# Patient Record
Sex: FEMALE | Race: WHITE | NOT HISPANIC OR LATINO | Employment: FULL TIME | URBAN - METROPOLITAN AREA
[De-identification: names, ages, dates, MRNs, and addresses within clinical notes are randomized per-mention and may not be internally consistent; named-entity substitution may affect disease eponyms.]

---

## 2018-02-02 ENCOUNTER — TRANSFERRED RECORDS (OUTPATIENT)
Dept: HEALTH INFORMATION MANAGEMENT | Facility: CLINIC | Age: 63
End: 2018-02-02

## 2018-02-14 ENCOUNTER — TRANSFERRED RECORDS (OUTPATIENT)
Dept: HEALTH INFORMATION MANAGEMENT | Facility: CLINIC | Age: 63
End: 2018-02-14

## 2018-02-21 ENCOUNTER — TRANSFERRED RECORDS (OUTPATIENT)
Dept: HEALTH INFORMATION MANAGEMENT | Facility: CLINIC | Age: 63
End: 2018-02-21

## 2018-03-02 ENCOUNTER — TRANSFERRED RECORDS (OUTPATIENT)
Dept: HEALTH INFORMATION MANAGEMENT | Facility: CLINIC | Age: 63
End: 2018-03-02

## 2018-03-19 ENCOUNTER — TRANSFERRED RECORDS (OUTPATIENT)
Dept: HEALTH INFORMATION MANAGEMENT | Facility: CLINIC | Age: 63
End: 2018-03-19

## 2018-03-19 ENCOUNTER — MEDICAL CORRESPONDENCE (OUTPATIENT)
Dept: HEALTH INFORMATION MANAGEMENT | Facility: CLINIC | Age: 63
End: 2018-03-19

## 2018-04-16 ENCOUNTER — HEALTH MAINTENANCE LETTER (OUTPATIENT)
Age: 63
End: 2018-04-16

## 2018-04-17 ENCOUNTER — OFFICE VISIT (OUTPATIENT)
Dept: GASTROENTEROLOGY | Facility: CLINIC | Age: 63
End: 2018-04-17
Attending: INTERNAL MEDICINE
Payer: COMMERCIAL

## 2018-04-17 VITALS
RESPIRATION RATE: 16 BRPM | OXYGEN SATURATION: 100 % | BODY MASS INDEX: 32.78 KG/M2 | SYSTOLIC BLOOD PRESSURE: 125 MMHG | TEMPERATURE: 98.4 F | DIASTOLIC BLOOD PRESSURE: 76 MMHG | WEIGHT: 192 LBS | HEIGHT: 64 IN | HEART RATE: 68 BPM

## 2018-04-17 DIAGNOSIS — R94.5 ABNORMAL RESULTS OF LIVER FUNCTION STUDIES: Primary | ICD-10-CM

## 2018-04-17 DIAGNOSIS — R94.5 ABNORMAL RESULTS OF LIVER FUNCTION STUDIES: ICD-10-CM

## 2018-04-17 LAB
ALBUMIN SERPL-MCNC: 3.4 G/DL (ref 3.4–5)
ALP SERPL-CCNC: 115 U/L (ref 40–150)
ALT SERPL W P-5'-P-CCNC: 45 U/L (ref 0–50)
ANION GAP SERPL CALCULATED.3IONS-SCNC: 6 MMOL/L (ref 3–14)
AST SERPL W P-5'-P-CCNC: 44 U/L (ref 0–45)
BILIRUB DIRECT SERPL-MCNC: 0.1 MG/DL (ref 0–0.2)
BILIRUB SERPL-MCNC: 0.4 MG/DL (ref 0.2–1.3)
BUN SERPL-MCNC: 11 MG/DL (ref 7–30)
CALCIUM SERPL-MCNC: 7.9 MG/DL (ref 8.5–10.1)
CHLORIDE SERPL-SCNC: 109 MMOL/L (ref 94–109)
CO2 SERPL-SCNC: 25 MMOL/L (ref 20–32)
CREAT SERPL-MCNC: 0.58 MG/DL (ref 0.52–1.04)
ERYTHROCYTE [DISTWIDTH] IN BLOOD BY AUTOMATED COUNT: 16.8 % (ref 10–15)
FERRITIN SERPL-MCNC: 41 NG/ML (ref 8–252)
GFR SERPL CREATININE-BSD FRML MDRD: >90 ML/MIN/1.7M2
GLUCOSE SERPL-MCNC: 98 MG/DL (ref 70–99)
HAV IGG SER QL IA: REACTIVE
HCT VFR BLD AUTO: 40.5 % (ref 35–47)
HCV AB SERPL QL IA: NONREACTIVE
HGB BLD-MCNC: 13.1 G/DL (ref 11.7–15.7)
IGG SERPL-MCNC: 1360 MG/DL (ref 695–1620)
IGM SERPL-MCNC: 68 MG/DL (ref 60–265)
INR PPP: 1.1 (ref 0.86–1.14)
IRON SATN MFR SERPL: 8 % (ref 15–46)
IRON SERPL-MCNC: 31 UG/DL (ref 35–180)
MCH RBC QN AUTO: 27.3 PG (ref 26.5–33)
MCHC RBC AUTO-ENTMCNC: 32.3 G/DL (ref 31.5–36.5)
MCV RBC AUTO: 84 FL (ref 78–100)
PLATELET # BLD AUTO: 153 10E9/L (ref 150–450)
POTASSIUM SERPL-SCNC: 3.4 MMOL/L (ref 3.4–5.3)
PROT SERPL-MCNC: 7.5 G/DL (ref 6.8–8.8)
RBC # BLD AUTO: 4.8 10E12/L (ref 3.8–5.2)
SODIUM SERPL-SCNC: 141 MMOL/L (ref 133–144)
TIBC SERPL-MCNC: 387 UG/DL (ref 240–430)
WBC # BLD AUTO: 5.4 10E9/L (ref 4–11)

## 2018-04-17 PROCEDURE — 83540 ASSAY OF IRON: CPT | Performed by: INTERNAL MEDICINE

## 2018-04-17 PROCEDURE — 36415 COLL VENOUS BLD VENIPUNCTURE: CPT | Performed by: INTERNAL MEDICINE

## 2018-04-17 PROCEDURE — G0463 HOSPITAL OUTPT CLINIC VISIT: HCPCS | Mod: ZF

## 2018-04-17 PROCEDURE — 86256 FLUORESCENT ANTIBODY TITER: CPT | Performed by: INTERNAL MEDICINE

## 2018-04-17 PROCEDURE — 85610 PROTHROMBIN TIME: CPT | Performed by: INTERNAL MEDICINE

## 2018-04-17 PROCEDURE — 80048 BASIC METABOLIC PNL TOTAL CA: CPT | Performed by: INTERNAL MEDICINE

## 2018-04-17 PROCEDURE — 86038 ANTINUCLEAR ANTIBODIES: CPT | Performed by: INTERNAL MEDICINE

## 2018-04-17 PROCEDURE — 86708 HEPATITIS A ANTIBODY: CPT | Performed by: INTERNAL MEDICINE

## 2018-04-17 PROCEDURE — 86039 ANTINUCLEAR ANTIBODIES (ANA): CPT | Performed by: INTERNAL MEDICINE

## 2018-04-17 PROCEDURE — 83516 IMMUNOASSAY NONANTIBODY: CPT | Mod: 91 | Performed by: INTERNAL MEDICINE

## 2018-04-17 PROCEDURE — 86803 HEPATITIS C AB TEST: CPT | Performed by: INTERNAL MEDICINE

## 2018-04-17 PROCEDURE — 82784 ASSAY IGA/IGD/IGG/IGM EACH: CPT | Performed by: INTERNAL MEDICINE

## 2018-04-17 PROCEDURE — 80076 HEPATIC FUNCTION PANEL: CPT | Performed by: INTERNAL MEDICINE

## 2018-04-17 PROCEDURE — 82728 ASSAY OF FERRITIN: CPT | Performed by: INTERNAL MEDICINE

## 2018-04-17 PROCEDURE — 83516 IMMUNOASSAY NONANTIBODY: CPT | Performed by: INTERNAL MEDICINE

## 2018-04-17 PROCEDURE — 83550 IRON BINDING TEST: CPT | Performed by: INTERNAL MEDICINE

## 2018-04-17 PROCEDURE — 85027 COMPLETE CBC AUTOMATED: CPT | Performed by: INTERNAL MEDICINE

## 2018-04-17 RX ORDER — LABETALOL 200 MG/1
200 TABLET, FILM COATED ORAL 2 TIMES DAILY
COMMUNITY
Start: 2017-07-03 | End: 2021-06-16

## 2018-04-17 RX ORDER — CALCITRIOL 0.25 UG/1
0.5 CAPSULE, LIQUID FILLED ORAL 2 TIMES DAILY
COMMUNITY
Start: 2016-08-15

## 2018-04-17 RX ORDER — PNV NO.95/FERROUS FUM/FOLIC AC 28MG-0.8MG
1 TABLET ORAL DAILY
COMMUNITY
Start: 2018-02-10

## 2018-04-17 RX ORDER — LISINOPRIL 20 MG/1
20 TABLET ORAL DAILY
COMMUNITY
Start: 2001-05-01 | End: 2024-09-11

## 2018-04-17 RX ORDER — ALBUTEROL SULFATE 90 UG/1
2 AEROSOL, METERED RESPIRATORY (INHALATION) EVERY 4 HOURS PRN
COMMUNITY

## 2018-04-17 RX ORDER — MONTELUKAST SODIUM 10 MG/1
10 TABLET ORAL DAILY
COMMUNITY
Start: 1995-05-01

## 2018-04-17 RX ORDER — ESCITALOPRAM OXALATE 20 MG/1
20 TABLET ORAL DAILY
COMMUNITY
Start: 2018-03-02 | End: 2024-03-13 | Stop reason: ALTCHOICE

## 2018-04-17 RX ORDER — SIMVASTATIN 20 MG
20 TABLET ORAL DAILY
COMMUNITY
End: 2020-10-07 | Stop reason: ALTCHOICE

## 2018-04-17 RX ORDER — LEVOTHYROXINE SODIUM 200 UG/1
250 TABLET ORAL DAILY
COMMUNITY
Start: 1996-01-13

## 2018-04-17 ASSESSMENT — PAIN SCALES - GENERAL: PAINLEVEL: NO PAIN (0)

## 2018-04-17 NOTE — PROGRESS NOTES
[facility]    Hepatology New Patient Visit    Referring provider:  Jonathon Barrios      62 year old female    Chief complaint:  Abnormal liver function tests    HPI:  The patient comes to clinic this morning with her  for further evaluation and management of abnormal liver function tests.  This was first identified in 01/2018 after blood work was obtained to evaluate fatigue.  Of note, hemoglobin was low with mild elevations in transaminase and an elevated alkaline phosphatase also noted.  The patient had recently been sick from influenza in 12/2017.  The patient has had an extensive evaluation including additional blood work, MRI liver/MRCP and liver biopsy.  Liver biopsy showed neutrophil-predominant inflammation and evidence of possible biliary obstruction in a patchy distribution.  There was some evidence of fibrosis (stage 2).   The patient also underwent upper endoscopy which was essentially normal.     The patient is feeling well today.  Her fatigue has improved with starting iron supplements.  She notes some mild itching over the last 2 months.  This is worse at night-time and occurs predominantly on her trunk.  She denies any exacerbating or relieving factors.      The patient reports some mild joint aches bilaterally in both hands.  This has been going on for the past couple of months.  She denies any joint erythema or swelling.      The patient denies rash, jaundice, abdominal distention, lower extremity edema, or confusion.      The patient denies any history of weight loss, dark urine or pale stools.      No history of melena, hematemesis or hematochezia.      The patient reports some mild sweats at night.  She denies any fevers or chills.      Weight has been stable.      The patient reports Augmentin use following her URI/influenza in 12/2017.  She denies any new regular prescription medications since that time.  She also denies any herbal medication use or supplement  use.      The patient drinks alcohol once or twice per month.  On these occasions, she will have 1-2 margaritas.  She denies any history of alcohol abuse.  She denies any health or with work related issues from alcohol use.  No history of DUIs.      The patient has never smoked.  She denies any recreational drug use including marijuana, IM or IV drugs.       Medical hx Surgical hx   Past Medical History:   Diagnosis Date     Asthma      Depression with anxiety      GERD (gastroesophageal reflux disease)      Hyperlipidemia      Hypertension      Obesity      Thyroid cancer (H) 1996      Past Surgical History:   Procedure Laterality Date     C TOTAL ABDOM HYSTERECTOMY       CHOLECYSTECTOMY       SALPINGO-OOPHORECTOMY BILATERAL       SHOULDER SURGERY       THYROIDECTOMY  1996          Medications  Prior to Admission medications    Medication Sig Start Date End Date Taking? Authorizing Provider   calcitRIOL (ROCALTROL) 0.25 MCG capsule Take 0.25 mcg by mouth daily 8/15/16  Yes Reported, Patient   calcium carbonate-vitamin D (CALCIUM 600/VITAMIN D) 600-400 MG-UNIT CHEW Take 1 chew tab by mouth 4 times daily 1/13/1996  Yes Reported, Patient   escitalopram (LEXAPRO) 20 MG tablet Take 20 mg by mouth daily 3/2/18  Yes Reported, Patient   Ferrous Sulfate (IRON) 325 (65 Fe) MG tablet Take 1 tablet by mouth daily 2/10/18  Yes Reported, Patient   labetalol (NORMODYNE) 200 MG tablet Take 200 mg by mouth daily 7/3/17  Yes Reported, Patient   levothyroxine (SYNTHROID/LEVOTHROID) 200 MCG tablet Take 200 mcg by mouth daily 1/13/1996  Yes Reported, Patient   lisinopril (PRINIVIL/ZESTRIL) 20 MG tablet Take 20 mg by mouth daily 5/1/01  Yes Reported, Patient   montelukast (SINGULAIR) 10 MG tablet Take 10 mg by mouth daily 5/1/1995  Yes Reported, Patient   simvastatin (ZOCOR) 20 MG tablet Take 20 mg by mouth daily   Yes Reported, Patient   albuterol (PROAIR HFA/PROVENTIL HFA/VENTOLIN HFA) 108 (90 Base) MCG/ACT Inhaler Inhale 2 puffs into  "the lungs every 6 hours as needed for shortness of breath / dyspnea or wheezing   Yes Reported, Patient       Allergies  No Known Allergies    Family hx Social hx   Family History   Problem Relation Age of Onset     Breast Cancer Mother      Coronary Artery Disease Father      Colon Cancer Brother 50     Liver Disease No family hx of      Rheumatoid Arthritis No family hx of       Social History   Substance Use Topics     Smoking status: Never Smoker     Smokeless tobacco: Never Used     Alcohol use Yes      Comment: occasionally     Lives in [place], [place] with  on farm.  Works as  for [employer].  2 healthy adult children.     Review of systems  A 10-point review of systems was negative.    Examination  /76 (BP Location: Right arm, Patient Position: Sitting, Cuff Size: Adult Large)  Pulse 68  Temp 98.4  F (36.9  C) (Oral)  Resp 16  Ht 1.626 m (5' 4\")  Wt 87.1 kg (192 lb)  SpO2 100%  BMI 32.96 kg/m2  Body mass index is 32.96 kg/(m^2).    Gen- well, NAD, A+Ox3, normal color  Eye- EOMI  ENT- MMM, normal oropharynx, thyroidectomy scar  Lym- no palpable lymphadenopathy  CVS- S1, S2 normal, no added sounds, RRR  RS- CTA  Abd- obese, striae, scars consistent with prior surgeries, soft, non-tender, no   Extr- pulses good, no ALEX  MS- hands normal- no clubbing, palmar erythema or dupuytren's contracture, hand joints normal- no erythema, swelling or deformities  Neuro- A+Ox3, no asterixis  Skin- no rash or jaundice  Psych- normal mood    Laboratory  2/19/18  TB 0.7, ALT 80, AST 65, AlkPh 296    HBV SAg, HBV SAb, HBV CAb all neg    REECE 1.9  AMA<1.2  TTG Ab neg  Ferritin 26    2/9/18  Na 142, K 4.0, Cl 105, HCO3 26, BUN 13, Cr 0.65    Liver bx March 2018 - patchy portal inflammatory infiltrate predominantly neutrophils, ductular proliferation, minimal macrovesicular fatty change, enlarged portal tracts, portal fibrosis    Radiology  MRI liver 3/19/18- no ductal dilation, no mass, simple hepatic " cyst    Assessment  62-year-old female who presents for further evaluation and management of abnormal liver function tests and nonspecific abnormalities on liver biopsy.  History of itch and elevated serum alkaline phosphatase raised the suspicion for primary biliary cholangitis.  Drug-induced liver-injury is also possible with history of Augmentin use in December.  No evidence of cirrhosis on liver biopsy.  Will repeat serologies and liver function tests today to evaluate for PBC as this would require long-term management.  Will monitor for now with further interventions/management to be decided pending development of new symptoms/worsening abnormal liver function tests.     Plan  1.  Check CMP, INR, CBC  2.  Check REECE, AMA, IgG, IgM, iron studies  3.  Follow-up in 3 months    Tang Maddox MD  Hepatology  [facility]

## 2018-04-17 NOTE — LETTER
4/17/2018      RE: Geri Zamorano  [address]  [facility] [address]       [facility]    Hepatology New Patient Visit    Referring provider:  Jonathon Barrios      62 year old female    Chief complaint:  Abnormal liver function tests    HPI:  The patient comes to clinic this morning with her  for further evaluation and management of abnormal liver function tests.  This was first identified in 01/2018 after blood work was obtained to evaluate fatigue.  Of note, hemoglobin was low with mild elevations in transaminase and an elevated alkaline phosphatase also noted.  The patient had recently been sick from influenza in 12/2017.  The patient has had an extensive evaluation including additional blood work, MRI liver/MRCP and liver biopsy.  Liver biopsy showed neutrophil-predominant inflammation and evidence of possible biliary obstruction in a patchy distribution.  There was some evidence of fibrosis (stage 2).   The patient also underwent upper endoscopy which was essentially normal.     The patient is feeling well today.  Her fatigue has improved with starting iron supplements.  She notes some mild itching over the last 2 months.  This is worse at night-time and occurs predominantly on her trunk.  She denies any exacerbating or relieving factors.      The patient reports some mild joint aches bilaterally in both hands.  This has been going on for the past couple of months.  She denies any joint erythema or swelling.      The patient denies rash, jaundice, abdominal distention, lower extremity edema, or confusion.      The patient denies any history of weight loss, dark urine or pale stools.      No history of melena, hematemesis or hematochezia.      The patient reports some mild sweats at night.  She denies any fevers or chills.      Weight has been stable.      The patient reports Augmentin use following her URI/influenza in 12/2017.  She denies any new regular prescription  medications since that time.  She also denies any herbal medication use or supplement use.      The patient drinks alcohol once or twice per month.  On these occasions, she will have 1-2 margaritas.  She denies any history of alcohol abuse.  She denies any health or with work related issues from alcohol use.  No history of DUIs.      The patient has never smoked.  She denies any recreational drug use including marijuana, IM or IV drugs.       Medical hx Surgical hx   Past Medical History:   Diagnosis Date     Asthma      Depression with anxiety      GERD (gastroesophageal reflux disease)      Hyperlipidemia      Hypertension      Obesity      Thyroid cancer (H) 1996      Past Surgical History:   Procedure Laterality Date     C TOTAL ABDOM HYSTERECTOMY       CHOLECYSTECTOMY       SALPINGO-OOPHORECTOMY BILATERAL       SHOULDER SURGERY       THYROIDECTOMY  1996          Medications  Prior to Admission medications    Medication Sig Start Date End Date Taking? Authorizing Provider   calcitRIOL (ROCALTROL) 0.25 MCG capsule Take 0.25 mcg by mouth daily 8/15/16  Yes Reported, Patient   calcium carbonate-vitamin D (CALCIUM 600/VITAMIN D) 600-400 MG-UNIT CHEW Take 1 chew tab by mouth 4 times daily 1/13/1996  Yes Reported, Patient   escitalopram (LEXAPRO) 20 MG tablet Take 20 mg by mouth daily 3/2/18  Yes Reported, Patient   Ferrous Sulfate (IRON) 325 (65 Fe) MG tablet Take 1 tablet by mouth daily 2/10/18  Yes Reported, Patient   labetalol (NORMODYNE) 200 MG tablet Take 200 mg by mouth daily 7/3/17  Yes Reported, Patient   levothyroxine (SYNTHROID/LEVOTHROID) 200 MCG tablet Take 200 mcg by mouth daily 1/13/1996  Yes Reported, Patient   lisinopril (PRINIVIL/ZESTRIL) 20 MG tablet Take 20 mg by mouth daily 5/1/01  Yes Reported, Patient   montelukast (SINGULAIR) 10 MG tablet Take 10 mg by mouth daily 5/1/1995  Yes Reported, Patient   simvastatin (ZOCOR) 20 MG tablet Take 20 mg by mouth daily   Yes Reported, Patient   albuterol  "(PROAIR HFA/PROVENTIL HFA/VENTOLIN HFA) 108 (90 Base) MCG/ACT Inhaler Inhale 2 puffs into the lungs every 6 hours as needed for shortness of breath / dyspnea or wheezing   Yes Reported, Patient       Allergies  No Known Allergies    Family hx Social hx   Family History   Problem Relation Age of Onset     Breast Cancer Mother      Coronary Artery Disease Father      Colon Cancer Brother 50     Liver Disease No family hx of      Rheumatoid Arthritis No family hx of       Social History   Substance Use Topics     Smoking status: Never Smoker     Smokeless tobacco: Never Used     Alcohol use Yes      Comment: occasionally     Lives in [place], [place] with  on farm.  Works as  for [employer].  2 healthy adult children.     Review of systems  A 10-point review of systems was negative.    Examination  /76 (BP Location: Right arm, Patient Position: Sitting, Cuff Size: Adult Large)  Pulse 68  Temp 98.4  F (36.9  C) (Oral)  Resp 16  Ht 1.626 m (5' 4\")  Wt 87.1 kg (192 lb)  SpO2 100%  BMI 32.96 kg/m2  Body mass index is 32.96 kg/(m^2).    Gen- well, NAD, A+Ox3, normal color  Eye- EOMI  ENT- MMM, normal oropharynx, thyroidectomy scar  Lym- no palpable lymphadenopathy  CVS- S1, S2 normal, no added sounds, RRR  RS- CTA  Abd- obese, striae, scars consistent with prior surgeries, soft, non-tender, no   Extr- pulses good, no ALEX  MS- hands normal- no clubbing, palmar erythema or dupuytren's contracture, hand joints normal- no erythema, swelling or deformities  Neuro- A+Ox3, no asterixis  Skin- no rash or jaundice  Psych- normal mood    Laboratory  2/19/18  TB 0.7, ALT 80, AST 65, AlkPh 296    HBV SAg, HBV SAb, HBV CAb all neg    REECE 1.9  AMA<1.2  TTG Ab neg  Ferritin 26    2/9/18  Na 142, K 4.0, Cl 105, HCO3 26, BUN 13, Cr 0.65    Liver bx March 2018 - patchy portal inflammatory infiltrate predominantly neutrophils, ductular proliferation, minimal macrovesicular fatty change, enlarged portal tracts, portal " fibrosis    Radiology  MRI liver 3/19/18- no ductal dilation, no mass, simple hepatic cyst    Assessment  62-year-old female who presents for further evaluation and management of abnormal liver function tests and nonspecific abnormalities on liver biopsy.  History of itch and elevated serum alkaline phosphatase raised the suspicion for primary biliary cholangitis.  Drug-induced liver-injury is also possible with history of Augmentin use in December.  No evidence of cirrhosis on liver biopsy.  Will repeat serologies and liver function tests today to evaluate for PBC as this would require long-term management.  Will monitor for now with further interventions/management to be decided pending development of new symptoms/worsening abnormal liver function tests.     Plan  1.  Check CMP, INR, CBC  2.  Check REECE, AMA, IgG, IgM, iron studies  3.  Follow-up in 3 months    Tang Maddox MD  Hepatology  [facility]

## 2018-04-17 NOTE — MR AVS SNAPSHOT
After Visit Summary   4/17/2018    Geri Zamorano    MRN: [id number]           Patient Information     Date Of Birth          1955        Visit Information        Provider Department      4/17/2018 8:00 AM Tang Holloway MD [facility] Hepatology        Today's Diagnoses     Abnormal results of liver function studies    -  1       Follow-ups after your visit        Follow-up notes from your care team     Return in about 3 months (around 7/17/2018).      Your next 10 appointments already scheduled     Apr 17, 2018  9:30 AM CDT   Lab with  LAB   [facility] ([facility])    [address]  [address] [facility] [address]   [phone]            Jul 31, 2018  8:00 AM CDT   Lab with  LAB   [facility] ([facility])    [address] [place] [address]   [phone]            Jul 31, 2018  9:00 AM CDT   (Arrive by 8:45 AM)   Return General Liver with Tang Peterson MD   [facility] Hepatology ([facility])    [address] [place] [address]  [address]  [facility] [address]   [phone]              Future tests that were ordered for you today     Open Future Orders        Priority Expected Expires Ordered    INR Routine  5/17/2018 4/17/2018    IgM Routine  5/17/2018 4/17/2018    IgG Routine  5/17/2018 4/17/2018    Mitochondrial M2 Antibody IgG Routine  5/17/2018 4/17/2018    IRON Routine  5/17/2018 4/17/2018    IRON AND IRON BINDING CAPACITY Routine  5/17/2018 4/17/2018    FERRITIN Routine  5/17/2018 4/17/2018    CBC with platelets Routine  5/17/2018 4/17/2018    Basic metabolic panel Routine  5/17/2018 4/17/2018    Hepatic panel Routine  5/17/2018 4/17/2018    Hepatitis C antibody Routine  5/17/2018 4/17/2018    Hepatitis Antibody A IgG Routine  5/17/2018 4/17/2018            Who to contact     If you have questions or need follow up information about today's clinic visit or  "your schedule please contact [facility] HEPATOLOG[facility] directly at [phone].  Normal or non-critical lab and imaging results will be communicated to you by MyChart, letter or phone within 4 business days after the clinic has received the results. If you do not hear from us within 7 days, please contact the clinic through [employer] or phone. If you have a critical or abnormal lab result, we will notify you by phone as soon as possible.  Submit refill requests through [employer] or call your pharmacy and they will forward the refill request to us. Please allow 3 business days for your refill to be completed.          Additional Information About Your Visit        [employer] Information     [employer] gives you secure access to your electronic health record. If you see a primary care provider, you can also send messages to your care team and make appointments. If you have questions, please call your primary care clinic.  If you do not have a primary care provider, please call [phone] and they will assist you.        Care EveryWhere ID     This is your Care EveryWhere ID. This could be used by other organizations to access your [place] medical records  [id number]        Your Vitals Were     Pulse Temperature Respirations Height Pulse Oximetry BMI (Body Mass Index)    68 98.4  F (36.9  C) (Oral) 16 1.626 m (5' 4\") 100% 32.96 kg/m2       Blood Pressure from Last 3 Encounters:   04/17/18 125/76    Weight from Last 3 Encounters:   04/17/18 87.1 kg (192 lb)              We Performed the Following     Anti Nuclear Natasha IgG by IFA with Reflex     F Actin EIA with reflex        Primary Care Provider Office Phone # Fax #    Maira MURPHY Leon [phone]       [address]  [facility] [address]        Equal Access to Services     VIRI MITCHELL : Hadii ronnell Felipe, waaxda luqadaha, qaybta kaalmada melissa, sean hill. So [facility] [phone].    ATENCIÓN: Si " humble russell, tiene a huang disposición servicios gratuitos de asistencia lingüística. Jesusita garcia [phone].    We comply with applicable federal civil rights laws and [place] laws. We do not discriminate on the basis of race, color, national origin, age, disability, sex, sexual orientation, or gender identity.            Thank you!     Thank you for choosing [facility] HEPATOLOG[facility]  for your care. Our goal is always to provide you with excellent care. Hearing back from our patients is one way we can continue to improve our services. Please take a few minutes to complete the written survey that you may receive in the mail after your visit with us. Thank you!             Your Updated Medication List - Protect others around you: Learn how to safely use, store and throw away your medicines at www.disposemymeds.org.          This list is accurate as of 4/17/18  8:57 AM.  Always use your most recent med list.                   Brand Name Dispense Instructions for use Diagnosis    albuterol 108 (90 Base) MCG/ACT Inhaler    PROAIR HFA/PROVENTIL HFA/VENTOLIN HFA     Inhale 2 puffs into the lungs every 6 hours as needed for shortness of breath / dyspnea or wheezing    Abnormal results of liver function studies       calcitRIOL 0.25 MCG capsule    ROCALTROL     Take 0.25 mcg by mouth daily    Abnormal results of liver function studies       CALCIUM 600/VITAMIN D 600-400 MG-UNIT Chew   Generic drug:  calcium carbonate-vitamin D      Take 1 chew tab by mouth 4 times daily    Abnormal results of liver function studies       escitalopram 20 MG tablet    LEXAPRO     Take 20 mg by mouth daily    Abnormal results of liver function studies       iron 325 (65 Fe) MG tablet      Take 1 tablet by mouth daily    Abnormal results of liver function studies       labetalol 200 MG tablet    NORMODYNE     Take 200 mg by mouth daily    Abnormal results of liver function studies       levothyroxine 200 MCG tablet    SYNTHROID/LEVOTHROID     Take  200 mcg by mouth daily    Abnormal results of liver function studies       lisinopril 20 MG tablet    PRINIVIL/ZESTRIL     Take 20 mg by mouth daily    Abnormal results of liver function studies       montelukast 10 MG tablet    SINGULAIR     Take 10 mg by mouth daily    Abnormal results of liver function studies       simvastatin 20 MG tablet    ZOCOR     Take 20 mg by mouth daily    Abnormal results of liver function studies

## 2018-04-17 NOTE — NURSING NOTE
"Chief Complaint   Patient presents with     Consult     Elevated LFT's       Initial /76 (BP Location: Right arm, Patient Position: Sitting, Cuff Size: Adult Large)  Pulse 68  Temp 98.4  F (36.9  C) (Oral)  Resp 16  Ht 1.626 m (5' 4\")  Wt 87.1 kg (192 lb)  SpO2 100%  BMI 32.96 kg/m2 Estimated body mass index is 32.96 kg/(m^2) as calculated from the following:    Height as of this encounter: 1.626 m (5' 4\").    Weight as of this encounter: 87.1 kg (192 lb).  Medication Reconciliation: complete     Fadumo Lechuga [facility]  4/17/2018 7:54 AM        "

## 2018-04-19 LAB
ANA PAT SER IF-IMP: ABNORMAL
ANA PAT SER IF-IMP: ABNORMAL
ANA SER QL IF: POSITIVE
ANA TITR SER IF: ABNORMAL {TITER}
ANA TITR SER IF: ABNORMAL {TITER}
MITOCHONDRIA M2 IGG SER-ACNC: 1 U/ML
SMA IGG SER-ACNC: 42 UNITS (ref 0–19)
SMOOTH MUSCLE ABY IGG TITER: NORMAL

## 2018-06-26 DIAGNOSIS — R94.5 ABNORMAL RESULTS OF LIVER FUNCTION STUDIES: Primary | ICD-10-CM

## 2018-07-31 ENCOUNTER — OFFICE VISIT (OUTPATIENT)
Dept: GASTROENTEROLOGY | Facility: CLINIC | Age: 63
End: 2018-07-31
Attending: INTERNAL MEDICINE
Payer: COMMERCIAL

## 2018-07-31 VITALS
HEART RATE: 67 BPM | OXYGEN SATURATION: 98 % | DIASTOLIC BLOOD PRESSURE: 78 MMHG | WEIGHT: 200.3 LBS | BODY MASS INDEX: 34.38 KG/M2 | TEMPERATURE: 98.6 F | SYSTOLIC BLOOD PRESSURE: 153 MMHG

## 2018-07-31 DIAGNOSIS — E61.1 IRON DEFICIENCY: ICD-10-CM

## 2018-07-31 DIAGNOSIS — R94.5 ABNORMAL RESULTS OF LIVER FUNCTION STUDIES: Primary | ICD-10-CM

## 2018-07-31 DIAGNOSIS — R94.5 ABNORMAL RESULTS OF LIVER FUNCTION STUDIES: ICD-10-CM

## 2018-07-31 LAB
ALBUMIN SERPL-MCNC: 3.2 G/DL (ref 3.4–5)
ALP SERPL-CCNC: 86 U/L (ref 40–150)
ALT SERPL W P-5'-P-CCNC: 36 U/L (ref 0–50)
ANION GAP SERPL CALCULATED.3IONS-SCNC: 9 MMOL/L (ref 3–14)
AST SERPL W P-5'-P-CCNC: 39 U/L (ref 0–45)
BILIRUB DIRECT SERPL-MCNC: 0.2 MG/DL (ref 0–0.2)
BILIRUB SERPL-MCNC: 0.6 MG/DL (ref 0.2–1.3)
BUN SERPL-MCNC: 12 MG/DL (ref 7–30)
CALCIUM SERPL-MCNC: 7.9 MG/DL (ref 8.5–10.1)
CHLORIDE SERPL-SCNC: 110 MMOL/L (ref 94–109)
CO2 SERPL-SCNC: 21 MMOL/L (ref 20–32)
CREAT SERPL-MCNC: 0.64 MG/DL (ref 0.52–1.04)
ERYTHROCYTE [DISTWIDTH] IN BLOOD BY AUTOMATED COUNT: 14.1 % (ref 10–15)
GFR SERPL CREATININE-BSD FRML MDRD: >90 ML/MIN/1.7M2
GLUCOSE SERPL-MCNC: 153 MG/DL (ref 70–99)
HCT VFR BLD AUTO: 42.2 % (ref 35–47)
HGB BLD-MCNC: 13.6 G/DL (ref 11.7–15.7)
INR PPP: 1.16 (ref 0.86–1.14)
MCH RBC QN AUTO: 28.6 PG (ref 26.5–33)
MCHC RBC AUTO-ENTMCNC: 32.2 G/DL (ref 31.5–36.5)
MCV RBC AUTO: 89 FL (ref 78–100)
PLATELET # BLD AUTO: 128 10E9/L (ref 150–450)
POTASSIUM SERPL-SCNC: 3.6 MMOL/L (ref 3.4–5.3)
PROT SERPL-MCNC: 7.3 G/DL (ref 6.8–8.8)
RBC # BLD AUTO: 4.76 10E12/L (ref 3.8–5.2)
SODIUM SERPL-SCNC: 141 MMOL/L (ref 133–144)
WBC # BLD AUTO: 4.6 10E9/L (ref 4–11)

## 2018-07-31 PROCEDURE — 80076 HEPATIC FUNCTION PANEL: CPT | Performed by: INTERNAL MEDICINE

## 2018-07-31 PROCEDURE — 36415 COLL VENOUS BLD VENIPUNCTURE: CPT | Performed by: INTERNAL MEDICINE

## 2018-07-31 PROCEDURE — 85610 PROTHROMBIN TIME: CPT | Performed by: INTERNAL MEDICINE

## 2018-07-31 PROCEDURE — G0463 HOSPITAL OUTPT CLINIC VISIT: HCPCS | Mod: ZF

## 2018-07-31 PROCEDURE — 80048 BASIC METABOLIC PNL TOTAL CA: CPT | Performed by: INTERNAL MEDICINE

## 2018-07-31 PROCEDURE — 85027 COMPLETE CBC AUTOMATED: CPT | Performed by: INTERNAL MEDICINE

## 2018-07-31 RX ORDER — TOPIRAMATE 25 MG/1
2 TABLET, FILM COATED ORAL AT BEDTIME
Refills: 0 | COMMUNITY
Start: 2018-06-12 | End: 2021-06-16

## 2018-07-31 ASSESSMENT — PAIN SCALES - GENERAL: PAINLEVEL: NO PAIN (0)

## 2018-07-31 NOTE — MR AVS SNAPSHOT
After Visit Summary   7/31/2018    Geri Zamorano    MRN: [id number]           Patient Information     Date Of Birth          1955        Visit Information        Provider Department      7/31/2018 9:00 AM Tang Holloway MD [facility] Hepatology        Today's Diagnoses     Abnormal results of liver function studies    -  1    Iron deficiency          Care Instructions    Plan  1.  Liver ultrasound- can be done at [facility]  2.  Repeat blood tests in 3 months- can be done at [facility]  3.  Increase iron tablets to three times per day for the next 3 months  4.  Follow-up in the office in 6 months    Tang Maddox MD  Hepatology  [facility]          Follow-ups after your visit        Follow-up notes from your care team     Return in about 6 months (around 1/31/2019).      Your next 10 appointments already scheduled     Oct 23, 2018  7:40 AM CDT   US ABDOMEN COMPLETE with SHUS5   [facility] Ultrasound ([facility])    [address] [place] [address] [phone]           Please bring a list of your medicines (including vitamins, minerals and over-the-counter drugs). Also, tell your doctor about any allergies you may have. Wear comfortable clothes and leave your valuables at home.  Adults: No eating or drinking for 8 hours before the exam. You may take medicine with a small sip of water.  Children: - Infants, breast-fed: may have breast milk up to 2 hours before exam. - Infants, formula: may have bottle until 4 hours before exam. - Children 1-5 years: No food or drink for 4 hours before exam. - Children 6 -12 years: No food or drink for 6 hours before exam. - Children over 12 years: No food or drink for 8 hours before exam. - J Tube Fed: No need to stop feedings.  Please call the Imaging Department at your exam site with any questions.            Oct 23, 2018  8:30 AM CDT   LAB with  LAB ONLY   [facility] ([facility]  [facility])    [address] [place] [address] [phone]           Please do not eat 10-12 hours before your appointment if you are coming in fasting for labs on lipids, cholesterol, or glucose (sugar). This does not apply to pregnant women. Water, hot tea and black coffee (with nothing added) are okay. Do not drink other fluids, diet soda or chew gum.            Jan 29, 2019  9:00 AM CST   Lab with  LAB   [facility] ([facility])    [address]  1st [address]  [facility] [address] [phone]            Jan 29, 2019 10:00 AM CST   (Arrive by 9:45 AM)   Return General Liver with Tang Peterson MD   [facility] Hepatology ([facility])    [address]  [address]  [facility] [address] [phone]              Future tests that were ordered for you today     Open Future Orders        Priority Expected Expires Ordered    IRON Routine 10/31/2018 11/30/2018 7/31/2018    IRON AND IRON BINDING CAPACITY Routine 10/31/2018 11/30/2018 7/31/2018    FERRITIN Routine 10/31/2018 11/30/2018 7/31/2018    CBC with platelets Routine 10/31/2018 11/30/2018 7/31/2018    Basic metabolic panel Routine 10/31/2018 11/30/2018 7/31/2018    Hepatic panel Routine 10/31/2018 11/30/2018 7/31/2018    Anti Nuclear Natasha IgG by IFA with Reflex Routine 10/31/2018 11/30/2018 7/31/2018    INR Routine 10/31/2018 11/30/2018 7/31/2018    Mitochondrial M2 Antibody IgG Routine 10/31/2018 11/30/2018 7/31/2018    F Actin EIA with reflex Routine 10/31/2018 11/30/2018 7/31/2018    US Abdomen Complete Routine  7/31/2019 7/31/2018            Who to contact     If you have questions or need follow up information about today's clinic visit or your schedule please contact [facility] HEPATOLOGY directly at [phone].  Normal or non-critical lab and imaging results will be communicated to you by MyChart, letter or phone within 4 business days after the clinic has received the  results. If you do not hear from us within 7 days, please contact the clinic through [employer] or phone. If you have a critical or abnormal lab result, we will notify you by phone as soon as possible.  Submit refill requests through [employer] or call your pharmacy and they will forward the refill request to us. Please allow 3 business days for your refill to be completed.          Additional Information About Your Visit        [employer] Information     [employer] gives you secure access to your electronic health record. If you see a primary care provider, you can also send messages to your care team and make appointments. If you have questions, please call your primary care clinic.  If you do not have a primary care provider, please call [phone] and they will assist you.        Care EveryWhere ID     This is your Care EveryWhere ID. This could be used by other organizations to access your [place] medical records  [id number]        Your Vitals Were     Pulse Temperature Pulse Oximetry BMI (Body Mass Index)          67 98.6  F (37  C) (Oral) 98% 34.38 kg/m2         Blood Pressure from Last 3 Encounters:   07/31/18 153/78   04/17/18 125/76    Weight from Last 3 Encounters:   07/31/18 90.9 kg (200 lb 4.8 oz)   04/17/18 87.1 kg (192 lb)               Primary Care Provider Office Phone # Fax #    Maira Leon [phone]       [address]  [facility] [address]        Equal Access to Services     RUBY MITCHELL AH: Hadii ronnell ku hadasho Soomaali, waaxda luqadaha, qaybta kaalmada adeegyada, sean henry adeemil hill. So [facility] [phone].    ATENCIÓN: Si habla español, tiene a huang disposición servicios gratuitos de asistencia lingüística. Llame al [phone].    We comply with applicable federal civil rights laws and [place] laws. We do not discriminate on the basis of race, color, national origin, age, disability, sex, sexual orientation, or gender identity.             Thank you!     Thank you for choosing [facility] HEPATOLOGY  for your care. Our goal is always to provide you with excellent care. Hearing back from our patients is one way we can continue to improve our services. Please take a few minutes to complete the written survey that you may receive in the mail after your visit with us. Thank you!             Your Updated Medication List - Protect others around you: Learn how to safely use, store and throw away your medicines at www.disposemymeds.org.          This list is accurate as of 7/31/18  9:27 AM.  Always use your most recent med list.                   Brand Name Dispense Instructions for use Diagnosis    albuterol 108 (90 Base) MCG/ACT Inhaler    PROAIR HFA/PROVENTIL HFA/VENTOLIN HFA     Inhale 2 puffs into the lungs every 6 hours as needed for shortness of breath / dyspnea or wheezing    Abnormal results of liver function studies       calcitRIOL 0.25 MCG capsule    ROCALTROL     Take 0.25 mcg by mouth daily    Abnormal results of liver function studies       CALCIUM 600/VITAMIN D 600-400 MG-UNIT Chew   Generic drug:  calcium carbonate-vitamin D      Take 1 chew tab by mouth 4 times daily    Abnormal results of liver function studies       escitalopram 20 MG tablet    LEXAPRO     Take 20 mg by mouth daily    Abnormal results of liver function studies       iron 325 (65 Fe) MG tablet      Take 1 tablet by mouth daily    Abnormal results of liver function studies       labetalol 200 MG tablet    NORMODYNE     Take 200 mg by mouth daily    Abnormal results of liver function studies       levothyroxine 200 MCG tablet    SYNTHROID/LEVOTHROID     Take 200 mcg by mouth daily    Abnormal results of liver function studies       lisinopril 20 MG tablet    PRINIVIL/ZESTRIL     Take 20 mg by mouth daily    Abnormal results of liver function studies       montelukast 10 MG tablet    SINGULAIR     Take 10 mg by mouth daily    Abnormal results of liver function studies        simvastatin 20 MG tablet    ZOCOR     Take 20 mg by mouth daily    Abnormal results of liver function studies       topiramate 25 MG tablet    TOPAMAX     Take 2 tablets by mouth At Bedtime

## 2018-07-31 NOTE — NURSING NOTE
Chief Complaint   Patient presents with     RECHECK     Follow up and abnormal LFT's     /78 (BP Location: Right arm)  Pulse 67  Temp 98.6  F (37  C) (Oral)  Wt 90.9 kg (200 lb 4.8 oz)  SpO2 98%  BMI 34.38 kg/m2   Catia Mansfield

## 2018-07-31 NOTE — PATIENT INSTRUCTIONS
Plan  1.  Liver ultrasound- can be done at [facility]  2.  Repeat blood tests in 3 months- can be done at [facility]  3.  Increase iron tablets to three times per day for the next 3 months  4.  Follow-up in the office in 6 months    Tang Maddox MD  Hepatology  [facility]

## 2018-07-31 NOTE — PROGRESS NOTES
"[facility]    Hepatology follow-up    Follow-up visit for abnormal liver function tests    Subjective:  63 year old female    Patient comes to clinic this morning for follow-up of abnormal liver function tests.  Last clinic visit April 2018.  Repeat blood work at the time showed normal LFTs, negative AMA, positive REECE, positive F-actin Ab and iron deficiency.  Patient denies new medications, ER visits or hospital admissions since last clinic visit.    Patient is doing OK today.  Her main complaint is intermittent, upper abdomen/RUQ pain.  This is occasionally related to eating but not any foods in particular.  She also reports intermittent \"swollen glands\" which are normal today.  Patient denies any symptoms specific to liver disease.    Patient reports some abdominal bloating/distension.  She has gained 8lbs weight since her last clinic visit.    She also reports occasional itching similar to last visit- worse at night, affecting stomach, back and face.    Patient denies rash, jaundice, lower extremity edema, lethargy or confusion.    Patient denies melena, hematemesis or hematochezia.    Patient denies fevers, sweats or chills.    Patient rarely drinks alcohol.      Medical hx Surgical hx   Past Medical History:   Diagnosis Date     Asthma      Depression with anxiety      GERD (gastroesophageal reflux disease)      Hyperlipidemia      Hypertension      Obesity      Thyroid cancer (H) 1996      Past Surgical History:   Procedure Laterality Date     C TOTAL ABDOM HYSTERECTOMY       CHOLECYSTECTOMY       SALPINGO-OOPHORECTOMY BILATERAL       SHOULDER SURGERY       THYROIDECTOMY  1996          Medications  Prior to Admission medications    Medication Sig Start Date End Date Taking? Authorizing Provider   albuterol (PROAIR HFA/PROVENTIL HFA/VENTOLIN HFA) 108 (90 Base) MCG/ACT Inhaler Inhale 2 puffs into the lungs every 6 hours as needed for shortness of breath / dyspnea or wheezing   Yes " Reported, Patient   calcitRIOL (ROCALTROL) 0.25 MCG capsule Take 0.25 mcg by mouth daily 8/15/16  Yes Reported, Patient   calcium carbonate-vitamin D (CALCIUM 600/VITAMIN D) 600-400 MG-UNIT CHEW Take 1 chew tab by mouth 4 times daily 1/13/1996  Yes Reported, Patient   escitalopram (LEXAPRO) 20 MG tablet Take 20 mg by mouth daily 3/2/18  Yes Reported, Patient   Ferrous Sulfate (IRON) 325 (65 Fe) MG tablet Take 1 tablet by mouth daily 2/10/18  Yes Reported, Patient   labetalol (NORMODYNE) 200 MG tablet Take 200 mg by mouth daily 7/3/17  Yes Reported, Patient   levothyroxine (SYNTHROID/LEVOTHROID) 200 MCG tablet Take 200 mcg by mouth daily 1/13/1996  Yes Reported, Patient   lisinopril (PRINIVIL/ZESTRIL) 20 MG tablet Take 20 mg by mouth daily 5/1/01  Yes Reported, Patient   montelukast (SINGULAIR) 10 MG tablet Take 10 mg by mouth daily 5/1/1995  Yes Reported, Patient   simvastatin (ZOCOR) 20 MG tablet Take 20 mg by mouth daily   Yes Reported, Patient       Allergies  No Known Allergies    Review of systems  A 10-point review of systems was negative    Examination  Wt 90.9 kg (200 lb 4.8 oz)  BMI 34.38 kg/m2  Body mass index is 34.38 kg/(m^2).    Gen- well, NAD, A+Ox3, normal color  Lym- no palpable LAD (cervical, occipital, submandibular, submental, pre-/post-auricular, parotid)  CVS- RRR  RS- CTA  Abd- obese, striae, soft, non-tender, Carnet's sign positive, no obvious ascites or organomegaly on palpation or percussion, BS+  Extr- hands normal, no ALEX  Skin- no rash or jaundice  Neuro- no asterixis  Psych- normal mood    Laboratory  Lab Results   Component Value Date     07/31/2018    POTASSIUM 3.6 07/31/2018    CHLORIDE 110 07/31/2018    CO2 21 07/31/2018    BUN 12 07/31/2018    CR 0.64 07/31/2018       Lab Results   Component Value Date    BILITOTAL 0.6 07/31/2018    ALT 36 07/31/2018    AST 39 07/31/2018    ALKPHOS 86 07/31/2018       Lab Results   Component Value Date    ALBUMIN 3.2 07/31/2018    PROTTOTAL  7.3 07/31/2018        Lab Results   Component Value Date    WBC 4.6 07/31/2018    HGB 13.6 07/31/2018    MCV 89 07/31/2018     07/31/2018       Lab Results   Component Value Date    INR 1.16 07/31/2018       Radiology  Nil recent    Assessment  63 year old female who presents for follow-up of abnormal liver function tests.  Liver function tests remain normal since last clinic visit.  Will continue to monitor patient due to positive autoimmune markers, new onset borderline thrombocytopenia and borderline hypoalbuminemia.  Will obtain abdominal ultrasound to evaluate for organomegaly and ascites given the patient's body habitus.    Plan  1.  Abd U/S  2.  Recheck CMP, INR, CBC, F-actin Ab, REECE, AMA in 3 months  3.  Follow-up in 6 months    Tang Maddox MD  Hepatology  [facility]

## 2018-07-31 NOTE — LETTER
"7/31/2018      RE: Geri Zamorano  [address] [facility] [address]       [facility]    Hepatology follow-up    Follow-up visit for abnormal liver function tests    Subjective:  63 year old female    Patient comes to clinic this morning for follow-up of abnormal liver function tests.  Last clinic visit April 2018.  Repeat blood work at the time showed normal LFTs, negative AMA, positive REECE, positive F-actin Ab and iron deficiency.  Patient denies new medications, ER visits or hospital admissions since last clinic visit.    Patient is doing OK today.  Her main complaint is intermittent, upper abdomen/RUQ pain.  This is occasionally related to eating but not any foods in particular.  She also reports intermittent \"swollen glands\" which are normal today.  Patient denies any symptoms specific to liver disease.    Patient reports some abdominal bloating/distension.  She has gained 8lbs weight since her last clinic visit.    She also reports occasional itching similar to last visit- worse at night, affecting stomach, back and face.    Patient denies rash, jaundice, lower extremity edema, lethargy or confusion.    Patient denies melena, hematemesis or hematochezia.    Patient denies fevers, sweats or chills.    Patient rarely drinks alcohol.      Medical hx Surgical hx   Past Medical History:   Diagnosis Date     Asthma      Depression with anxiety      GERD (gastroesophageal reflux disease)      Hyperlipidemia      Hypertension      Obesity      Thyroid cancer (H) 1996      Past Surgical History:   Procedure Laterality Date     C TOTAL ABDOM HYSTERECTOMY       CHOLECYSTECTOMY       SALPINGO-OOPHORECTOMY BILATERAL       SHOULDER SURGERY       THYROIDECTOMY  1996          Medications  Prior to Admission medications    Medication Sig Start Date End Date Taking? Authorizing Provider   albuterol (PROAIR HFA/PROVENTIL HFA/VENTOLIN HFA) 108 (90 Base) MCG/ACT Inhaler Inhale 2 puffs into the lungs " every 6 hours as needed for shortness of breath / dyspnea or wheezing   Yes Reported, Patient   calcitRIOL (ROCALTROL) 0.25 MCG capsule Take 0.25 mcg by mouth daily 8/15/16  Yes Reported, Patient   calcium carbonate-vitamin D (CALCIUM 600/VITAMIN D) 600-400 MG-UNIT CHEW Take 1 chew tab by mouth 4 times daily 1/13/1996  Yes Reported, Patient   escitalopram (LEXAPRO) 20 MG tablet Take 20 mg by mouth daily 3/2/18  Yes Reported, Patient   Ferrous Sulfate (IRON) 325 (65 Fe) MG tablet Take 1 tablet by mouth daily 2/10/18  Yes Reported, Patient   labetalol (NORMODYNE) 200 MG tablet Take 200 mg by mouth daily 7/3/17  Yes Reported, Patient   levothyroxine (SYNTHROID/LEVOTHROID) 200 MCG tablet Take 200 mcg by mouth daily 1/13/1996  Yes Reported, Patient   lisinopril (PRINIVIL/ZESTRIL) 20 MG tablet Take 20 mg by mouth daily 5/1/01  Yes Reported, Patient   montelukast (SINGULAIR) 10 MG tablet Take 10 mg by mouth daily 5/1/1995  Yes Reported, Patient   simvastatin (ZOCOR) 20 MG tablet Take 20 mg by mouth daily   Yes Reported, Patient       Allergies  No Known Allergies    Review of systems  A 10-point review of systems was negative    Examination  Wt 90.9 kg (200 lb 4.8 oz)  BMI 34.38 kg/m2  Body mass index is 34.38 kg/(m^2).    Gen- well, NAD, A+Ox3, normal color  Lym- no palpable LAD (cervical, occipital, submandibular, submental, pre-/post-auricular, parotid)  CVS- RRR  RS- CTA  Abd- obese, striae, soft, non-tender, Carnet's sign positive, no obvious ascites or organomegaly on palpation or percussion, BS+  Extr- hands normal, no ALEX  Skin- no rash or jaundice  Neuro- no asterixis  Psych- normal mood    Laboratory  Lab Results   Component Value Date     07/31/2018    POTASSIUM 3.6 07/31/2018    CHLORIDE 110 07/31/2018    CO2 21 07/31/2018    BUN 12 07/31/2018    CR 0.64 07/31/2018       Lab Results   Component Value Date    BILITOTAL 0.6 07/31/2018    ALT 36 07/31/2018    AST 39 07/31/2018    ALKPHOS 86 07/31/2018        Lab Results   Component Value Date    ALBUMIN 3.2 07/31/2018    PROTTOTAL 7.3 07/31/2018        Lab Results   Component Value Date    WBC 4.6 07/31/2018    HGB 13.6 07/31/2018    MCV 89 07/31/2018     07/31/2018       Lab Results   Component Value Date    INR 1.16 07/31/2018       Radiology  Nil recent    Assessment  63 year old female who presents for follow-up of abnormal liver function tests.  Liver function tests remain normal since last clinic visit.  Will continue to monitor patient due to positive autoimmune markers, new onset borderline thrombocytopenia and borderline hypoalbuminemia.  Will obtain abdominal ultrasound to evaluate for organomegaly and ascites given the patient's body habitus.    Plan  1.  Abd U/S  2.  Recheck CMP, INR, CBC, F-actin Ab, REECE, AMA in 3 months  3.  Follow-up in 6 months    Tang Maddox MD  Hepatology  [facility]

## 2018-10-23 ENCOUNTER — HOSPITAL ENCOUNTER (OUTPATIENT)
Dept: LAB | Facility: CLINIC | Age: 63
End: 2018-10-23
Attending: INTERNAL MEDICINE
Payer: COMMERCIAL

## 2018-10-23 ENCOUNTER — HOSPITAL ENCOUNTER (OUTPATIENT)
Dept: ULTRASOUND IMAGING | Facility: CLINIC | Age: 63
Discharge: HOME | End: 2018-10-23
Attending: INTERNAL MEDICINE | Admitting: INTERNAL MEDICINE
Payer: COMMERCIAL

## 2018-10-23 DIAGNOSIS — E61.1 IRON DEFICIENCY: ICD-10-CM

## 2018-10-23 DIAGNOSIS — R94.5 ABNORMAL RESULTS OF LIVER FUNCTION STUDIES: ICD-10-CM

## 2018-10-23 LAB
ALBUMIN SERPL-MCNC: 3.4 G/DL (ref 3.4–5)
ALP SERPL-CCNC: 79 U/L (ref 40–150)
ALT SERPL W P-5'-P-CCNC: 40 U/L (ref 0–50)
ANION GAP SERPL CALCULATED.3IONS-SCNC: 5 MMOL/L (ref 3–14)
AST SERPL W P-5'-P-CCNC: 48 U/L (ref 0–45)
BILIRUB DIRECT SERPL-MCNC: 0.2 MG/DL (ref 0–0.2)
BILIRUB SERPL-MCNC: 0.5 MG/DL (ref 0.2–1.3)
BUN SERPL-MCNC: 11 MG/DL (ref 7–30)
CALCIUM SERPL-MCNC: 7.6 MG/DL (ref 8.5–10.1)
CHLORIDE SERPL-SCNC: 104 MMOL/L (ref 94–109)
CO2 SERPL-SCNC: 32 MMOL/L (ref 20–32)
CREAT SERPL-MCNC: 0.7 MG/DL (ref 0.52–1.04)
ERYTHROCYTE [DISTWIDTH] IN BLOOD BY AUTOMATED COUNT: 14.5 % (ref 10–15)
FERRITIN SERPL-MCNC: 71 NG/ML (ref 8–252)
GFR SERPL CREATININE-BSD FRML MDRD: 85 ML/MIN/1.7M2
GLUCOSE SERPL-MCNC: 102 MG/DL (ref 70–99)
HCT VFR BLD AUTO: 39.4 % (ref 35–47)
HGB BLD-MCNC: 13.3 G/DL (ref 11.7–15.7)
INR PPP: 1.11 (ref 0.86–1.14)
IRON SATN MFR SERPL: 17 % (ref 15–46)
IRON SERPL-MCNC: 57 UG/DL (ref 35–180)
MCH RBC QN AUTO: 29.8 PG (ref 26.5–33)
MCHC RBC AUTO-ENTMCNC: 33.8 G/DL (ref 31.5–36.5)
MCV RBC AUTO: 88 FL (ref 78–100)
PLATELET # BLD AUTO: 117 10E9/L (ref 150–450)
POTASSIUM SERPL-SCNC: 3.8 MMOL/L (ref 3.4–5.3)
PROT SERPL-MCNC: 7.4 G/DL (ref 6.8–8.8)
RBC # BLD AUTO: 4.46 10E12/L (ref 3.8–5.2)
SODIUM SERPL-SCNC: 141 MMOL/L (ref 133–144)
TIBC SERPL-MCNC: 343 UG/DL (ref 240–430)
WBC # BLD AUTO: 3.9 10E9/L (ref 4–11)

## 2018-10-23 PROCEDURE — 76700 US EXAM ABDOM COMPLETE: CPT

## 2018-10-23 PROCEDURE — 80076 HEPATIC FUNCTION PANEL: CPT | Performed by: INTERNAL MEDICINE

## 2018-10-23 PROCEDURE — 83550 IRON BINDING TEST: CPT | Performed by: INTERNAL MEDICINE

## 2018-10-23 PROCEDURE — 85027 COMPLETE CBC AUTOMATED: CPT | Performed by: INTERNAL MEDICINE

## 2018-10-23 PROCEDURE — 86039 ANTINUCLEAR ANTIBODIES (ANA): CPT | Performed by: INTERNAL MEDICINE

## 2018-10-23 PROCEDURE — 36415 COLL VENOUS BLD VENIPUNCTURE: CPT | Performed by: INTERNAL MEDICINE

## 2018-10-23 PROCEDURE — 83516 IMMUNOASSAY NONANTIBODY: CPT | Performed by: INTERNAL MEDICINE

## 2018-10-23 PROCEDURE — 86256 FLUORESCENT ANTIBODY TITER: CPT | Performed by: INTERNAL MEDICINE

## 2018-10-23 PROCEDURE — 82728 ASSAY OF FERRITIN: CPT | Performed by: INTERNAL MEDICINE

## 2018-10-23 PROCEDURE — 85610 PROTHROMBIN TIME: CPT | Performed by: INTERNAL MEDICINE

## 2018-10-23 PROCEDURE — 83540 ASSAY OF IRON: CPT | Performed by: INTERNAL MEDICINE

## 2018-10-23 PROCEDURE — 80048 BASIC METABOLIC PNL TOTAL CA: CPT | Performed by: INTERNAL MEDICINE

## 2018-10-23 PROCEDURE — 86038 ANTINUCLEAR ANTIBODIES: CPT | Performed by: INTERNAL MEDICINE

## 2018-10-24 LAB
MITOCHONDRIA M2 IGG SER-ACNC: 1 U/ML
SMA IGG SER-ACNC: 40 UNITS (ref 0–19)
SMOOTH MUSCLE ABY IGG TITER: ABNORMAL

## 2018-10-25 LAB
ANA PAT SER IF-IMP: ABNORMAL
ANA PAT SER IF-IMP: ABNORMAL
ANA SER QL IF: POSITIVE
ANA TITR SER IF: ABNORMAL {TITER}
ANA TITR SER IF: ABNORMAL {TITER}

## 2019-01-31 DIAGNOSIS — R94.5 ABNORMAL RESULTS OF LIVER FUNCTION STUDIES: Primary | ICD-10-CM

## 2019-02-04 ENCOUNTER — OFFICE VISIT (OUTPATIENT)
Dept: GASTROENTEROLOGY | Facility: CLINIC | Age: 64
End: 2019-02-04
Attending: INTERNAL MEDICINE
Payer: COMMERCIAL

## 2019-02-04 VITALS
SYSTOLIC BLOOD PRESSURE: 120 MMHG | HEART RATE: 71 BPM | DIASTOLIC BLOOD PRESSURE: 72 MMHG | HEIGHT: 64 IN | TEMPERATURE: 97.9 F | OXYGEN SATURATION: 100 % | WEIGHT: 207 LBS | BODY MASS INDEX: 35.34 KG/M2

## 2019-02-04 DIAGNOSIS — R94.5 ABNORMAL RESULTS OF LIVER FUNCTION STUDIES: Primary | ICD-10-CM

## 2019-02-04 DIAGNOSIS — R94.5 ABNORMAL RESULTS OF LIVER FUNCTION STUDIES: ICD-10-CM

## 2019-02-04 DIAGNOSIS — R79.89 ABNORMAL LIVER FUNCTION TESTS: ICD-10-CM

## 2019-02-04 LAB
ALBUMIN SERPL-MCNC: 3.2 G/DL (ref 3.4–5)
ALP SERPL-CCNC: 80 U/L (ref 40–150)
ALT SERPL W P-5'-P-CCNC: 47 U/L (ref 0–50)
ANION GAP SERPL CALCULATED.3IONS-SCNC: 6 MMOL/L (ref 3–14)
AST SERPL W P-5'-P-CCNC: 57 U/L (ref 0–45)
BILIRUB DIRECT SERPL-MCNC: 0.3 MG/DL (ref 0–0.2)
BILIRUB SERPL-MCNC: 0.7 MG/DL (ref 0.2–1.3)
BUN SERPL-MCNC: 8 MG/DL (ref 7–30)
CALCIUM SERPL-MCNC: 7 MG/DL (ref 8.5–10.1)
CHLORIDE SERPL-SCNC: 106 MMOL/L (ref 94–109)
CO2 SERPL-SCNC: 28 MMOL/L (ref 20–32)
CREAT SERPL-MCNC: 0.67 MG/DL (ref 0.52–1.04)
ERYTHROCYTE [DISTWIDTH] IN BLOOD BY AUTOMATED COUNT: 13.3 % (ref 10–15)
GFR SERPL CREATININE-BSD FRML MDRD: >90 ML/MIN/{1.73_M2}
GLUCOSE SERPL-MCNC: 143 MG/DL (ref 70–99)
HCT VFR BLD AUTO: 43.1 % (ref 35–47)
HGB BLD-MCNC: 14 G/DL (ref 11.7–15.7)
INR PPP: 1.3 (ref 0.86–1.14)
MCH RBC QN AUTO: 29 PG (ref 26.5–33)
MCHC RBC AUTO-ENTMCNC: 32.5 G/DL (ref 31.5–36.5)
MCV RBC AUTO: 89 FL (ref 78–100)
PLATELET # BLD AUTO: 141 10E9/L (ref 150–450)
POTASSIUM SERPL-SCNC: 3.6 MMOL/L (ref 3.4–5.3)
PROT SERPL-MCNC: 7.8 G/DL (ref 6.8–8.8)
RBC # BLD AUTO: 4.83 10E12/L (ref 3.8–5.2)
SODIUM SERPL-SCNC: 140 MMOL/L (ref 133–144)
WBC # BLD AUTO: 5.6 10E9/L (ref 4–11)

## 2019-02-04 PROCEDURE — 80048 BASIC METABOLIC PNL TOTAL CA: CPT | Performed by: INTERNAL MEDICINE

## 2019-02-04 PROCEDURE — 85610 PROTHROMBIN TIME: CPT | Performed by: INTERNAL MEDICINE

## 2019-02-04 PROCEDURE — 85027 COMPLETE CBC AUTOMATED: CPT | Performed by: INTERNAL MEDICINE

## 2019-02-04 PROCEDURE — G0463 HOSPITAL OUTPT CLINIC VISIT: HCPCS | Mod: ZF

## 2019-02-04 PROCEDURE — 36415 COLL VENOUS BLD VENIPUNCTURE: CPT | Performed by: INTERNAL MEDICINE

## 2019-02-04 PROCEDURE — 80076 HEPATIC FUNCTION PANEL: CPT | Performed by: INTERNAL MEDICINE

## 2019-02-04 RX ORDER — AMLODIPINE BESYLATE 2.5 MG/1
TABLET ORAL DAILY
Refills: 0 | COMMUNITY
Start: 2019-01-30 | End: 2021-06-16

## 2019-02-04 ASSESSMENT — PAIN SCALES - GENERAL: PAINLEVEL: NO PAIN (0)

## 2019-02-04 ASSESSMENT — MIFFLIN-ST. JEOR: SCORE: 1468.95

## 2019-02-04 NOTE — PROGRESS NOTES
[facility]    Hepatology follow-up    Follow-up visit for abnormal liver function tests    Subjective:  63 year old female    The patient comes to clinic this afternoon with her  for follow-up of abnormal liver function tests.  Last clinic visit in 07/2018.  Follow-up liver tests in October were normal although F-actin antibody remained positive.  Abdominal ultrasound was checked and was consistent with a fatty liver appearance, no ascites and ?splenomegaly.  The patient recently saw cardiology for hypertension.  Echocardiogram showed elevated right-sided pressures, asymmetric LV hypertrophy and normal LV systolic function.  CT coronary angiogram showed mild luminal irregularities and mild RV hypertrophy.  She will be seeing cardiology soon to follow-up on these findings.  The patient denies any ER presentations or hospital admissions since last clinic visit.      The patient feels well today.  She denies any chest pain, dyspnea or palpitations.  Denies any symptoms specific to liver disease.      The patient denies jaundice, lower extremity edema, abdominal distention, lethargy or confusion.      No history of melena, hematemesis or hematochezia.      The patient denies any fevers, sweats or chills.      Weight has increased 7 pounds since last clinic visit.  Appetite remains the same.      The patient drinks alcohol on special occasions.  She last drank alcohol watching the Super Bowl yesterday.       Medical hx Surgical hx   Past Medical History:   Diagnosis Date     Asthma      Depression with anxiety      GERD (gastroesophageal reflux disease)      Hyperlipidemia      Hypertension      Obesity      Thyroid cancer (H) 1996      Past Surgical History:   Procedure Laterality Date     C TOTAL ABDOM HYSTERECTOMY       CHOLECYSTECTOMY       SALPINGO-OOPHORECTOMY BILATERAL       SHOULDER SURGERY       THYROIDECTOMY  1996          Medications  Prior to Admission medications   "  Medication Sig Start Date End Date Taking? Authorizing Provider   albuterol (PROAIR HFA/PROVENTIL HFA/VENTOLIN HFA) 108 (90 Base) MCG/ACT Inhaler Inhale 2 puffs into the lungs every 6 hours as needed for shortness of breath / dyspnea or wheezing   Yes Reported, Patient   amLODIPine (NORVASC) 2.5 MG tablet  1/30/19  Yes Reported, Patient   calcitRIOL (ROCALTROL) 0.25 MCG capsule Take 0.25 mcg by mouth daily 8/15/16  Yes Reported, Patient   calcium carbonate-vitamin D (CALCIUM 600/VITAMIN D) 600-400 MG-UNIT CHEW Take 4 chew tab by mouth 4 times daily  1/13/1996  Yes Reported, Patient   escitalopram (LEXAPRO) 20 MG tablet Take 20 mg by mouth daily 3/2/18  Yes Reported, Patient   Ferrous Sulfate (IRON) 325 (65 Fe) MG tablet Take 1 tablet by mouth daily 2/10/18  Yes Reported, Patient   labetalol (NORMODYNE) 200 MG tablet Take 200 mg by mouth daily 7/3/17  Yes Reported, Patient   levothyroxine (SYNTHROID/LEVOTHROID) 200 MCG tablet Take 200 mcg by mouth daily 1/13/1996  Yes Reported, Patient   lisinopril (PRINIVIL/ZESTRIL) 20 MG tablet Take 20 mg by mouth daily 5/1/01  Yes Reported, Patient   montelukast (SINGULAIR) 10 MG tablet Take 10 mg by mouth daily 5/1/1995  Yes Reported, Patient   simvastatin (ZOCOR) 20 MG tablet Take 20 mg by mouth daily   Yes Reported, Patient   topiramate (TOPAMAX) 25 MG tablet Take 2 tablets by mouth At Bedtime 6/12/18  Yes Reported, Patient       Allergies  No Known Allergies    Review of systems  A 10-point review of systems was negative    Examination  /72   Pulse 71   Temp 97.9  F (36.6  C) (Oral)   Ht 1.626 m (5' 4\")   Wt 93.9 kg (207 lb)   BMI 35.53 kg/m    Body mass index is 35.53 kg/m .    Gen- well, NAD, A+Ox3, normal color  CVS- RRR  RS- CTA  Abd- obese, SNT, no ascites or organomegaly on palpation or percussion, BS+  Extr- hands normal, no ALEX  Skin- no rash or jaundice  Neuro- no asterixis  Psych- normal mood    Laboratory  Lab Results   Component Value Date     " 02/04/2019    POTASSIUM 3.6 02/04/2019    CHLORIDE 106 02/04/2019    CO2 28 02/04/2019    BUN 8 02/04/2019    CR 0.67 02/04/2019       Lab Results   Component Value Date    BILITOTAL 0.7 02/04/2019    ALT 47 02/04/2019    AST 57 02/04/2019    ALKPHOS 80 02/04/2019       Lab Results   Component Value Date    ALBUMIN 3.2 02/04/2019    PROTTOTAL 7.8 02/04/2019        Lab Results   Component Value Date    WBC 5.6 02/04/2019    HGB 14.0 02/04/2019    MCV 89 02/04/2019     02/04/2019       Lab Results   Component Value Date    INR 1.30 02/04/2019       Radiology  CT coronary angiogram Jan 2019 reviewed- MLI, pericardial thickening, asymmetric hypertrophy  Echocardiogram Jan 2019 reviewed- EF 70%, LV hypertrophy, increased pulmonary pressures    Assessment  63 year old female who presents for follow-up of abnormal liver function tests and borderline thrombocytopenia of unclear etiology.  No evidence of cirrhosis.  Liver function tests stable.  Weight gain and recent alcohol use are likely contributing to mildly abnormal liver function tests.  Will continue to monitor clinically for now.    Plan  1.  Weight loss with diet and exercise  2.  Check LFTs in 6 months  3.  Follow-up in 12 months    Tang Maddox MD  Hepatology  [facility]

## 2019-02-04 NOTE — LETTER
RE: Geri Zamorano  [address]   [place] [address]     Dear Colleague,    Thank you for referring your patient, Geri Zamorano, to the [facility] HEPATOLOGY at [facility]. Please see a copy of my visit note below.    [facility]    Hepatology follow-up    Follow-up visit for abnormal liver function tests    Subjective:  63 year old female    The patient comes to clinic this afternoon with her  for follow-up of abnormal liver function tests.  Last clinic visit in 07/2018.  Follow-up liver tests in October were normal although F-actin antibody remained positive.  Abdominal ultrasound was checked and was consistent with a fatty liver appearance, no ascites and ?splenomegaly.  The patient recently saw cardiology for hypertension.  Echocardiogram showed elevated right-sided pressures, asymmetric LV hypertrophy and normal LV systolic function.  CT coronary angiogram showed mild luminal irregularities and mild RV hypertrophy.  She will be seeing cardiology soon to follow-up on these findings.  The patient denies any ER presentations or hospital admissions since last clinic visit.      The patient feels well today.  She denies any chest pain, dyspnea or palpitations.  Denies any symptoms specific to liver disease.      The patient denies jaundice, lower extremity edema, abdominal distention, lethargy or confusion.      No history of melena, hematemesis or hematochezia.      The patient denies any fevers, sweats or chills.      Weight has increased 7 pounds since last clinic visit.  Appetite remains the same.      The patient drinks alcohol on special occasions.  She last drank alcohol watching the Super Bowl yesterday.       Medical hx Surgical hx   Past Medical History:   Diagnosis Date     Asthma      Depression with anxiety      GERD (gastroesophageal reflux disease)      Hyperlipidemia      Hypertension      Obesity      Thyroid cancer (H) 1996  "     Past Surgical History:   Procedure Laterality Date     C TOTAL ABDOM HYSTERECTOMY       CHOLECYSTECTOMY       SALPINGO-OOPHORECTOMY BILATERAL       SHOULDER SURGERY       THYROIDECTOMY  1996          Medications  Prior to Admission medications    Medication Sig Start Date End Date Taking? Authorizing Provider   albuterol (PROAIR HFA/PROVENTIL HFA/VENTOLIN HFA) 108 (90 Base) MCG/ACT Inhaler Inhale 2 puffs into the lungs every 6 hours as needed for shortness of breath / dyspnea or wheezing   Yes Reported, Patient   amLODIPine (NORVASC) 2.5 MG tablet  1/30/19  Yes Reported, Patient   calcitRIOL (ROCALTROL) 0.25 MCG capsule Take 0.25 mcg by mouth daily 8/15/16  Yes Reported, Patient   calcium carbonate-vitamin D (CALCIUM 600/VITAMIN D) 600-400 MG-UNIT CHEW Take 4 chew tab by mouth 4 times daily  1/13/1996  Yes Reported, Patient   escitalopram (LEXAPRO) 20 MG tablet Take 20 mg by mouth daily 3/2/18  Yes Reported, Patient   Ferrous Sulfate (IRON) 325 (65 Fe) MG tablet Take 1 tablet by mouth daily 2/10/18  Yes Reported, Patient   labetalol (NORMODYNE) 200 MG tablet Take 200 mg by mouth daily 7/3/17  Yes Reported, Patient   levothyroxine (SYNTHROID/LEVOTHROID) 200 MCG tablet Take 200 mcg by mouth daily 1/13/1996  Yes Reported, Patient   lisinopril (PRINIVIL/ZESTRIL) 20 MG tablet Take 20 mg by mouth daily 5/1/01  Yes Reported, Patient   montelukast (SINGULAIR) 10 MG tablet Take 10 mg by mouth daily 5/1/1995  Yes Reported, Patient   simvastatin (ZOCOR) 20 MG tablet Take 20 mg by mouth daily   Yes Reported, Patient   topiramate (TOPAMAX) 25 MG tablet Take 2 tablets by mouth At Bedtime 6/12/18  Yes Reported, Patient     Allergies  No Known Allergies    Examination  /72   Pulse 71   Temp 97.9  F (36.6  C) (Oral)   Ht 1.626 m (5' 4\")   Wt 93.9 kg (207 lb)   BMI 35.53 kg/m     Body mass index is 35.53 kg/m .    Gen- well, NAD, A+Ox3, normal color  CVS- RRR  RS- CTA  Abd- obese, SNT, no ascites or organomegaly on " palpation or percussion, BS+  Extr- hands normal, no ALEX  Skin- no rash or jaundice  Neuro- no asterixis  Psych- normal mood    Laboratory  Lab Results   Component Value Date     02/04/2019    POTASSIUM 3.6 02/04/2019    CHLORIDE 106 02/04/2019    CO2 28 02/04/2019    BUN 8 02/04/2019    CR 0.67 02/04/2019       Lab Results   Component Value Date    BILITOTAL 0.7 02/04/2019    ALT 47 02/04/2019    AST 57 02/04/2019    ALKPHOS 80 02/04/2019       Lab Results   Component Value Date    ALBUMIN 3.2 02/04/2019    PROTTOTAL 7.8 02/04/2019        Lab Results   Component Value Date    WBC 5.6 02/04/2019    HGB 14.0 02/04/2019    MCV 89 02/04/2019     02/04/2019       Lab Results   Component Value Date    INR 1.30 02/04/2019     Radiology  CT coronary angiogram Jan 2019 reviewed- MLI, pericardial thickening, asymmetric hypertrophy  Echocardiogram Jan 2019 reviewed- EF 70%, LV hypertrophy, increased pulmonary pressures    Assessment  63 year old female who presents for follow-up of abnormal liver function tests and borderline thrombocytopenia of unclear etiology.  No evidence of cirrhosis.  Liver function tests stable.  Weight gain and recent alcohol use are likely contributing to mildly abnormal liver function tests.  Will continue to monitor clinically for now.    Plan  1.  Weight loss with diet and exercise  2.  Check LFTs in 6 months  3.  Follow-up in 12 months    Tang Maddox MD  Hepatology  [facility]

## 2019-02-04 NOTE — PATIENT INSTRUCTIONS
Plan  1.  Weight loss with diet and exercise  2.  Recheck liver tests in 6 months  3.  Follow-up with me in the office in 12 months    Tang Maddox MD  Hepatology  [facility]

## 2019-02-04 NOTE — NURSING NOTE
"Chief Complaint   Patient presents with     RECHECK     f/u for abn LFTs/borderline thrombocytopenia     /72   Pulse 71   Temp 97.9  F (36.6  C) (Oral)   Ht 1.626 m (5' 4\")   Wt 93.9 kg (207 lb)   BMI 35.53 kg/m    Adela Frias CMA    "

## 2019-02-04 NOTE — LETTER
RE: Geri Zamorano  [address]   [place] [address]       [facility]    Hepatology follow-up    Follow-up visit for abnormal liver function tests    Subjective:  63 year old female    The patient comes to clinic this afternoon with her  for follow-up of abnormal liver function tests.  Last clinic visit in 07/2018.  Follow-up liver tests in October were normal although F-actin antibody remained positive.  Abdominal ultrasound was checked and was consistent with a fatty liver appearance, no ascites and ?splenomegaly.  The patient recently saw cardiology for hypertension.  Echocardiogram showed elevated right-sided pressures, asymmetric LV hypertrophy and normal LV systolic function.  CT coronary angiogram showed mild luminal irregularities and mild RV hypertrophy.  She will be seeing cardiology soon to follow-up on these findings.  The patient denies any ER presentations or hospital admissions since last clinic visit.      The patient feels well today.  She denies any chest pain, dyspnea or palpitations.  Denies any symptoms specific to liver disease.      The patient denies jaundice, lower extremity edema, abdominal distention, lethargy or confusion.      No history of melena, hematemesis or hematochezia.      The patient denies any fevers, sweats or chills.      Weight has increased 7 pounds since last clinic visit.  Appetite remains the same.      The patient drinks alcohol on special occasions.  She last drank alcohol watching the Super Bowl yesterday.       Medical hx Surgical hx   Past Medical History:   Diagnosis Date     Asthma      Depression with anxiety      GERD (gastroesophageal reflux disease)      Hyperlipidemia      Hypertension      Obesity      Thyroid cancer (H) 1996      Past Surgical History:   Procedure Laterality Date     C TOTAL ABDOM HYSTERECTOMY       CHOLECYSTECTOMY       SALPINGO-OOPHORECTOMY BILATERAL       SHOULDER SURGERY       THYROIDECTOMY  1996  "         Medications  Prior to Admission medications    Medication Sig Start Date End Date Taking? Authorizing Provider   albuterol (PROAIR HFA/PROVENTIL HFA/VENTOLIN HFA) 108 (90 Base) MCG/ACT Inhaler Inhale 2 puffs into the lungs every 6 hours as needed for shortness of breath / dyspnea or wheezing   Yes Reported, Patient   amLODIPine (NORVASC) 2.5 MG tablet  1/30/19  Yes Reported, Patient   calcitRIOL (ROCALTROL) 0.25 MCG capsule Take 0.25 mcg by mouth daily 8/15/16  Yes Reported, Patient   calcium carbonate-vitamin D (CALCIUM 600/VITAMIN D) 600-400 MG-UNIT CHEW Take 4 chew tab by mouth 4 times daily  1/13/1996  Yes Reported, Patient   escitalopram (LEXAPRO) 20 MG tablet Take 20 mg by mouth daily 3/2/18  Yes Reported, Patient   Ferrous Sulfate (IRON) 325 (65 Fe) MG tablet Take 1 tablet by mouth daily 2/10/18  Yes Reported, Patient   labetalol (NORMODYNE) 200 MG tablet Take 200 mg by mouth daily 7/3/17  Yes Reported, Patient   levothyroxine (SYNTHROID/LEVOTHROID) 200 MCG tablet Take 200 mcg by mouth daily 1/13/1996  Yes Reported, Patient   lisinopril (PRINIVIL/ZESTRIL) 20 MG tablet Take 20 mg by mouth daily 5/1/01  Yes Reported, Patient   montelukast (SINGULAIR) 10 MG tablet Take 10 mg by mouth daily 5/1/1995  Yes Reported, Patient   simvastatin (ZOCOR) 20 MG tablet Take 20 mg by mouth daily   Yes Reported, Patient   topiramate (TOPAMAX) 25 MG tablet Take 2 tablets by mouth At Bedtime 6/12/18  Yes Reported, Patient     Allergies  No Known Allergies    Examination  /72   Pulse 71   Temp 97.9  F (36.6  C) (Oral)   Ht 1.626 m (5' 4\")   Wt 93.9 kg (207 lb)   BMI 35.53 kg/m     Body mass index is 35.53 kg/m .    Gen- well, NAD, A+Ox3, normal color  CVS- RRR  RS- CTA  Abd- obese, SNT, no ascites or organomegaly on palpation or percussion, BS+  Extr- hands normal, no ALEX  Skin- no rash or jaundice  Neuro- no asterixis  Psych- normal mood    Laboratory  Lab Results   Component Value Date     02/04/2019    " POTASSIUM 3.6 02/04/2019    CHLORIDE 106 02/04/2019    CO2 28 02/04/2019    BUN 8 02/04/2019    CR 0.67 02/04/2019       Lab Results   Component Value Date    BILITOTAL 0.7 02/04/2019    ALT 47 02/04/2019    AST 57 02/04/2019    ALKPHOS 80 02/04/2019       Lab Results   Component Value Date    ALBUMIN 3.2 02/04/2019    PROTTOTAL 7.8 02/04/2019        Lab Results   Component Value Date    WBC 5.6 02/04/2019    HGB 14.0 02/04/2019    MCV 89 02/04/2019     02/04/2019       Lab Results   Component Value Date    INR 1.30 02/04/2019       Radiology  CT coronary angiogram Jan 2019 reviewed- MLI, pericardial thickening, asymmetric hypertrophy  Echocardiogram Jan 2019 reviewed- EF 70%, LV hypertrophy, increased pulmonary pressures    Assessment  63 year old female who presents for follow-up of abnormal liver function tests and borderline thrombocytopenia of unclear etiology.  No evidence of cirrhosis.  Liver function tests stable.  Weight gain and recent alcohol use are likely contributing to mildly abnormal liver function tests.  Will continue to monitor clinically for now.    Plan  1.  Weight loss with diet and exercise  2.  Check LFTs in 6 months  3.  Follow-up in 12 months    Tang Maddox MD  Hepatology  [facility]

## 2019-11-18 ENCOUNTER — TRANSFERRED RECORDS (OUTPATIENT)
Dept: HEALTH INFORMATION MANAGEMENT | Facility: CLINIC | Age: 64
End: 2019-11-18

## 2019-11-25 ENCOUNTER — TRANSFERRED RECORDS (OUTPATIENT)
Dept: HEALTH INFORMATION MANAGEMENT | Facility: CLINIC | Age: 64
End: 2019-11-25

## 2019-12-02 ENCOUNTER — TRANSFERRED RECORDS (OUTPATIENT)
Dept: HEALTH INFORMATION MANAGEMENT | Facility: CLINIC | Age: 64
End: 2019-12-02

## 2019-12-06 ENCOUNTER — MYC MEDICAL ADVICE (OUTPATIENT)
Dept: GASTROENTEROLOGY | Facility: CLINIC | Age: 64
End: 2019-12-06

## 2019-12-06 NOTE — TELEPHONE ENCOUNTER
Called patient and let her know Dr. Mojica had already discussed with Dr. Maddox about patient's recent blood tests and patient's appointment with Dr. Maddox is rescheduled from February to January 6, 2020.       Mila PATEL LPN

## 2019-12-12 ENCOUNTER — TRANSFERRED RECORDS (OUTPATIENT)
Dept: HEALTH INFORMATION MANAGEMENT | Facility: CLINIC | Age: 64
End: 2019-12-12

## 2020-01-02 ENCOUNTER — TELEPHONE (OUTPATIENT)
Dept: GASTROENTEROLOGY | Facility: CLINIC | Age: 65
End: 2020-01-02

## 2020-01-06 ENCOUNTER — OFFICE VISIT (OUTPATIENT)
Dept: GASTROENTEROLOGY | Facility: CLINIC | Age: 65
End: 2020-01-06
Attending: INTERNAL MEDICINE
Payer: COMMERCIAL

## 2020-01-06 VITALS
OXYGEN SATURATION: 97 % | BODY MASS INDEX: 36.15 KG/M2 | SYSTOLIC BLOOD PRESSURE: 129 MMHG | DIASTOLIC BLOOD PRESSURE: 60 MMHG | WEIGHT: 210.6 LBS | TEMPERATURE: 98.6 F | HEART RATE: 65 BPM

## 2020-01-06 DIAGNOSIS — R79.89 ABNORMAL LIVER FUNCTION TESTS: Primary | ICD-10-CM

## 2020-01-06 DIAGNOSIS — R94.5 ABNORMAL RESULTS OF LIVER FUNCTION STUDIES: ICD-10-CM

## 2020-01-06 LAB
ALBUMIN SERPL-MCNC: 3.1 G/DL (ref 3.4–5)
ALP SERPL-CCNC: 87 U/L (ref 40–150)
ALT SERPL W P-5'-P-CCNC: 30 U/L (ref 0–50)
ANION GAP SERPL CALCULATED.3IONS-SCNC: 5 MMOL/L (ref 3–14)
AST SERPL W P-5'-P-CCNC: 33 U/L (ref 0–45)
BILIRUB DIRECT SERPL-MCNC: 0.4 MG/DL (ref 0–0.2)
BILIRUB SERPL-MCNC: 1 MG/DL (ref 0.2–1.3)
BUN SERPL-MCNC: 10 MG/DL (ref 7–30)
CALCIUM SERPL-MCNC: 8 MG/DL (ref 8.5–10.1)
CHLORIDE SERPL-SCNC: 111 MMOL/L (ref 94–109)
CO2 SERPL-SCNC: 26 MMOL/L (ref 20–32)
CREAT SERPL-MCNC: 0.66 MG/DL (ref 0.52–1.04)
ERYTHROCYTE [DISTWIDTH] IN BLOOD BY AUTOMATED COUNT: 16.5 % (ref 10–15)
GFR SERPL CREATININE-BSD FRML MDRD: >90 ML/MIN/{1.73_M2}
GLUCOSE SERPL-MCNC: 134 MG/DL (ref 70–99)
HCT VFR BLD AUTO: 38 % (ref 35–47)
HGB BLD-MCNC: 12.1 G/DL (ref 11.7–15.7)
INR PPP: 1.35 (ref 0.86–1.14)
MCH RBC QN AUTO: 28.6 PG (ref 26.5–33)
MCHC RBC AUTO-ENTMCNC: 31.8 G/DL (ref 31.5–36.5)
MCV RBC AUTO: 90 FL (ref 78–100)
PLATELET # BLD AUTO: 102 10E9/L (ref 150–450)
POTASSIUM SERPL-SCNC: 3.4 MMOL/L (ref 3.4–5.3)
PROT SERPL-MCNC: 7.5 G/DL (ref 6.8–8.8)
RBC # BLD AUTO: 4.23 10E12/L (ref 3.8–5.2)
SODIUM SERPL-SCNC: 141 MMOL/L (ref 133–144)
WBC # BLD AUTO: 4 10E9/L (ref 4–11)

## 2020-01-06 PROCEDURE — 80076 HEPATIC FUNCTION PANEL: CPT | Performed by: INTERNAL MEDICINE

## 2020-01-06 PROCEDURE — 82784 ASSAY IGA/IGD/IGG/IGM EACH: CPT | Performed by: INTERNAL MEDICINE

## 2020-01-06 PROCEDURE — 36415 COLL VENOUS BLD VENIPUNCTURE: CPT | Performed by: INTERNAL MEDICINE

## 2020-01-06 PROCEDURE — 85027 COMPLETE CBC AUTOMATED: CPT | Performed by: INTERNAL MEDICINE

## 2020-01-06 PROCEDURE — 85610 PROTHROMBIN TIME: CPT | Performed by: INTERNAL MEDICINE

## 2020-01-06 PROCEDURE — 80048 BASIC METABOLIC PNL TOTAL CA: CPT | Performed by: INTERNAL MEDICINE

## 2020-01-06 RX ORDER — TIOTROPIUM BROMIDE INHALATION SPRAY 3.12 UG/1
SPRAY, METERED RESPIRATORY (INHALATION)
COMMUNITY
Start: 2019-12-20 | End: 2024-09-11

## 2020-01-06 ASSESSMENT — PAIN SCALES - GENERAL: PAINLEVEL: MODERATE PAIN (4)

## 2020-01-06 NOTE — NURSING NOTE
Chief Complaint   Patient presents with     RECHECK     Abnormal LFT's       /60 (BP Location: Right arm, Patient Position: Sitting, Cuff Size: Adult Large)   Pulse 65   Temp 98.6  F (37  C) (Oral)   Wt 95.5 kg (210 lb 9.6 oz)   SpO2 97%   BMI 36.15 kg/m        Sasha Kim CMA    1/6/2020 2:04 PM

## 2020-01-06 NOTE — LETTER
1/6/2020       RE: Geri Zamorano  [address]  [place] [address]     Dear Colleague,    Thank you for referring your patient, Geri Zamorano, to the [facility] HEPATOLOGY at [facility]. Please see a copy of my visit note below.    [facility]    Hepatology follow-up    Follow-up visit for abnormal liver function tests    Subjective:  64 year old female    The patient comes to clinic this afternoon for follow-up of abnormal liver function tests.  Last clinic visit 02/2019.   The patient did see cardiology in 02/2019 as planned who thought that her echocardiogram changes were related to hypertension and referred her to pulmonology for management of asthma.   The patient was admitted to [facility] in 12/2019 with chest pain and was treated for ?pericarditis.  She recently saw hematology at [facility] for thrombocytopenia.  CT chest/abdomen/pelvis reportedly showed evidence of cirrhosis, varices and splenomegaly.  The patient is currently taking ibuprofen and colchicine for ?pericarditis.  She is set to undergo a follow-up echocardiogram next week.  She has a visit scheduled to see rheumatology at [facility] for a possible autoimmune condition later this week.      The patient is well.  She denies any symptoms specific to liver disease.      In addition, she denies chest pain, dyspnea, palpitations, presyncope, orthopnea or PND.      The patient denies jaundice, lower extremity edema, abdominal distention, lethargy or confusion.      No history of melena, hematemesis or hematochezia.      The patient denies fevers, sweats or chills.  She had an influenza vaccination earlier this season.      Weight is stable compared to last visit.  Appetite is good.      The patient has not drank alcohol for 2 months.  Prior to this, she would drink 1 alcoholic drink every 2 weeks.       Medical hx Surgical hx   Past Medical History:   Diagnosis Date      Asthma      Depression with anxiety      GERD (gastroesophageal reflux disease)      Hyperlipidemia      Hypertension      Obesity      Thyroid cancer (H) 1996      Past Surgical History:   Procedure Laterality Date     C TOTAL ABDOM HYSTERECTOMY       CHOLECYSTECTOMY       SALPINGO-OOPHORECTOMY BILATERAL       SHOULDER SURGERY       THYROIDECTOMY  1996          Medications  Prior to Admission medications    Medication Sig Start Date End Date Taking? Authorizing Provider   albuterol (PROAIR HFA/PROVENTIL HFA/VENTOLIN HFA) 108 (90 Base) MCG/ACT Inhaler Inhale 2 puffs into the lungs daily    Yes Reported, Patient   amLODIPine (NORVASC) 2.5 MG tablet daily  1/30/19  Yes Reported, Patient   BREO ELLIPTA 200-25 MCG/INH Inhaler INL 1 PUFF PO DAILY. RM AFTER U 6/24/19  Yes Reported, Patient   calcitRIOL (ROCALTROL) 0.25 MCG capsule Take 0.5 mcg by mouth daily  8/15/16  Yes Reported, Patient   calcium carbonate-vitamin D (CALCIUM 600/VITAMIN D) 600-400 MG-UNIT CHEW Take 4 chew tab by mouth 3 times daily  1/13/1996  Yes Reported, Patient   escitalopram (LEXAPRO) 20 MG tablet Take 20 mg by mouth daily 3/2/18  Yes Reported, Patient   labetalol (NORMODYNE) 200 MG tablet Take 150 mg by mouth 2 times daily  7/3/17  Yes Reported, Patient   levothyroxine (SYNTHROID/LEVOTHROID) 200 MCG tablet Take 250 mcg by mouth daily  1/13/1996  Yes Reported, Patient   montelukast (SINGULAIR) 10 MG tablet Take 10 mg by mouth daily 5/1/1995  Yes Reported, Patient   SPIRIVA RESPIMAT 2.5 MCG/ACT inhaler INL 2 PFS PO QD 12/20/19  Yes Reported, Patient   topiramate (TOPAMAX) 25 MG tablet Take 2 tablets by mouth At Bedtime 6/12/18  Yes Reported, Patient   Ferrous Sulfate (IRON) 325 (65 Fe) MG tablet Take 1 tablet by mouth daily 2/10/18   Reported, Patient   lisinopril (PRINIVIL/ZESTRIL) 20 MG tablet Take 20 mg by mouth daily 5/1/01   Reported, Patient   simvastatin (ZOCOR) 20 MG tablet Take 20 mg by mouth daily    Reported, Patient        Allergies  No Known Allergies    Review of systems  A 10-point review of systems was negative    Examination  /60 (BP Location: Right arm, Patient Position: Sitting, Cuff Size: Adult Large)   Pulse 65   Temp 98.6  F (37  C) (Oral)   Wt 95.5 kg (210 lb 9.6 oz)   SpO2 97%   BMI 36.15 kg/m     Body mass index is 36.15 kg/m .    Gen- well, NAD, A+Ox3, normal color  CVS- RRR  RS- CTA  Abd- obese, soft, non-tender, ?increased liver span, no ascites or splenomegaly on palpation or percussion, BS+  Extr- hands normal, no ALEX  Skin- no rash or jaundice  Neuro- no asterixis  Psych- normal mood    Laboratory  Lab Results   Component Value Date     01/06/2020    POTASSIUM 3.4 01/06/2020    CHLORIDE 111 01/06/2020    CO2 26 01/06/2020    BUN 10 01/06/2020    CR 0.66 01/06/2020       Lab Results   Component Value Date    BILITOTAL 1.0 01/06/2020    ALT 30 01/06/2020    AST 33 01/06/2020    ALKPHOS 87 01/06/2020       Lab Results   Component Value Date    ALBUMIN 3.1 01/06/2020    PROTTOTAL 7.5 01/06/2020        Lab Results   Component Value Date    WBC 4.0 01/06/2020    HGB 12.1 01/06/2020    MCV 90 01/06/2020     01/06/2020       Lab Results   Component Value Date    INR 1.35 01/06/2020       Radiology  Not available for review    Assessment  64-year-old female who presents for routine follow-up of abnormal liver function tests of unclear etiology now with radiographic diagnosis of cirrhosis.  Liver function tests remain normal since last clinic visit.  Platelets are lower compared to last visit.  Will obtain a liver biopsy to definitively diagnose/rule out cirrhosis to help determine etiology if present.  The radiographic diagnosis of cirrhosis is puzzling due to the presence of patchy portal fibrosis on liver biopsy <2 years ago.  Congestive hepatopathy can sometimes cause rapidly progressive liver disease but would then expect patient to have more severe cardiac dysfunction in this context.  If  cirrhosis is present, the patient will require an upper endoscopy to screen for esophageal varices.     Plan  1.  Obtain OSH records (CT scan, discharge summary)  2.  Liver biopsy  3.  Follow-up in 3 months    Tang Maddox MD  Hepatology  [facility]    Again, thank you for allowing me to participate in the care of your patient.      Sincerely,    Tang Maddox MD

## 2020-01-06 NOTE — LETTER
1/6/2020      RE: Geri Zamorano  [address]  [facility] [address]       [facility]    Hepatology follow-up    Follow-up visit for abnormal liver function tests    Subjective:  64 year old female    The patient comes to clinic this afternoon for follow-up of abnormal liver function tests.  Last clinic visit 02/2019.   The patient did see cardiology in 02/2019 as planned who thought that her echocardiogram changes were related to hypertension and referred her to pulmonology for management of asthma.   The patient was admitted to [facility] in 12/2019 with chest pain and was treated for ?pericarditis.  She recently saw hematology at [facility] for thrombocytopenia.  CT chest/abdomen/pelvis reportedly showed evidence of cirrhosis, varices and splenomegaly.  The patient is currently taking ibuprofen and colchicine for ?pericarditis.  She is set to undergo a follow-up echocardiogram next week.  She has a visit scheduled to see rheumatology at [facility] for a possible autoimmune condition later this week.      The patient is well.  She denies any symptoms specific to liver disease.      In addition, she denies chest pain, dyspnea, palpitations, presyncope, orthopnea or PND.      The patient denies jaundice, lower extremity edema, abdominal distention, lethargy or confusion.      No history of melena, hematemesis or hematochezia.      The patient denies fevers, sweats or chills.  She had an influenza vaccination earlier this season.      Weight is stable compared to last visit.  Appetite is good.      The patient has not drank alcohol for 2 months.  Prior to this, she would drink 1 alcoholic drink every 2 weeks.       Medical hx Surgical hx   Past Medical History:   Diagnosis Date     Asthma      Depression with anxiety      GERD (gastroesophageal reflux disease)      Hyperlipidemia      Hypertension      Obesity      Thyroid cancer (H) 1996      Past Surgical History:    Procedure Laterality Date     C TOTAL ABDOM HYSTERECTOMY       CHOLECYSTECTOMY       SALPINGO-OOPHORECTOMY BILATERAL       SHOULDER SURGERY       THYROIDECTOMY  1996          Medications  Prior to Admission medications    Medication Sig Start Date End Date Taking? Authorizing Provider   albuterol (PROAIR HFA/PROVENTIL HFA/VENTOLIN HFA) 108 (90 Base) MCG/ACT Inhaler Inhale 2 puffs into the lungs daily    Yes Reported, Patient   amLODIPine (NORVASC) 2.5 MG tablet daily  1/30/19  Yes Reported, Patient   BREO ELLIPTA 200-25 MCG/INH Inhaler INL 1 PUFF PO DAILY. RM AFTER U 6/24/19  Yes Reported, Patient   calcitRIOL (ROCALTROL) 0.25 MCG capsule Take 0.5 mcg by mouth daily  8/15/16  Yes Reported, Patient   calcium carbonate-vitamin D (CALCIUM 600/VITAMIN D) 600-400 MG-UNIT CHEW Take 4 chew tab by mouth 3 times daily  1/13/1996  Yes Reported, Patient   escitalopram (LEXAPRO) 20 MG tablet Take 20 mg by mouth daily 3/2/18  Yes Reported, Patient   labetalol (NORMODYNE) 200 MG tablet Take 150 mg by mouth 2 times daily  7/3/17  Yes Reported, Patient   levothyroxine (SYNTHROID/LEVOTHROID) 200 MCG tablet Take 250 mcg by mouth daily  1/13/1996  Yes Reported, Patient   montelukast (SINGULAIR) 10 MG tablet Take 10 mg by mouth daily 5/1/1995  Yes Reported, Patient   SPIRIVA RESPIMAT 2.5 MCG/ACT inhaler INL 2 PFS PO QD 12/20/19  Yes Reported, Patient   topiramate (TOPAMAX) 25 MG tablet Take 2 tablets by mouth At Bedtime 6/12/18  Yes Reported, Patient   Ferrous Sulfate (IRON) 325 (65 Fe) MG tablet Take 1 tablet by mouth daily 2/10/18   Reported, Patient   lisinopril (PRINIVIL/ZESTRIL) 20 MG tablet Take 20 mg by mouth daily 5/1/01   Reported, Patient   simvastatin (ZOCOR) 20 MG tablet Take 20 mg by mouth daily    Reported, Patient       Allergies  No Known Allergies    Review of systems  A 10-point review of systems was negative    Examination  /60 (BP Location: Right arm, Patient Position: Sitting, Cuff Size: Adult Large)    Pulse 65   Temp 98.6  F (37  C) (Oral)   Wt 95.5 kg (210 lb 9.6 oz)   SpO2 97%   BMI 36.15 kg/m     Body mass index is 36.15 kg/m .    Gen- well, NAD, A+Ox3, normal color  CVS- RRR  RS- CTA  Abd- obese, soft, non-tender, ?increased liver span, no ascites or splenomegaly on palpation or percussion, BS+  Extr- hands normal, no ALEX  Skin- no rash or jaundice  Neuro- no asterixis  Psych- normal mood    Laboratory  Lab Results   Component Value Date     01/06/2020    POTASSIUM 3.4 01/06/2020    CHLORIDE 111 01/06/2020    CO2 26 01/06/2020    BUN 10 01/06/2020    CR 0.66 01/06/2020       Lab Results   Component Value Date    BILITOTAL 1.0 01/06/2020    ALT 30 01/06/2020    AST 33 01/06/2020    ALKPHOS 87 01/06/2020       Lab Results   Component Value Date    ALBUMIN 3.1 01/06/2020    PROTTOTAL 7.5 01/06/2020        Lab Results   Component Value Date    WBC 4.0 01/06/2020    HGB 12.1 01/06/2020    MCV 90 01/06/2020     01/06/2020       Lab Results   Component Value Date    INR 1.35 01/06/2020       Radiology  Not available for review    Assessment  64-year-old female who presents for routine follow-up of abnormal liver function tests of unclear etiology now with radiographic diagnosis of cirrhosis.  Liver function tests remain normal since last clinic visit.  Platelets are lower compared to last visit.  Will obtain a liver biopsy to definitively diagnose/rule out cirrhosis to help determine etiology if present.  The radiographic diagnosis of cirrhosis is puzzling due to the presence of patchy portal fibrosis on liver biopsy <2 years ago.  Congestive hepatopathy can sometimes cause rapidly progressive liver disease but would then expect patient to have more severe cardiac dysfunction in this context.  If cirrhosis is present, the patient will require an upper endoscopy to screen for esophageal varices.     Plan  1.  Obtain OSH records (CT scan, discharge summary)  2.  Liver biopsy  3.  Follow-up in 3  months    Tang Maddox MD  Hepatology  [facility]    Tang Maddox MD

## 2020-01-06 NOTE — PROGRESS NOTES
[facility]    Hepatology follow-up    Follow-up visit for abnormal liver function tests    Subjective:  64 year old female    The patient comes to clinic this afternoon for follow-up of abnormal liver function tests.  Last clinic visit 02/2019.   The patient did see cardiology in 02/2019 as planned who thought that her echocardiogram changes were related to hypertension and referred her to pulmonology for management of asthma.   The patient was admitted to [facility] in 12/2019 with chest pain and was treated for ?pericarditis.  She recently saw hematology at [facility] for thrombocytopenia.  CT chest/abdomen/pelvis reportedly showed evidence of cirrhosis, varices and splenomegaly.  The patient is currently taking ibuprofen and colchicine for ?pericarditis.  She is set to undergo a follow-up echocardiogram next week.  She has a visit scheduled to see rheumatology at [facility] for a possible autoimmune condition later this week.      The patient is well.  She denies any symptoms specific to liver disease.      In addition, she denies chest pain, dyspnea, palpitations, presyncope, orthopnea or PND.      The patient denies jaundice, lower extremity edema, abdominal distention, lethargy or confusion.      No history of melena, hematemesis or hematochezia.      The patient denies fevers, sweats or chills.  She had an influenza vaccination earlier this season.      Weight is stable compared to last visit.  Appetite is good.      The patient has not drank alcohol for 2 months.  Prior to this, she would drink 1 alcoholic drink every 2 weeks.       Medical hx Surgical hx   Past Medical History:   Diagnosis Date     Asthma      Depression with anxiety      GERD (gastroesophageal reflux disease)      Hyperlipidemia      Hypertension      Obesity      Thyroid cancer (H) 1996      Past Surgical History:   Procedure Laterality Date     C TOTAL ABDOM HYSTERECTOMY        CHOLECYSTECTOMY       SALPINGO-OOPHORECTOMY BILATERAL       SHOULDER SURGERY       THYROIDECTOMY  1996          Medications  Prior to Admission medications    Medication Sig Start Date End Date Taking? Authorizing Provider   albuterol (PROAIR HFA/PROVENTIL HFA/VENTOLIN HFA) 108 (90 Base) MCG/ACT Inhaler Inhale 2 puffs into the lungs daily    Yes Reported, Patient   amLODIPine (NORVASC) 2.5 MG tablet daily  1/30/19  Yes Reported, Patient   BREO ELLIPTA 200-25 MCG/INH Inhaler INL 1 PUFF PO DAILY. RM AFTER U 6/24/19  Yes Reported, Patient   calcitRIOL (ROCALTROL) 0.25 MCG capsule Take 0.5 mcg by mouth daily  8/15/16  Yes Reported, Patient   calcium carbonate-vitamin D (CALCIUM 600/VITAMIN D) 600-400 MG-UNIT CHEW Take 4 chew tab by mouth 3 times daily  1/13/1996  Yes Reported, Patient   escitalopram (LEXAPRO) 20 MG tablet Take 20 mg by mouth daily 3/2/18  Yes Reported, Patient   labetalol (NORMODYNE) 200 MG tablet Take 150 mg by mouth 2 times daily  7/3/17  Yes Reported, Patient   levothyroxine (SYNTHROID/LEVOTHROID) 200 MCG tablet Take 250 mcg by mouth daily  1/13/1996  Yes Reported, Patient   montelukast (SINGULAIR) 10 MG tablet Take 10 mg by mouth daily 5/1/1995  Yes Reported, Patient   SPIRIVA RESPIMAT 2.5 MCG/ACT inhaler INL 2 PFS PO QD 12/20/19  Yes Reported, Patient   topiramate (TOPAMAX) 25 MG tablet Take 2 tablets by mouth At Bedtime 6/12/18  Yes Reported, Patient   Ferrous Sulfate (IRON) 325 (65 Fe) MG tablet Take 1 tablet by mouth daily 2/10/18   Reported, Patient   lisinopril (PRINIVIL/ZESTRIL) 20 MG tablet Take 20 mg by mouth daily 5/1/01   Reported, Patient   simvastatin (ZOCOR) 20 MG tablet Take 20 mg by mouth daily    Reported, Patient       Allergies  No Known Allergies    Review of systems  A 10-point review of systems was negative    Examination  /60 (BP Location: Right arm, Patient Position: Sitting, Cuff Size: Adult Large)   Pulse 65   Temp 98.6  F (37  C) (Oral)   Wt 95.5 kg (210 lb 9.6  oz)   SpO2 97%   BMI 36.15 kg/m    Body mass index is 36.15 kg/m .    Gen- well, NAD, A+Ox3, normal color  CVS- RRR  RS- CTA  Abd- obese, soft, non-tender, ?increased liver span, no ascites or splenomegaly on palpation or percussion, BS+  Extr- hands normal, no ALEX  Skin- no rash or jaundice  Neuro- no asterixis  Psych- normal mood    Laboratory  Lab Results   Component Value Date     01/06/2020    POTASSIUM 3.4 01/06/2020    CHLORIDE 111 01/06/2020    CO2 26 01/06/2020    BUN 10 01/06/2020    CR 0.66 01/06/2020       Lab Results   Component Value Date    BILITOTAL 1.0 01/06/2020    ALT 30 01/06/2020    AST 33 01/06/2020    ALKPHOS 87 01/06/2020       Lab Results   Component Value Date    ALBUMIN 3.1 01/06/2020    PROTTOTAL 7.5 01/06/2020        Lab Results   Component Value Date    WBC 4.0 01/06/2020    HGB 12.1 01/06/2020    MCV 90 01/06/2020     01/06/2020       Lab Results   Component Value Date    INR 1.35 01/06/2020       Radiology  Not available for review    Assessment  64-year-old female who presents for routine follow-up of abnormal liver function tests of unclear etiology now with radiographic diagnosis of cirrhosis.  Liver function tests remain normal since last clinic visit.  Platelets are lower compared to last visit.  Will obtain a liver biopsy to definitively diagnose/rule out cirrhosis to help determine etiology if present.  The radiographic diagnosis of cirrhosis is puzzling due to the presence of patchy portal fibrosis on liver biopsy <2 years ago.  Congestive hepatopathy can sometimes cause rapidly progressive liver disease but would then expect patient to have more severe cardiac dysfunction in this context.  If cirrhosis is present, the patient will require an upper endoscopy to screen for esophageal varices.     Plan  1.  Obtain OSH records (CT scan, discharge summary)  2.  Liver biopsy  3.  Follow-up in 3 months    Tang Maddox MD  Hepatology  [facility]

## 2020-01-07 LAB — IGG SERPL-MCNC: 2020 MG/DL (ref 610–1616)

## 2020-01-20 ENCOUNTER — ANESTHESIA EVENT (OUTPATIENT)
Dept: SURGERY | Facility: AMBULATORY SURGERY CENTER | Age: 65
End: 2020-01-20

## 2020-01-21 ENCOUNTER — HOSPITAL ENCOUNTER (OUTPATIENT)
Facility: AMBULATORY SURGERY CENTER | Age: 65
End: 2020-01-21
Attending: PHYSICIAN ASSISTANT

## 2020-01-21 ENCOUNTER — ANESTHESIA (OUTPATIENT)
Dept: SURGERY | Facility: AMBULATORY SURGERY CENTER | Age: 65
End: 2020-01-21

## 2020-01-21 ENCOUNTER — ANCILLARY PROCEDURE (OUTPATIENT)
Dept: RADIOLOGY | Facility: AMBULATORY SURGERY CENTER | Age: 65
End: 2020-01-21
Attending: INTERNAL MEDICINE

## 2020-01-21 VITALS
DIASTOLIC BLOOD PRESSURE: 68 MMHG | TEMPERATURE: 98.2 F | OXYGEN SATURATION: 96 % | RESPIRATION RATE: 16 BRPM | WEIGHT: 206 LBS | HEIGHT: 64 IN | SYSTOLIC BLOOD PRESSURE: 122 MMHG | HEART RATE: 64 BPM | BODY MASS INDEX: 35.17 KG/M2

## 2020-01-21 DIAGNOSIS — R79.89 ABNORMAL LIVER FUNCTION TESTS: ICD-10-CM

## 2020-01-21 RX ORDER — LIDOCAINE HYDROCHLORIDE 20 MG/ML
INJECTION, SOLUTION INFILTRATION; PERINEURAL PRN
Status: DISCONTINUED | OUTPATIENT
Start: 2020-01-21 | End: 2020-01-21

## 2020-01-21 RX ORDER — OXYCODONE HYDROCHLORIDE 5 MG/1
5 TABLET ORAL EVERY 4 HOURS PRN
Status: DISCONTINUED | OUTPATIENT
Start: 2020-01-21 | End: 2020-01-22 | Stop reason: HOSPADM

## 2020-01-21 RX ORDER — ONDANSETRON 4 MG/1
4 TABLET, ORALLY DISINTEGRATING ORAL EVERY 30 MIN PRN
Status: DISCONTINUED | OUTPATIENT
Start: 2020-01-21 | End: 2020-01-22 | Stop reason: HOSPADM

## 2020-01-21 RX ORDER — MEPERIDINE HYDROCHLORIDE 25 MG/ML
12.5 INJECTION INTRAMUSCULAR; INTRAVENOUS; SUBCUTANEOUS
Status: DISCONTINUED | OUTPATIENT
Start: 2020-01-21 | End: 2020-01-22 | Stop reason: HOSPADM

## 2020-01-21 RX ORDER — DEXTROSE MONOHYDRATE 25 G/50ML
25-50 INJECTION, SOLUTION INTRAVENOUS
Status: DISCONTINUED | OUTPATIENT
Start: 2020-01-21 | End: 2020-01-22 | Stop reason: HOSPADM

## 2020-01-21 RX ORDER — SODIUM CHLORIDE, SODIUM LACTATE, POTASSIUM CHLORIDE, CALCIUM CHLORIDE 600; 310; 30; 20 MG/100ML; MG/100ML; MG/100ML; MG/100ML
INJECTION, SOLUTION INTRAVENOUS CONTINUOUS PRN
Status: DISCONTINUED | OUTPATIENT
Start: 2020-01-21 | End: 2020-01-21

## 2020-01-21 RX ORDER — PROPOFOL 10 MG/ML
INJECTION, EMULSION INTRAVENOUS CONTINUOUS PRN
Status: DISCONTINUED | OUTPATIENT
Start: 2020-01-21 | End: 2020-01-21

## 2020-01-21 RX ORDER — NICOTINE POLACRILEX 4 MG
15-30 LOZENGE BUCCAL
Status: DISCONTINUED | OUTPATIENT
Start: 2020-01-21 | End: 2020-01-22 | Stop reason: HOSPADM

## 2020-01-21 RX ORDER — ONDANSETRON 2 MG/ML
4 INJECTION INTRAMUSCULAR; INTRAVENOUS EVERY 30 MIN PRN
Status: DISCONTINUED | OUTPATIENT
Start: 2020-01-21 | End: 2020-01-22 | Stop reason: HOSPADM

## 2020-01-21 RX ORDER — FENTANYL CITRATE 50 UG/ML
25-50 INJECTION, SOLUTION INTRAMUSCULAR; INTRAVENOUS
Status: DISCONTINUED | OUTPATIENT
Start: 2020-01-21 | End: 2020-01-22 | Stop reason: HOSPADM

## 2020-01-21 RX ORDER — SODIUM CHLORIDE, SODIUM LACTATE, POTASSIUM CHLORIDE, CALCIUM CHLORIDE 600; 310; 30; 20 MG/100ML; MG/100ML; MG/100ML; MG/100ML
INJECTION, SOLUTION INTRAVENOUS CONTINUOUS
Status: DISCONTINUED | OUTPATIENT
Start: 2020-01-21 | End: 2020-01-22 | Stop reason: HOSPADM

## 2020-01-21 RX ORDER — NALOXONE HYDROCHLORIDE 0.4 MG/ML
.1-.4 INJECTION, SOLUTION INTRAMUSCULAR; INTRAVENOUS; SUBCUTANEOUS
Status: DISCONTINUED | OUTPATIENT
Start: 2020-01-21 | End: 2020-01-22 | Stop reason: HOSPADM

## 2020-01-21 RX ADMIN — PROPOFOL 150 MCG/KG/MIN: 10 INJECTION, EMULSION INTRAVENOUS at 09:02

## 2020-01-21 RX ADMIN — SODIUM CHLORIDE, SODIUM LACTATE, POTASSIUM CHLORIDE, CALCIUM CHLORIDE: 600; 310; 30; 20 INJECTION, SOLUTION INTRAVENOUS at 08:56

## 2020-01-21 RX ADMIN — PROPOFOL: 10 INJECTION, EMULSION INTRAVENOUS at 09:16

## 2020-01-21 RX ADMIN — OXYCODONE HYDROCHLORIDE 5 MG: 5 TABLET ORAL at 10:00

## 2020-01-21 RX ADMIN — LIDOCAINE HYDROCHLORIDE 80 MG: 20 INJECTION, SOLUTION INFILTRATION; PERINEURAL at 09:02

## 2020-01-21 ASSESSMENT — MIFFLIN-ST. JEOR: SCORE: 1459.41

## 2020-01-21 NOTE — DISCHARGE INSTRUCTIONS
[facility]  Home Care Following Anesthesia  For 24 hours after surgery:  1. Get plenty of rest.  A responsible adult must stay with you for at least 24 hours after you leave the surgery center.  2. Do not drive or use heavy equipment.  If you have weakness or tingling, don't drive or use heavy equipment until this feeling goes away.   3. Do not drink alcohol.   4. Avoid strenuous or risky activities.  Ask for help when climbing stairs.  5. You may feel lightheaded.  IF so, sit for a few minutes before standing.  Have someone help you get up.   6. If you have nausea (feel sick to your stomach): Drink only clear liquids such as apple juice, ginger ale, broth or 7-Up.  Rest may also help.  Be sure to drink enough fluids.  Move to a regular diet as you feel able.   7. You may have a slight fever.  Call the doctor if your fever is over 100 F (37.7 C) (taken under the tongue) or lasts longer than 24 hours.  8. You may have a dry mouth, a sore throat, muscle aches or trouble sleeping. These should go away after 24 hours.  9. Do not make important or legal decisions.               Tips for taking pain medications  To get the best pain relief possible, remember these points:    Take pain medications as directed, before pain becomes severe.    Pain medication can upset your stomach: taking it with food may help.    Constipation is a common side effect of pain medication. Drink plenty of  fluids.    Eat foods high in fiber. Take a stool softener if recommended by your doctor or pharmacist.    Do not drink alcohol, drive or operate machinery while taking pain medications.    Ask about other ways to control pain, such as with heat, ice or relaxation.    Tylenol/Acetaminophen Consumption  To help encourage the safe use of acetaminophen, the makers of TYLENOL  have lowered the maximum daily dose for single-ingredient Extra Strength TYLENOL  (acetaminophen) products sold in the [place] from 8  pills per day (4,000 mg) to 6 pills per day (3,000 mg). The dosing interval has also changed from 2 pills every 4-6 hours to 2 pills every 6 hours.    If you feel your pain relief is insufficient, you may take Tylenol/Acetaminophen in addition to your narcotic pain medication.     Be careful not to exceed 3,000 mg of Tylenol/Acetaminophen in a 24 hour period from all sources.    If you are taking extra strength Tylenol/acetaminophen (500 mg), the maximum dose is 6 tablets in 24 hours.    If you are taking regular strength acetaminophen (325 mg), the maximum dose is 9 tablets in 24 hours.    Call a doctor for any of the followin. Signs of infection (fever, growing tenderness at the surgery site, a large amount of drainage or bleeding, severe pain, foul-smelling drainage, redness, swelling).  2. It has been over 8 to 10 hours since surgery and you are still not able to urinate (pass water).  3. Headache for over 24 hours.                                  Discharge Instructions for Liver Biopsy  You had a procedure called liver biopsy. A healthcare provider used a special needle to remove a small piece of tissue from your liver.  A liver biopsy is ordered after other tests have shown that your liver is not working properly. You may also have a liver biopsy when liver disease is suspected.  Home care  Recommendations include the following:     If you had anesthesia, you should not drive until the day after your biopsy.     Remove the bandage covering the biopsy site 48 hours after the procedure.    Bedrest for 4 hours immediately after the procedure.    Don t shower for 48 hours after the biopsy. If you wish, you may wash yourself with a sponge or washcloth. When you are able to shower, don t scrub the site. Gently wash the area and pat it dry.    Don t lift anything heavier than 10 pounds for 3 days after the procedure, or as advised by your healthcare provider.    Don't do strenuous activities or exercises after  "the procedure.    Ask your healthcare provider when you can return to work.    Do not start taking blood thinners without clear instructions from your healthcare provider.  Follow-up care  Make a follow-up appointment as directed by our staff.     When to call your healthcare provider  Call your healthcare provider immediately if you have any of the following:    Bleeding from the biopsy site    Dizziness or lightheadedness    Sudden or increased shortness of breath    Sudden chest pain    Fever of 100.4 F (38.0 C) or higher, or as directed by your healthcare provider    Shaking chills    Increasing redness, tenderness, or swelling at the biopsy site    Drainage from the biopsy site    Opening of the biopsy site    Increasing pain, with or without activity, in the liver or belly area, or pain shooting to the right shoulder     Additional Instructions:    Please call our IR service for the following problems:       - If the skin around the biopsy sight is swollen, reddened, painful, or has any discharge.  - If you have persistent pain in biopsy sight.  - If you have a fever of greater than 100.5  F and chills.  - If you feel nauseated and \"just not right.\"      [facility]  Interventional Radiology (IR)  [address]  [address] [facility] [place] [address]    Contact Number:  [phone]  (IR control desk)  - Monday - Friday 8:00 am - 4:30 pm    After hours for urgent concerns:  [phone]  - After 4:30 pm Monday - Friday, Weekends and Holidays.   - Ask for Interventional Radiology on-call.  Someone is available 24 hours a day.  - [facility] toll free number:  [phone]               "

## 2020-01-21 NOTE — ANESTHESIA POSTPROCEDURE EVALUATION
Anesthesia POST Procedure Evaluation    Patient: Geri Zamorano   MRN:     [id number] Gender:   female   Age:    64 year old :      1955        Preoperative Diagnosis: Abnormal liver function test [R94.5]   Procedure(s):  Liver Biopsy @9:00   Postop Comments: No value filed.       Anesthesia Type:  Not documented  No value filed.    Reportable Event: NO     PAIN: Uncomplicated   Sign Out status: Comfortable, Well controlled pain     PONV: No PONV   Sign Out status:  No Nausea or Vomiting     Neuro/Psych: Uneventful perioperative course   Sign Out Status: Preoperative baseline; Age appropriate mentation     Airway/Resp.: Uneventful perioperative course   Sign Out Status: Non labored breathing, age appropriate RR; Resp. Status within EXPECTED Parameters     CV: Uneventful perioperative course   Sign Out status: Appropriate BP and perfusion indices; Appropriate HR/Rhythm     Disposition:   Sign Out in:  PACU  Disposition:  Phase II; Home  Recovery Course: Uneventful  Follow-Up: Not required           Last Anesthesia Record Vitals:  CRNA VITALS  2020 0906 - 2020 1006      2020             NIBP:  125/62    Pulse:  68    NIBP Mean:  87    SpO2:  94 %    Resp Rate (set):  10          Last PACU Vitals:  Vitals Value Taken Time   /61 2020  9:39 AM   Temp 36.3  C (97.4  F) 2020  9:39 AM   Pulse     Resp 16 2020  9:39 AM   SpO2 94 % 2020  9:39 AM   Temp src Available 2020  9:30 AM   NIBP 125/62 2020  9:36 AM   Pulse 68 2020  9:36 AM   SpO2 94 % 2020  9:36 AM   Resp     Temp     Ht Rate 138 2020  9:34 AM   Temp 2           Electronically Signed By: Brown Echeverria MD, MD, 2020, 12:52 PM

## 2020-01-21 NOTE — ANESTHESIA CARE TRANSFER NOTE
Patient: Geri Zamorano    Procedure(s):  Liver Biopsy @9:00    Diagnosis: Abnormal liver function test [R94.5]  Diagnosis Additional Information: No value filed.    Anesthesia Type:   No value filed.     Note:  Airway :Room Air  Patient transferred to:Phase II  Handoff Report: Identifed the Patient, Identified the Reponsible Provider, Reviewed the pertinent medical history, Discussed the surgical course, Reviewed Intra-OP anesthesia mangement and issues during anesthesia, Set expectations for post-procedure period and Allowed opportunity for questions and acknowledgement of understanding      Vitals: (Last set prior to Anesthesia Care Transfer)    CRNA VITALS  1/21/2020 0906 - 1/21/2020 0941      1/21/2020             NIBP:  125/62    Pulse:  68    NIBP Mean:  87    SpO2:  94 %    Resp Rate (set):  10                Electronically Signed By: CINDY Post CRNA  January 21, 2020  9:41 AM

## 2020-01-21 NOTE — BRIEF OP NOTE
Interventional Radiology Brief Post Procedure Note    Procedure:  Liver biopsy.    Proced[facility]    Assistant: MANJULA Moon    Time Out: Prior to the start of the procedure and with procedural staff participation, I verbally confirmed the patient s identity using two indicators, relevant allergies, that the procedure was appropriate and matched the consent or emergent situation, and that the correct equipment/implants were available. Immediately prior to starting the procedure I conducted the Time Out with the procedural staff and re-confirmed the patient s name, procedure, and site/side. (The Joint Commission universal protocol was followed.)  Yes        Sedation: Monitored Anesthesia Care (MAC) administered and documented by Anesthesia Care Provider    Findings: U/S guided random liver biopsy performed using an intercostal approach. Two 18 gauge cores taken.    Estimated Blood Loss: Minimal    Fluoroscopy Time:  None.    SPECIMENS: Core needle biopsy specimens sent for pathological analysis    Complications: 1. None     Condition: Stable    Plan: Follow up per primary team. Bedrest for 4 hours, no strenuous activity for 3 days.    Comments: See dictated procedure note for full details.    Emanuel Archer PA-C

## 2020-01-21 NOTE — ANESTHESIA PREPROCEDURE EVALUATION
Anesthesia Pre-Procedure Evaluation    Patient: Geri Zamorano   MRN:     [id number] Gender:   female   Age:    64 year old :      1955        Preoperative Diagnosis: Abnormal liver function test [R94.5]   Procedure(s):  Liver Biopsy @9:00     Past Medical History:   Diagnosis Date     Asthma      Depression with anxiety      GERD (gastroesophageal reflux disease)      Hyperlipidemia      Hypertension      Obesity      Thyroid cancer (H)       Past Surgical History:   Procedure Laterality Date     C TOTAL ABDOM HYSTERECTOMY       CHOLECYSTECTOMY       SALPINGO-OOPHORECTOMY BILATERAL       SHOULDER SURGERY       THYROIDECTOMY            Anesthesia Evaluation     . Pt has had prior anesthetic. Type: General    No history of anesthetic complications          ROS/MED HX    ENT/Pulmonary:     (+)Intermittent asthma , . .    Neurologic:  - neg neurologic ROS     Cardiovascular:     (+) hypertension----. : . . . :. .       METS/Exercise Tolerance:  4 - Raking leaves, gardening   Hematologic:         Musculoskeletal:  - neg musculoskeletal ROS       GI/Hepatic:     (+) GERD       Renal/Genitourinary:  - ROS Renal section negative       Endo:     (+) thyroid problem hypothyroidism, Obesity, .      Psychiatric:  - neg psychiatric ROS       Infectious Disease:  - neg infectious disease ROS       Malignancy:      - no malignancy   Other:                         PHYSICAL EXAM:   Mental Status/Neuro: A/A/O   Airway: Facies: Feasible  Mallampati: I  Mouth/Opening: Full  TM distance: > 6 cm  Neck ROM: Full   Respiratory: Auscultation: CTAB     Resp. Rate: Normal     Resp. Effort: Normal      CV: Rhythm: Regular  Rate: Age appropriate  Heart: Normal Sounds  Edema: None   Comments:      Dental: Normal Dentition                LABS:  CBC:   Lab Results   Component Value Date    WBC 4.0 2020    WBC 5.6 2019    HGB 12.1 2020    HGB 14.0 2019    HCT 38.0 2020    HCT 43.1 2019    PLT  "102 (L) 01/06/2020     (L) 02/04/2019     BMP:   Lab Results   Component Value Date     01/06/2020     02/04/2019    POTASSIUM 3.4 01/06/2020    POTASSIUM 3.6 02/04/2019    CHLORIDE 111 (H) 01/06/2020    CHLORIDE 106 02/04/2019    CO2 26 01/06/2020    CO2 28 02/04/2019    BUN 10 01/06/2020    BUN 8 02/04/2019    CR 0.66 01/06/2020    CR 0.67 02/04/2019     (H) 01/06/2020     (H) 02/04/2019     COAGS:   Lab Results   Component Value Date    INR 1.35 (H) 01/06/2020     POC: No results found for: BGM, HCG, HCGS  OTHER:   Lab Results   Component Value Date    SHAYAN 8.0 (L) 01/06/2020    ALBUMIN 3.1 (L) 01/06/2020    PROTTOTAL 7.5 01/06/2020    ALT 30 01/06/2020    AST 33 01/06/2020    ALKPHOS 87 01/06/2020    BILITOTAL 1.0 01/06/2020        Preop Vitals    BP Readings from Last 3 Encounters:   01/21/20 (!) 152/80   01/06/20 129/60   02/04/19 120/72    Pulse Readings from Last 3 Encounters:   01/21/20 62   01/06/20 65   02/04/19 71      Resp Readings from Last 3 Encounters:   01/21/20 16   04/17/18 16    SpO2 Readings from Last 3 Encounters:   01/21/20 98%   01/06/20 97%   02/04/19 100%      Temp Readings from Last 1 Encounters:   01/21/20 37.2  C (98.9  F) (Tympanic)    Ht Readings from Last 1 Encounters:   01/21/20 1.626 m (5' 4\")      Wt Readings from Last 1 Encounters:   01/21/20 93.4 kg (206 lb)    Estimated body mass index is 35.36 kg/m  as calculated from the following:    Height as of this encounter: 1.626 m (5' 4\").    Weight as of this encounter: 93.4 kg (206 lb).     LDA:  Peripheral IV 01/21/20 Left Hand (Active)   Site Assessment WDL 1/21/2020  8:27 AM   Line Status Infusing 1/21/2020  8:27 AM   Phlebitis Scale 0-->no symptoms 1/21/2020  8:27 AM   Infiltration Scale 0 1/21/2020  8:27 AM   Extravasation? No 1/21/2020  8:27 AM   Dressing Intervention New dressing  1/21/2020  8:27 AM   Reason Not Rotated Anticipated discharge 1/21/2020  8:27 AM   Number of days: 0    "     Assessment:   ASA SCORE: 2    H&P: History and physical reviewed and following examination; no interval change.   Smoking Status:  Non-Smoker/Unknown   NPO Status: NPO Appropriate     Plan:   Anes. Type:  MAC   Pre-Medication: None   Induction:  N/a   Airway: Native Airway   Access/Monitoring: PIV   Maintenance: N/a     Postop Plan:   Postop Pain: None  Postop Sedation/Airway: Not planned  Disposition: Outpatient     PONV Management:   Adult Risk Factors: Female, Non-Smoker   Prevention: Ondansetron, Dexamethasone     CONSENT: Direct conversation   Plan and risks discussed with: Patient                      Brown Echeverria MD, MD

## 2020-01-22 LAB — COPATH REPORT: NORMAL

## 2020-01-27 ENCOUNTER — TELEPHONE (OUTPATIENT)
Dept: GASTROENTEROLOGY | Facility: CLINIC | Age: 65
End: 2020-01-27

## 2020-01-27 DIAGNOSIS — K74.60 CIRRHOSIS OF LIVER WITHOUT ASCITES, UNSPECIFIED HEPATIC CIRRHOSIS TYPE (H): Primary | ICD-10-CM

## 2020-01-27 NOTE — TELEPHONE ENCOUNTER
Called patient to review liver biopsy results.    Liver biopsy 1/21/2020 consistent with cirrhosis most likely related to NAFLD.    Will review at liver pathology conference for clarification of etiology in context of prior liver biopsy.    Will need upper endoscopy to screen for esophageal varices.    Tang Maddox MD  Hepatology  [facility]

## 2020-02-19 ENCOUNTER — TELEPHONE (OUTPATIENT)
Dept: GASTROENTEROLOGY | Facility: CLINIC | Age: 65
End: 2020-02-19

## 2020-02-19 NOTE — TELEPHONE ENCOUNTER
Patient scheduled for EGD    Indication for procedure. Cirrhosis of liver without ascites, unspecified hepatic cirrhosis type (H)    Referring Provider. Tang Holloway MD    ? No     Arrival time verified? 12:15 PM    Facility location verified? [address] [place] [address]    Instructions given regarding prep and procedure Instructions reviewed    Prep Type NPO    Are you taking any anticoagulants or blood thinners? No     Instructions given? N/a     Electronic implanted devices? Denies     Pre procedure teaching completed? Yes    Transportation from procedure?  policy reviewed. Instructed patient to have someone stay with her for 6 hours pot exam    H&P / Pre op physical completed? N/a

## 2020-02-25 DIAGNOSIS — K74.60 CIRRHOSIS OF LIVER WITHOUT ASCITES, UNSPECIFIED HEPATIC CIRRHOSIS TYPE (H): Primary | ICD-10-CM

## 2020-02-26 ENCOUNTER — HOSPITAL ENCOUNTER (OUTPATIENT)
Facility: AMBULATORY SURGERY CENTER | Age: 65
End: 2020-02-26
Attending: INTERNAL MEDICINE

## 2020-02-26 VITALS
TEMPERATURE: 97.8 F | HEART RATE: 54 BPM | OXYGEN SATURATION: 95 % | RESPIRATION RATE: 12 BRPM | SYSTOLIC BLOOD PRESSURE: 112 MMHG | DIASTOLIC BLOOD PRESSURE: 55 MMHG

## 2020-02-26 DIAGNOSIS — K25.9 GASTRIC ULCER WITHOUT HEMORRHAGE OR PERFORATION, UNSPECIFIED CHRONICITY: Primary | ICD-10-CM

## 2020-02-26 DIAGNOSIS — K74.60 HEPATIC CIRRHOSIS, UNSPECIFIED HEPATIC CIRRHOSIS TYPE, UNSPECIFIED WHETHER ASCITES PRESENT (H): Primary | ICD-10-CM

## 2020-02-26 LAB — UPPER GI ENDOSCOPY: NORMAL

## 2020-02-26 RX ORDER — LIDOCAINE 40 MG/G
CREAM TOPICAL
Status: DISCONTINUED | OUTPATIENT
Start: 2020-02-26 | End: 2020-02-27 | Stop reason: HOSPADM

## 2020-02-26 RX ORDER — FENTANYL CITRATE 50 UG/ML
INJECTION, SOLUTION INTRAMUSCULAR; INTRAVENOUS PRN
Status: DISCONTINUED | OUTPATIENT
Start: 2020-02-26 | End: 2020-02-26 | Stop reason: HOSPADM

## 2020-02-26 RX ORDER — PANTOPRAZOLE SODIUM 20 MG/1
20 TABLET, DELAYED RELEASE ORAL DAILY
Qty: 30 TABLET | Refills: 2 | Status: SHIPPED | OUTPATIENT
Start: 2020-02-26 | End: 2021-06-16 | Stop reason: ALTCHOICE

## 2020-02-26 RX ORDER — PREDNISONE 5 MG/1
20 TABLET ORAL DAILY
COMMUNITY
End: 2020-10-07 | Stop reason: ALTCHOICE

## 2020-02-26 RX ORDER — ONDANSETRON 2 MG/ML
4 INJECTION INTRAMUSCULAR; INTRAVENOUS
Status: DISCONTINUED | OUTPATIENT
Start: 2020-02-26 | End: 2020-02-27 | Stop reason: HOSPADM

## 2020-02-26 NOTE — DISCHARGE INSTRUCTIONS
Discharge Instructions after  Upper Endoscopy (EGD)    Activity and Diet  You were given medicine for pain. You may be dizzy or sleepy.  For 24 hours:    Do not drive or use heavy equipment.    Do not make important decisions.    Do not drink any alcohol.  ___ You may return to your regular diet.    Discomfort  You may have a sore throat for 2 to 3 days. It may help to:    Avoid hot liquids for 24 hours.    Use sore throat lozenges.    Gargle as needed with salt water up to 4 times a day. Mix 1 cup of warm water  with 1 teaspoon of salt. Do not swallow.  ___ Your esophagus was dilated (opened) or banded during the exam:    Drink only cool liquids for the rest of the day. Eat a soft diet for the next few days.    You may have a sore chest for 2 to 3 days.    You may take Tylenol (acetaminophen) for pain unless your doctor has told you not to.    Do not take aspirin or ibuprofen (Advil, Motrin) or other NSAIDS  (anti-inflammatory drugs) for ___ days.    Follow-up  ___ We took small tissue samples for study. If you do not have a follow-up visit scheduled,  call your provider s office in 2 weeks for the results.    Other instructions________________________________________________________    When to call us:  Problems are rare. Call right away if you have:    Unusual throat pain or trouble swallowing    Unusual pain in belly or chest that is not relieved by belching or passing air    Black stools (tar-like looking bowel movement)    Temperature above 100.6  F. (37.5  C).    If you vomit blood or have severe pain, go to an emergency room.    If you have questions, call:  Monday to Friday, 7 a.m. to 4:30 p.m.: Endoscopy: [phone] (We may have to call you back)    After hours: Hospital: [phone] (Ask for the GI fellow on call)

## 2020-02-27 LAB — COPATH REPORT: NORMAL

## 2020-03-11 ENCOUNTER — HEALTH MAINTENANCE LETTER (OUTPATIENT)
Age: 65
End: 2020-03-11

## 2020-03-25 ENCOUNTER — DOCUMENTATION ONLY (OUTPATIENT)
Dept: CARE COORDINATION | Facility: CLINIC | Age: 65
End: 2020-03-25

## 2020-04-06 ENCOUNTER — VIRTUAL VISIT (OUTPATIENT)
Dept: GASTROENTEROLOGY | Facility: CLINIC | Age: 65
End: 2020-04-06
Attending: INTERNAL MEDICINE
Payer: COMMERCIAL

## 2020-04-06 DIAGNOSIS — K74.60 LIVER CIRRHOSIS SECONDARY TO NASH (H): Primary | ICD-10-CM

## 2020-04-06 DIAGNOSIS — K75.81 LIVER CIRRHOSIS SECONDARY TO NASH (H): Primary | ICD-10-CM

## 2020-04-06 DIAGNOSIS — M32.12 OTHER SYSTEMIC LUPUS ERYTHEMATOSUS WITH PERICARDITIS (H): ICD-10-CM

## 2020-04-06 RX ORDER — SULFAMETHOXAZOLE/TRIMETHOPRIM 800-160 MG
1 TABLET ORAL
COMMUNITY
Start: 2020-02-05 | End: 2021-06-16

## 2020-04-06 RX ORDER — OMEPRAZOLE 40 MG/1
40 CAPSULE, DELAYED RELEASE ORAL DAILY
COMMUNITY
Start: 2020-01-10 | End: 2020-10-07 | Stop reason: ALTCHOICE

## 2020-04-06 RX ORDER — COLCHICINE 0.6 MG/1
0.6 CAPSULE ORAL 2 TIMES DAILY
COMMUNITY
Start: 2020-01-17

## 2020-04-06 RX ORDER — HYDROXYCHLOROQUINE SULFATE 200 MG/1
200 TABLET, FILM COATED ORAL 2 TIMES DAILY
COMMUNITY
Start: 2020-01-31

## 2020-04-06 RX ORDER — AZATHIOPRINE 50 MG/1
1 TABLET ORAL DAILY
COMMUNITY
Start: 2020-02-10

## 2020-04-06 RX ORDER — PREDNISONE 20 MG/1
30 TABLET ORAL DAILY
COMMUNITY
Start: 2020-01-31 | End: 2021-06-16

## 2020-04-06 ASSESSMENT — PAIN SCALES - GENERAL: PAINLEVEL: MODERATE PAIN (5)

## 2020-04-06 NOTE — PROGRESS NOTES
"Geri Zamorano is a 64 year old female who is being evaluated via a billable telephone visit.      The patient has been notified of following:     \"This telephone visit will be conducted via a call between you and your physician/provider. We have found that certain health care needs can be provided without the need for a physical exam.  This service lets us provide the care you need with a short phone conversation.  If a prescription is necessary we can send it directly to your pharmacy.  If lab work is needed we can place an order for that and you can then stop by our lab to have the test done at a later time.    If during the course of the call the physician/provider feels a telephone visit is not appropriate, you will not be charged for this service.\"     Patient has given verbal consent for Telephone visit?  Yes      Geri Zamorano complains of cirrhosis.    I have reviewed and updated the patient's Past Medical History, Social History, Family History and Medication List.    ALLERGIES  Dairy digestive    Additional provider notes:   Last clinic visit Jan 2020.  Liver bx Jan 2020 showed cirrhosis with NAFLD.  EGD Feb 2020 showed small esophageal varices and ulcer.  Seen by local rheumatologist, diagnosed with SLE and started on azathioprine and hydroxychloroquine.    Patient is well today.  No new symptoms.    Patient denies jaundice, ALEX, abdominal distension, confusion, melena, hematemesis or hematochezia.    No fevers, sweats or chills.  No cough or dyspnea.    No recent ETOH.  Stopped work 2 weeks ago due to new immunosuppression.    Labs 2/28/20 reviewed.    Assessment  64 year old female with recently diagnosed compensated BRITO cirrhosis and recently diagnosed SLE.  No new symptoms.  Up to date with HCC screening.  Up to date with surveillance of esophageal varices.    Plan  1.  U/S July 2020  2.  Low Na diet  3.  Follow-up in 6 months    Phone call duration: 30 minutes    Tang Maddox, " MD  Hepatology  [facility]

## 2020-09-14 ENCOUNTER — TELEPHONE (OUTPATIENT)
Dept: GASTROENTEROLOGY | Facility: CLINIC | Age: 65
End: 2020-09-14

## 2020-09-14 NOTE — TELEPHONE ENCOUNTER
[facility] Call Center    Phone Message    May a detailed message be left on voicemail: yes     Reason for Call: Order(s): Other:   Reason for requested: Pt would like her lab orders to be sent to the [facility] in [place].  Date needed: asap  Provider name: Dr. Maddox      Action Taken: Message routed to:  [facility] ([facility]): hep    Travel Screening: Not Applicable

## 2020-09-14 NOTE — TELEPHONE ENCOUNTER
Lab orders faxed to [facility] per pt's request (fax: [phone]). Pt is overdue for HCC screening and provided pt with the scheduling number for [facility] imaging per pt request. Pt plans to get labs and ultrasound completed prior to appointment with Dr. Maddox on 9/29.

## 2020-09-16 ENCOUNTER — EXTERNAL ORDER RESULTS (OUTPATIENT)
Dept: GASTROENTEROLOGY | Facility: CLINIC | Age: 65
End: 2020-09-16

## 2020-09-16 LAB
ALBUMIN SERPL-MCNC: 3.8 G/DL (ref 3.5–5)
ALP SERPL-CCNC: 70 U/L (ref 38–126)
ALT SERPL-CCNC: 24 U/L (ref 9–52)
AST SERPL-CCNC: 33 U/L (ref 17–45)
BILIRUB SERPL-MCNC: 0.8 MG/DL (ref 0.2–1.3)
BUN SERPL-MCNC: 11 MG/DL (ref 6–21)
CALCIUM SERPL-MCNC: 8.6 MG/DL (ref 8.3–10.9)
CHLORIDE SERPLBLD-SCNC: 102 MMOL/L (ref 98–107)
CO2 SERPL-SCNC: 33 MMOL/L (ref 22–32)
CREAT SERPL-MCNC: 0.7 MG/DL (ref 0.7–1.5)
ERYTHROCYTE [DISTWIDTH] IN BLOOD BY AUTOMATED COUNT: 15 % (ref 11.5–14.5)
GFR SERPL CREATININE-BSD FRML MDRD: 90 ML/MIN/1.73M2 (ref 60–128)
GLUCOSE SERPL-MCNC: 207 MG/DL (ref 70–99)
HCT VFR BLD AUTO: 39 % (ref 37–47)
HEMOGLOBIN: 13.2 G/DL (ref 11.7–15.7)
MCH RBC QN AUTO: 29.5 PG (ref 27–31)
MCHC RBC AUTO-ENTMCNC: 33.8 G/DL (ref 33–37)
MCV RBC AUTO: 87 FL (ref 81–99)
PLATELET # BLD AUTO: 114 10^9/L (ref 130–400)
POTASSIUM SERPL-SCNC: 4.1 MMOL/L (ref 3.5–5.5)
PROT SERPL-MCNC: 6.1 G/DL (ref 6.4–8.3)
RBC # BLD AUTO: 4.47 10^12/L (ref 4.2–6.1)
SODIUM SERPL-SCNC: 143 MMOL/L (ref 136–148)
WBC # BLD AUTO: 5.3 10^9/L (ref 4.8–10.8)

## 2020-09-21 ENCOUNTER — HOSPITAL ENCOUNTER (OUTPATIENT)
Dept: ULTRASOUND IMAGING | Facility: CLINIC | Age: 65
Discharge: HOME | End: 2020-09-21
Attending: INTERNAL MEDICINE | Admitting: INTERNAL MEDICINE
Payer: COMMERCIAL

## 2020-09-21 DIAGNOSIS — K74.60 LIVER CIRRHOSIS SECONDARY TO NASH (H): ICD-10-CM

## 2020-09-21 DIAGNOSIS — K75.81 LIVER CIRRHOSIS SECONDARY TO NASH (H): ICD-10-CM

## 2020-09-21 PROCEDURE — 76700 US EXAM ABDOM COMPLETE: CPT

## 2020-09-27 ENCOUNTER — TRANSFERRED RECORDS (OUTPATIENT)
Dept: HEALTH INFORMATION MANAGEMENT | Facility: CLINIC | Age: 65
End: 2020-09-27

## 2020-09-30 ENCOUNTER — TRANSFERRED RECORDS (OUTPATIENT)
Dept: HEALTH INFORMATION MANAGEMENT | Facility: CLINIC | Age: 65
End: 2020-09-30

## 2020-10-03 ENCOUNTER — TRANSFERRED RECORDS (OUTPATIENT)
Dept: HEALTH INFORMATION MANAGEMENT | Facility: CLINIC | Age: 65
End: 2020-10-03

## 2020-10-04 ENCOUNTER — TRANSFERRED RECORDS (OUTPATIENT)
Dept: HEALTH INFORMATION MANAGEMENT | Facility: CLINIC | Age: 65
End: 2020-10-04

## 2020-10-07 ENCOUNTER — VIRTUAL VISIT (OUTPATIENT)
Dept: GASTROENTEROLOGY | Facility: CLINIC | Age: 65
End: 2020-10-07
Attending: INTERNAL MEDICINE
Payer: COMMERCIAL

## 2020-10-07 DIAGNOSIS — K74.60 LIVER CIRRHOSIS SECONDARY TO NASH (H): Primary | ICD-10-CM

## 2020-10-07 DIAGNOSIS — K75.81 LIVER CIRRHOSIS SECONDARY TO NASH (H): Primary | ICD-10-CM

## 2020-10-07 PROCEDURE — 99214 OFFICE O/P EST MOD 30 MIN: CPT | Mod: 25 | Performed by: INTERNAL MEDICINE

## 2020-10-07 PROCEDURE — 99358 PROLONG SERVICE W/O CONTACT: CPT | Mod: 25 | Performed by: INTERNAL MEDICINE

## 2020-10-07 RX ORDER — ATORVASTATIN CALCIUM 20 MG/1
20 TABLET, FILM COATED ORAL DAILY
Status: ON HOLD | COMMUNITY
End: 2022-07-14

## 2020-10-07 RX ORDER — TORSEMIDE 20 MG/1
20 TABLET ORAL DAILY PRN
COMMUNITY
Start: 2020-10-04 | End: 2024-09-11

## 2020-10-07 NOTE — LETTER
"    10/7/2020         RE: Geri Zamorano  [address]  [place] [address]        Dear Colleague,    Thank you for referring your patient, Geri Zamorano, to the [facility] [place]. Please see a copy of my visit note below.    Geri Zamorano is a 65 year old female who is being evaluated via a billable telephone visit.      The patient has been notified of following:     \"This telephone visit will be conducted via a call between you and your physician/provider. We have found that certain health care needs can be provided without the need for a physical exam.  This service lets us provide the care you need with a short phone conversation.  If a prescription is necessary we can send it directly to your pharmacy.  If lab work is needed we can place an order for that and you can then stop by our lab to have the test done at a later time.    Telephone visits are billed at different rates depending on your insurance coverage. During this emergency period, for some insurers they may be billed the same as an in-person visit.  Please reach out to your insurance provider with any questions.    If during the course of the call the physician/provider feels a telephone visit is not appropriate, you will not be charged for this service.\"    Patient has given verbal consent for Telephone visit?  Yes    What phone number would you like to be contacted at? [phone]    How would you like to obtain your AVS? Veronique    Phone call duration: 23 minutes  _________________________________________________________________________    [facility]    Hepatology follow-up    Follow-up visit for cirrhosis    Subjective:  65 year old female    Cirrhosis  - dx Jan 2020  - cryptogenic/BRITO  - liver bx 1/21/2020  - no hx ascites, HE or variceal bleed  - last EGD Feb 2020- diminutive EV  - HCC screening- abd U/S Sep 2020    Last visit 04/2020.  The patient was recently admitted to " [facility] for acute on chronic diastolic heart failure after initially presenting with dyspnea.  She responded well to IV diuresis.  Patient was noted to have recurrent pericardial effusion but no tamponade on echocardiogram and ECG.  Left heart cath showed severe aortic stenosis (GIOVANNA 0.77) and right heart cath was consistent with pulmonary hypertension (mean PA pressure ~37mmHg).  The patient was also noted to have pulmonary fibrosis on CT and idiopathic pulmonary fibrosis/interstitial lung disease is now being considered.      The patient has been switched from simvastatin to atorvastatin.  Her labetalol dose has also been increased and she has started torsemide.  She has a visit with the cardiology valve clinic scheduled at the end of month to discuss possible interventional approaches.      The patient is feeling a little run down but better compared to when she was admitted to the hospital.  Her dyspnea has resolved.  She denies any chest pain or palpitations.  She does report some mild lightheadedness which may be related to diuretics.        The patient denies any jaundice, itch, lower extremity edema (this was not an issue prior to admission), lethargy or confusion.      The patient denies melena, hematemesis or hematochezia.      The patient denies fevers, sweats or chills.  No cough.      Weight has been stable since discharge from hospital.  If anything, patient's weight is down a couple pounds since Sunday.  Appetite is good.      The patient does not drink alcohol.  She continues to practice social distancing and wears a mask whenever she is out in public.       Medical hx Surgical hx   Past Medical History:   Diagnosis Date     Aortic stenosis      Asthma      Depression with anxiety      GERD (gastroesophageal reflux disease)      Hyperlipidemia      Hypertension      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) 01/2020     Obesity      SLE (systemic lupus erythematosus) (H) 02/2020      Thyroid cancer (H) 1996      Past Surgical History:   Procedure Laterality Date     C TOTAL ABDOM HYSTERECTOMY       CHOLECYSTECTOMY       ESOPHAGOSCOPY, GASTROSCOPY, DUODENOSCOPY (EGD), COMBINED N/A 2/26/2020    Procedure: ESOPHAGOGASTRODUODENOSCOPY, WITH BIOPSY;  Surgeon: Leventhal, Thomas Michael, MD;  [facility]: [facility]     IR LIVER BIOPSY PERCUTANEOUS  1/21/2020     PERCUTANEOUS BIOPSY LIVER Right 1/21/2020    Procedure: Liver Biopsy @9:00;  Surgeon: Emanuel Archer PA-C;  Location: [facility] OR     SALPINGO-OOPHORECTOMY BILATERAL       SHOULDER SURGERY       THYROIDECTOMY  1996          Medications  Prior to Admission medications    Medication Sig Start Date End Date Taking? Authorizing Provider   albuterol (PROAIR HFA/PROVENTIL HFA/VENTOLIN HFA) 108 (90 Base) MCG/ACT Inhaler Inhale 2 puffs into the lungs daily    Yes Reported, Patient   amLODIPine (NORVASC) 2.5 MG tablet daily  1/30/19  Yes Reported, Patient   atorvastatin (LIPITOR) 20 MG tablet Take 20 mg by mouth daily   Yes Reported, Patient   azaTHIOprine (IMURAN) 50 MG tablet Take 1 tablet by mouth daily 2/10/20  Yes Reported, Patient   BREO ELLIPTA 200-25 MCG/INH Inhaler INL 1 PUFF PO DAILY. RM AFTER U 6/24/19  Yes Reported, Patient   calcitRIOL (ROCALTROL) 0.25 MCG capsule Take 0.5 mcg by mouth daily  8/15/16  Yes Reported, Patient   calcium carbonate-vitamin D (CALCIUM 600/VITAMIN D) 600-400 MG-UNIT CHEW Take 4 chew tab by mouth 3 times daily  1/13/1996  Yes Reported, Patient   colchicine (MITIGARE) 0.6 MG capsule Take 0.6 mg by mouth 2 times daily 1/17/20  Yes Reported, Patient   escitalopram (LEXAPRO) 20 MG tablet Take 20 mg by mouth daily 3/2/18  Yes Reported, Patient   Ferrous Sulfate (IRON) 325 (65 Fe) MG tablet Take 1 tablet by mouth daily  2/10/18  Yes Reported, Patient   hydroxychloroquine (PLAQUENIL) 200 MG tablet Take 200 mg by mouth 2 times daily 1/31/20  Yes Reported, Patient   labetalol (NORMODYNE) 200 MG tablet Take 200 mg by mouth 2 times  daily  7/3/17  Yes Reported, Patient   levothyroxine (SYNTHROID/LEVOTHROID) 200 MCG tablet Take 250 mcg by mouth daily  1/13/1996  Yes Reported, Patient   lisinopril (PRINIVIL/ZESTRIL) 20 MG tablet Take 20 mg by mouth daily 5/1/01  Yes Reported, Patient   montelukast (SINGULAIR) 10 MG tablet Take 10 mg by mouth daily 5/1/1995  Yes Reported, Patient   pantoprazole (PROTONIX) 20 MG EC tablet Take 1 tablet (20 mg) by mouth daily 2/26/20  Yes Leventhal, Thomas Michael, MD   predniSONE (DELTASONE) 20 MG tablet Take 30 mg by mouth daily 1/31/20  Yes Reported, Patient   SPIRIVA RESPIMAT 2.5 MCG/ACT inhaler INL 2 PFS PO QD 12/20/19  Yes Reported, Patient   sulfamethoxazole-trimethoprim (BACTRIM DS) 800-160 MG tablet Take 1 tablet by mouth every 48 hours 2/5/20  Yes Reported, Patient   topiramate (TOPAMAX) 25 MG tablet Take 2 tablets by mouth At Bedtime 6/12/18  Yes Reported, Patient   torsemide (DEMADEX) 20 MG tablet Take 20 mg by mouth daily 10/4/20  Yes Reported, Patient       Allergies  Allergies   Allergen Reactions     Dairy Digestive Diarrhea       Review of systems  A 10-point review of systems was negative    Examination  Nil    Laboratory  Lab Results   Component Value Date     09/14/2020    POTASSIUM 4.1 09/14/2020    CHLORIDE 102 09/14/2020    CO2 33 09/14/2020    BUN 11 09/14/2020    CR 0.7 09/14/2020       Lab Results   Component Value Date    BILITOTAL 0.8 09/14/2020    ALT 24 09/14/2020    AST 33 09/14/2020    ALKPHOS 70 09/14/2020       Lab Results   Component Value Date    ALBUMIN 3.8 09/14/2020    PROTTOTAL 6.1 09/14/2020        Lab Results   Component Value Date    WBC 5.3 09/14/2020    HGB 13.2 09/14/2020    MCV 87 09/14/2020     09/14/2020       Lab Results   Component Value Date    INR 1.35 01/06/2020       Radiology  Abd U/S Sep 2020 reviewed- no mass or ascites    Assessment  65-year-old female who presents for routine follow-up of compensated cryptogenic/BRITO cirrhosis.  MELD-Na= 8  (stable).  Child-Jana-Kwan class A (score= 5).  No evidence of ascites or hepatic encephalopathy.  Holly-pulmonary hypertension should be considered but would be less likely due to improved PA pressure following diuresis and other etiologies of pulmonary hypertension (SLE, ?ILD, left heart disease/aortic stenosis).  The patient's liver function is adequate to proceed with cardiac surgery if indicated.  Up-to-date with surveillance of esophageal varices.  Up-to-date with screening for HCC.     Plan  1.  Low Na diet  2.  Diuretics per cardiology  3.  Continue atorvastatin  4.  OK to proceed with cardiac surgery if indicated  5.  Follow-up in 6 months    Tang Maddox MD  Hepatology  [facility]    Approximately 35 minutes of non face-to-face time were spent in review of the patient's medical record to date.  This included review of previous: clinic visits, hospital records, lab results, imaging studies, and procedural documentation.  The findings from this review are summarized in the above note.        Again, thank you for allowing me to participate in the care of your patient.        Sincerely,        Tang Maddox MD

## 2020-10-07 NOTE — PROGRESS NOTES
"Geri Zamorano is a 65 year old female who is being evaluated via a billable telephone visit.      The patient has been notified of following:     \"This telephone visit will be conducted via a call between you and your physician/provider. We have found that certain health care needs can be provided without the need for a physical exam.  This service lets us provide the care you need with a short phone conversation.  If a prescription is necessary we can send it directly to your pharmacy.  If lab work is needed we can place an order for that and you can then stop by our lab to have the test done at a later time.    Telephone visits are billed at different rates depending on your insurance coverage. During this emergency period, for some insurers they may be billed the same as an in-person visit.  Please reach out to your insurance provider with any questions.    If during the course of the call the physician/provider feels a telephone visit is not appropriate, you will not be charged for this service.\"    Patient has given verbal consent for Telephone visit?  Yes    What phone number would you like to be contacted at? [phone]    How would you like to obtain your AVS? Veronique    Phone call duration: 23 minutes  _________________________________________________________________________    [facility]    Hepatology follow-up    Follow-up visit for cirrhosis    Subjective:  65 year old female    Cirrhosis  - dx Jan 2020  - cryptogenic/BRITO  - liver bx 1/21/2020  - no hx ascites, HE or variceal bleed  - last EGD Feb 2020- diminutive EV  - HCC screening- abd U/S Sep 2020    Last visit 04/2020.  The patient was recently admitted to [facility] for acute on chronic diastolic heart failure after initially presenting with dyspnea.  She responded well to IV diuresis.  Patient was noted to have recurrent pericardial effusion but no tamponade on echocardiogram and ECG.  Left heart cath " showed severe aortic stenosis (GIOVANNA 0.77) and right heart cath was consistent with pulmonary hypertension (mean PA pressure ~37mmHg).  The patient was also noted to have pulmonary fibrosis on CT and idiopathic pulmonary fibrosis/interstitial lung disease is now being considered.      The patient has been switched from simvastatin to atorvastatin.  Her labetalol dose has also been increased and she has started torsemide.  She has a visit with the cardiology valve clinic scheduled at the end of month to discuss possible interventional approaches.      The patient is feeling a little run down but better compared to when she was admitted to the hospital.  Her dyspnea has resolved.  She denies any chest pain or palpitations.  She does report some mild lightheadedness which may be related to diuretics.        The patient denies any jaundice, itch, lower extremity edema (this was not an issue prior to admission), lethargy or confusion.      The patient denies melena, hematemesis or hematochezia.      The patient denies fevers, sweats or chills.  No cough.      Weight has been stable since discharge from hospital.  If anything, patient's weight is down a couple pounds since Sunday.  Appetite is good.      The patient does not drink alcohol.  She continues to practice social distancing and wears a mask whenever she is out in public.       Medical hx Surgical hx   Past Medical History:   Diagnosis Date     Aortic stenosis      Asthma      Depression with anxiety      GERD (gastroesophageal reflux disease)      Hyperlipidemia      Hypertension      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) 01/2020     Obesity      SLE (systemic lupus erythematosus) (H) 02/2020     Thyroid cancer (H) 1996      Past Surgical History:   Procedure Laterality Date     C TOTAL ABDOM HYSTERECTOMY       CHOLECYSTECTOMY       ESOPHAGOSCOPY, GASTROSCOPY, DUODENOSCOPY (EGD), COMBINED N/A 2/26/2020    Procedure: ESOPHAGOGASTRODUODENOSCOPY, WITH BIOPSY;   Surgeon: Leventhal, Thomas Michael, MD;  [facility]     IR LIVER BIOPSY PERCUTANEOUS  1/21/2020     PERCUTANEOUS BIOPSY LIVER Right 1/21/2020    Procedure: Liver Biopsy @9:00;  Surgeon: Emanuel Archer PA-C;  Location: [facility] OR     SALPINGO-OOPHORECTOMY BILATERAL       SHOULDER SURGERY       THYROIDECTOMY  1996          Medications  Prior to Admission medications    Medication Sig Start Date End Date Taking? Authorizing Provider   albuterol (PROAIR HFA/PROVENTIL HFA/VENTOLIN HFA) 108 (90 Base) MCG/ACT Inhaler Inhale 2 puffs into the lungs daily    Yes Reported, Patient   amLODIPine (NORVASC) 2.5 MG tablet daily  1/30/19  Yes Reported, Patient   atorvastatin (LIPITOR) 20 MG tablet Take 20 mg by mouth daily   Yes Reported, Patient   azaTHIOprine (IMURAN) 50 MG tablet Take 1 tablet by mouth daily 2/10/20  Yes Reported, Patient   BREO ELLIPTA 200-25 MCG/INH Inhaler INL 1 PUFF PO DAILY. RM AFTER U 6/24/19  Yes Reported, Patient   calcitRIOL (ROCALTROL) 0.25 MCG capsule Take 0.5 mcg by mouth daily  8/15/16  Yes Reported, Patient   calcium carbonate-vitamin D (CALCIUM 600/VITAMIN D) 600-400 MG-UNIT CHEW Take 4 chew tab by mouth 3 times daily  1/13/1996  Yes Reported, Patient   colchicine (MITIGARE) 0.6 MG capsule Take 0.6 mg by mouth 2 times daily 1/17/20  Yes Reported, Patient   escitalopram (LEXAPRO) 20 MG tablet Take 20 mg by mouth daily 3/2/18  Yes Reported, Patient   Ferrous Sulfate (IRON) 325 (65 Fe) MG tablet Take 1 tablet by mouth daily  2/10/18  Yes Reported, Patient   hydroxychloroquine (PLAQUENIL) 200 MG tablet Take 200 mg by mouth 2 times daily 1/31/20  Yes Reported, Patient   labetalol (NORMODYNE) 200 MG tablet Take 200 mg by mouth 2 times daily  7/3/17  Yes Reported, Patient   levothyroxine (SYNTHROID/LEVOTHROID) 200 MCG tablet Take 250 mcg by mouth daily  1/13/1996  Yes Reported, Patient   lisinopril (PRINIVIL/ZESTRIL) 20 MG tablet Take 20 mg by mouth daily 5/1/01  Yes Reported, Patient    montelukast (SINGULAIR) 10 MG tablet Take 10 mg by mouth daily 5/1/1995  Yes Reported, Patient   pantoprazole (PROTONIX) 20 MG EC tablet Take 1 tablet (20 mg) by mouth daily 2/26/20  Yes Leventhal, Thomas Michael, MD   predniSONE (DELTASONE) 20 MG tablet Take 30 mg by mouth daily 1/31/20  Yes Reported, Patient   SPIRIVA RESPIMAT 2.5 MCG/ACT inhaler INL 2 PFS PO QD 12/20/19  Yes Reported, Patient   sulfamethoxazole-trimethoprim (BACTRIM DS) 800-160 MG tablet Take 1 tablet by mouth every 48 hours 2/5/20  Yes Reported, Patient   topiramate (TOPAMAX) 25 MG tablet Take 2 tablets by mouth At Bedtime 6/12/18  Yes Reported, Patient   torsemide (DEMADEX) 20 MG tablet Take 20 mg by mouth daily 10/4/20  Yes Reported, Patient       Allergies  Allergies   Allergen Reactions     Dairy Digestive Diarrhea       Review of systems  A 10-point review of systems was negative    Examination  Nil    Laboratory  Lab Results   Component Value Date     09/14/2020    POTASSIUM 4.1 09/14/2020    CHLORIDE 102 09/14/2020    CO2 33 09/14/2020    BUN 11 09/14/2020    CR 0.7 09/14/2020       Lab Results   Component Value Date    BILITOTAL 0.8 09/14/2020    ALT 24 09/14/2020    AST 33 09/14/2020    ALKPHOS 70 09/14/2020       Lab Results   Component Value Date    ALBUMIN 3.8 09/14/2020    PROTTOTAL 6.1 09/14/2020        Lab Results   Component Value Date    WBC 5.3 09/14/2020    HGB 13.2 09/14/2020    MCV 87 09/14/2020     09/14/2020       Lab Results   Component Value Date    INR 1.35 01/06/2020       Radiology  Abd U/S Sep 2020 reviewed- no mass or ascites    Assessment  65-year-old female who presents for routine follow-up of compensated cryptogenic/BRITO cirrhosis.  MELD-Na= 8 (stable).  Child-Jana-Kwan class A (score= 5).  No evidence of ascites or hepatic encephalopathy.  Holly-pulmonary hypertension should be considered but would be less likely due to improved PA pressure following diuresis and other etiologies of pulmonary  hypertension (SLE, ?ILD, left heart disease/aortic stenosis).  The patient's liver function is adequate to proceed with cardiac surgery if indicated.  Up-to-date with surveillance of esophageal varices.  Up-to-date with screening for HCC.     Plan  1.  Low Na diet  2.  Diuretics per cardiology  3.  Continue atorvastatin  4.  OK to proceed with cardiac surgery if indicated  5.  Follow-up in 6 months    Tang Maddox MD  Hepatology  [facility]    Approximately 35 minutes of non face-to-face time were spent in review of the patient's medical record to date.  This included review of previous: clinic visits, hospital records, lab results, imaging studies, and procedural documentation.  The findings from this review are summarized in the above note.

## 2021-01-03 ENCOUNTER — HEALTH MAINTENANCE LETTER (OUTPATIENT)
Age: 66
End: 2021-01-03

## 2021-04-25 ENCOUNTER — HEALTH MAINTENANCE LETTER (OUTPATIENT)
Age: 66
End: 2021-04-25

## 2021-06-16 ENCOUNTER — OFFICE VISIT (OUTPATIENT)
Dept: GASTROENTEROLOGY | Facility: CLINIC | Age: 66
End: 2021-06-16
Attending: INTERNAL MEDICINE
Payer: COMMERCIAL

## 2021-06-16 ENCOUNTER — ANCILLARY PROCEDURE (OUTPATIENT)
Dept: ULTRASOUND IMAGING | Facility: CLINIC | Age: 66
End: 2021-06-16
Attending: INTERNAL MEDICINE
Payer: COMMERCIAL

## 2021-06-16 VITALS
WEIGHT: 181.4 LBS | TEMPERATURE: 98.3 F | HEIGHT: 64 IN | HEART RATE: 57 BPM | DIASTOLIC BLOOD PRESSURE: 76 MMHG | RESPIRATION RATE: 18 BRPM | OXYGEN SATURATION: 99 % | BODY MASS INDEX: 30.97 KG/M2 | SYSTOLIC BLOOD PRESSURE: 157 MMHG

## 2021-06-16 DIAGNOSIS — K75.81 LIVER CIRRHOSIS SECONDARY TO NASH (H): ICD-10-CM

## 2021-06-16 DIAGNOSIS — K75.81 LIVER CIRRHOSIS SECONDARY TO NASH (H): Primary | ICD-10-CM

## 2021-06-16 DIAGNOSIS — K74.60 LIVER CIRRHOSIS SECONDARY TO NASH (H): Primary | ICD-10-CM

## 2021-06-16 DIAGNOSIS — K74.60 LIVER CIRRHOSIS SECONDARY TO NASH (H): ICD-10-CM

## 2021-06-16 LAB
ALBUMIN SERPL-MCNC: 3.1 G/DL (ref 3.4–5)
ALP SERPL-CCNC: 65 U/L (ref 40–150)
ALT SERPL W P-5'-P-CCNC: 14 U/L (ref 0–50)
ANION GAP SERPL CALCULATED.3IONS-SCNC: 6 MMOL/L (ref 3–14)
AST SERPL W P-5'-P-CCNC: 21 U/L (ref 0–45)
BILIRUB DIRECT SERPL-MCNC: 0.2 MG/DL (ref 0–0.2)
BILIRUB SERPL-MCNC: 0.6 MG/DL (ref 0.2–1.3)
BUN SERPL-MCNC: 18 MG/DL (ref 7–30)
CALCIUM SERPL-MCNC: 9.3 MG/DL (ref 8.5–10.1)
CHLORIDE SERPL-SCNC: 109 MMOL/L (ref 94–109)
CO2 SERPL-SCNC: 30 MMOL/L (ref 20–32)
CREAT SERPL-MCNC: 1.1 MG/DL (ref 0.52–1.04)
ERYTHROCYTE [DISTWIDTH] IN BLOOD BY AUTOMATED COUNT: 17 % (ref 10–15)
GFR SERPL CREATININE-BSD FRML MDRD: 52 ML/MIN/{1.73_M2}
GLUCOSE SERPL-MCNC: 110 MG/DL (ref 70–99)
HCT VFR BLD AUTO: 34.6 % (ref 35–47)
HGB BLD-MCNC: 11.1 G/DL (ref 11.7–15.7)
INR PPP: 1.03 (ref 0.86–1.14)
MCH RBC QN AUTO: 28.8 PG (ref 26.5–33)
MCHC RBC AUTO-ENTMCNC: 32.1 G/DL (ref 31.5–36.5)
MCV RBC AUTO: 90 FL (ref 78–100)
PLATELET # BLD AUTO: 111 10E9/L (ref 150–450)
POTASSIUM SERPL-SCNC: 4.2 MMOL/L (ref 3.4–5.3)
PROT SERPL-MCNC: 6.5 G/DL (ref 6.8–8.8)
RBC # BLD AUTO: 3.85 10E12/L (ref 3.8–5.2)
SODIUM SERPL-SCNC: 146 MMOL/L (ref 133–144)
WBC # BLD AUTO: 3.8 10E9/L (ref 4–11)

## 2021-06-16 PROCEDURE — 85027 COMPLETE CBC AUTOMATED: CPT | Performed by: PATHOLOGY

## 2021-06-16 PROCEDURE — 80048 BASIC METABOLIC PNL TOTAL CA: CPT | Performed by: PATHOLOGY

## 2021-06-16 PROCEDURE — 36415 COLL VENOUS BLD VENIPUNCTURE: CPT | Performed by: PATHOLOGY

## 2021-06-16 PROCEDURE — G0463 HOSPITAL OUTPT CLINIC VISIT: HCPCS

## 2021-06-16 PROCEDURE — 76700 US EXAM ABDOM COMPLETE: CPT | Mod: GC | Performed by: RADIOLOGY

## 2021-06-16 PROCEDURE — 80076 HEPATIC FUNCTION PANEL: CPT | Performed by: PATHOLOGY

## 2021-06-16 PROCEDURE — 99215 OFFICE O/P EST HI 40 MIN: CPT | Performed by: INTERNAL MEDICINE

## 2021-06-16 PROCEDURE — 85610 PROTHROMBIN TIME: CPT | Performed by: PATHOLOGY

## 2021-06-16 RX ORDER — POTASSIUM CHLORIDE 1500 MG/1
2 TABLET, EXTENDED RELEASE ORAL 2 TIMES DAILY PRN
COMMUNITY
Start: 2021-04-28

## 2021-06-16 RX ORDER — OMEPRAZOLE 40 MG/1
40 CAPSULE, DELAYED RELEASE ORAL DAILY
COMMUNITY
Start: 2021-03-26

## 2021-06-16 RX ORDER — ASCORBIC ACID 500 MG
500 TABLET ORAL DAILY
COMMUNITY
Start: 2020-10-22

## 2021-06-16 RX ORDER — METOPROLOL SUCCINATE 100 MG/1
100 TABLET, EXTENDED RELEASE ORAL 2 TIMES DAILY
COMMUNITY
Start: 2021-05-10

## 2021-06-16 ASSESSMENT — MIFFLIN-ST. JEOR: SCORE: 1337.83

## 2021-06-16 ASSESSMENT — PAIN SCALES - GENERAL: PAINLEVEL: NO PAIN (0)

## 2021-06-16 NOTE — LETTER
6/16/2021         RE: Geri NOVOA Jaun  [address] [place] [address]      [facility]    Hepatology follow-up    Follow-up visit for cirrhosis    Subjective:  66 year old female    Cirrhosis  - dx Jan 2020  - cryptogenic/?AIH/BRITO  - liver bx 1/21/2020  - no hx ascites, HE or variceal bleed  - last EGD Feb 2020- diminutive EV  - HCC screening- abd U/S 6/16/2021    Last visit 10/2020.  The patient has been admitted to hospital several times with COVID-19 infection and decompensated heart failure with preserved ejection fraction since November.  Most recently, the patient was admitted to the hospital in 05/2021 with decompensated heart failure with a preserved ejection fraction.  She has since seen cardiology, who feel that she will need a mechanical heart valve in the near future given the changes seen on her echocardiogram and clinical symptoms.  Due to the patient's relatively young age, she will need a mechanical heart valve that will require therapeutic anticoagulation.    The patient is feeling okay today.  She reports dyspnea on exertion: she is able to walk approximately 1 block on the flat and is very limited walking upstairs.  The patient reports occasional chest pain, syncope and palpitations.    The patient denies any history of jaundice, abdominal distention, lower extremity edema or confusion.    She denies any history of melena, hematemesis or hematochezia.    No history of fever, sweats or chills.  The patient completed her COVID-19 vaccinations in 04/2021.      Her weight is relatively stable.  She has lost 20-30 pounds overall since her last in-person visit 18 months ago.  Appetite is okay.      The patient rarely if ever drinks alcohol.  She had a couple of sips of her 's drink when she was out for dinner last night to celebrate their 51 year wedding anniversary.        Medical hx Surgical hx   Past Medical History:   Diagnosis Date     Aortic stenosis  10/2020     Asthma      Depression with anxiety      GERD (gastroesophageal reflux disease)      Hyperlipidemia      Hypertension      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) 01/2020     Obesity      SLE (systemic lupus erythematosus) (H) 02/2020     Thyroid cancer (H) 1996      Past Surgical History:   Procedure Laterality Date     C TOTAL ABDOM HYSTERECTOMY       CHOLECYSTECTOMY       ESOPHAGOSCOPY, GASTROSCOPY, DUODENOSCOPY (EGD), COMBINED N/A 2/26/2020    Procedure: ESOPHAGOGASTRODUODENOSCOPY, WITH BIOPSY;  Surgeon: Leventhal, Thomas Michael, MD;  [facility]: [facility]     IR LIVER BIOPSY PERCUTANEOUS  1/21/2020     PERCUTANEOUS BIOPSY LIVER Right 1/21/2020    Procedure: Liver Biopsy @9:00;  Surgeon: Emanuel Archer PA-C;  Location: [facility] OR     SALPINGO-OOPHORECTOMY BILATERAL       SHOULDER SURGERY       THYROIDECTOMY  1996          Medications  Prior to Admission medications    Medication Sig Start Date End Date Taking? Authorizing Provider   albuterol (PROAIR HFA/PROVENTIL HFA/VENTOLIN HFA) 108 (90 Base) MCG/ACT Inhaler Inhale 2 puffs into the lungs daily    Yes Reported, Patient   atorvastatin (LIPITOR) 20 MG tablet Take 20 mg by mouth daily   Yes Reported, Patient   azaTHIOprine (IMURAN) 50 MG tablet Take 1 tablet by mouth daily 2/10/20  Yes Reported, Patient   BREO ELLIPTA 200-25 MCG/INH Inhaler INL 1 PUFF PO DAILY. RM AFTER U 6/24/19  Yes Reported, Patient   calcitRIOL (ROCALTROL) 0.25 MCG capsule Take 0.5 mcg by mouth 2 times daily  8/15/16  Yes Reported, Patient   calcium carbonate-vitamin D (CALCIUM 600/VITAMIN D) 600-400 MG-UNIT CHEW Take 4 chew tab by mouth 3 times daily  1/13/1996  Yes Reported, Patient   cholecalciferol (VITAMIN D3) 25 mcg (1000 units) capsule Take 1 capsule by mouth daily   Yes Reported, Patient   colchicine (MITIGARE) 0.6 MG capsule Take 0.6 mg by mouth 2 times daily 1/17/20  Yes Reported, Patient   escitalopram (LEXAPRO) 20 MG tablet Take 20 mg by mouth daily 3/2/18  Yes Reported,  Patient   Ferrous Sulfate (IRON) 325 (65 Fe) MG tablet Take 1 tablet by mouth daily  2/10/18  Yes Reported, Patient   hydroxychloroquine (PLAQUENIL) 200 MG tablet Take 200 mg by mouth 2 times daily 1/31/20  Yes Reported, Patient   levothyroxine (SYNTHROID/LEVOTHROID) 200 MCG tablet Take 250 mcg by mouth daily  1/13/1996  Yes Reported, Patient   lisinopril (PRINIVIL/ZESTRIL) 20 MG tablet Take 20 mg by mouth daily 5/1/01  Yes Reported, Patient   metoprolol succinate ER (TOPROL-XL) 100 MG 24 hr tablet Take 100 mg by mouth 2 times daily 5/10/21  Yes Reported, Patient   montelukast (SINGULAIR) 10 MG tablet Take 10 mg by mouth daily 5/1/1995  Yes Reported, Patient   omeprazole (PRILOSEC) 40 MG DR capsule Take 40 mg by mouth daily 3/26/21  Yes Reported, Patient   potassium chloride ER (KLOR-CON M) 20 MEQ CR tablet Take 2 tablets by mouth 2 times daily as needed Take only when taking Torsemide 4/28/21  Yes Reported, Patient   SPIRIVA RESPIMAT 2.5 MCG/ACT inhaler INL 2 PFS PO QD 12/20/19  Yes Reported, Patient   torsemide (DEMADEX) 20 MG tablet Take 20 mg by mouth daily as needed (edema)  10/4/20  Yes Reported, Patient   vitamin B complex with vitamin C (VITAMIN  B COMPLEX) tablet Take 1 tablet by mouth daily   Yes Reported, Patient   vitamin C (ASCORBIC ACID) 500 MG tablet Take 500 mg by mouth daily 10/22/20  Yes Reported, Patient   amLODIPine (NORVASC) 2.5 MG tablet daily  1/30/19   Reported, Patient   labetalol (NORMODYNE) 200 MG tablet Take 200 mg by mouth 2 times daily  7/3/17   Reported, Patient   pantoprazole (PROTONIX) 20 MG EC tablet Take 1 tablet (20 mg) by mouth daily  Patient not taking: Reported on 6/16/2021 2/26/20   Leventhal, Thomas Michael, MD   predniSONE (DELTASONE) 20 MG tablet Take 30 mg by mouth daily 1/31/20   Reported, Patient   sulfamethoxazole-trimethoprim (BACTRIM DS) 800-160 MG tablet Take 1 tablet by mouth every 48 hours 2/5/20   Reported, Patient   topiramate (TOPAMAX) 25 MG tablet Take 2  "tablets by mouth At Bedtime 6/12/18   Reported, Patient       Allergies  Allergies   Allergen Reactions     Dairy Digestive Diarrhea       Review of systems  A 10-point review of systems was negative    Examination  BP (!) 157/76 (BP Location: Right arm, Patient Position: Sitting, Cuff Size: Adult Large)   Pulse 57   Temp 98.3  F (36.8  C) (Oral)   Resp 18   Ht 1.626 m (5' 4\")   Wt 82.3 kg (181 lb 6.4 oz)   SpO2 99%   BMI 31.14 kg/m    Body mass index is 31.14 kg/m .    Gen- well, NAD, A+Ox3, normal color  CVS- end-systolic murmur radiating to carotids, no added sounds, RRR  RS- CTA  Abd- overweight, striae, soft, non-tender, no ascites or organomegaly on palpation or percussion, BS+  Extr- hands normal, no ALEX  Skin- no rash or jaundice  Neuro- no asterixis  Psych- normal mood    Laboratory  Lab Results   Component Value Date     06/16/2021    POTASSIUM 4.2 06/16/2021    CHLORIDE 109 06/16/2021    CO2 30 06/16/2021    BUN 18 06/16/2021    CR 1.10 06/16/2021       Lab Results   Component Value Date    BILITOTAL 0.6 06/16/2021    ALT 14 06/16/2021    AST 21 06/16/2021    ALKPHOS 65 06/16/2021       Lab Results   Component Value Date    ALBUMIN 3.1 06/16/2021    PROTTOTAL 6.5 06/16/2021        Lab Results   Component Value Date    WBC 3.8 06/16/2021    HGB 11.1 06/16/2021    MCV 90 06/16/2021     06/16/2021       Lab Results   Component Value Date    INR 1.03 06/16/2021       Radiology  Abd U/S 6/16/2021 (P)    Assessment  66-year-old female who presents for followup of compensated cryptogenic/BRITO cirrhosis who will need mechanical aortic valve replacement for severe aortic stenosis, related to SLE, complicated with heart failure with preserved ejection fraction.  MELD-Na= 7 (stable).  Child-Kwan score= 6, class A.  No evidence of ascites or hepatic encephalopathy.  No contraindications towards proceeding with therapeutic anticoagulation for mechanical heart valve:  INR is normal and liver disease " is well-compensated.  Will need upper endoscopy for surveillance of esophageal varices later this year.  Up to date with HCC screening.    Vocal-Garrison score:    30-day mortality 4.7%.  90-day mortality 9.5%.  180-day mortality 11.3%.    We discussed participation in the [facility] patient-related outcomes study.  The patient understands that the study may later link survey data with clinical data.  The patient would like to be contacted to participate in the study.    Plan  1.  OK to proceed with surgery as planned  2.  No additional investigations required at this time  3.  EGD with me later this year  4.  Follow-up in 3 months    Tang Maddox MD  Hepatology  [facility]

## 2021-06-16 NOTE — NURSING NOTE
"Chief Complaint   Patient presents with     RECHECK     Cirrhosis       Vital signs:  Temp: 98.3  F (36.8  C) Temp src: Oral BP: (!) 157/76 Pulse: 57   Resp: 18 SpO2: 99 %     Height: 162.6 cm (5' 4\") Weight: 82.3 kg (181 lb 6.4 oz)  Estimated body mass index is 31.14 kg/m  as calculated from the following:    Height as of this encounter: 1.626 m (5' 4\").    Weight as of this encounter: 82.3 kg (181 lb 6.4 oz).        Fadumo Lechuga, [facility]  6/16/2021 7:31 AM      "

## 2021-06-16 NOTE — LETTER
6/16/2021         RE: Geri Zamorano  [address]  [facility] [address]        Dear Colleague,    Thank you for referring your patient, Geri Zamorano, to the [facility] [place]. Please see a copy of my visit note below.    [facility]    Hepatology follow-up    Follow-up visit for cirrhosis    Subjective:  66 year old female    Cirrhosis  - dx Jan 2020  - cryptogenic/?AIH/BRITO  - liver bx 1/21/2020  - no hx ascites, HE or variceal bleed  - last EGD Feb 2020- diminutive EV  - HCC screening- abd U/S 6/16/2021    Last visit 10/2020.  The patient has been admitted to hospital several times with COVID-19 infection and decompensated heart failure with preserved ejection fraction since November.  Most recently, the patient was admitted to the hospital in 05/2021 with decompensated heart failure with a preserved ejection fraction.  She has since seen cardiology, who feel that she will need a mechanical heart valve in the near future given the changes seen on her echocardiogram and clinical symptoms.  Due to the patient's relatively young age, she will need a mechanical heart valve that will require therapeutic anticoagulation.    The patient is feeling okay today.  She reports dyspnea on exertion: she is able to walk approximately 1 block on the flat and is very limited walking upstairs.  The patient reports occasional chest pain, syncope and palpitations.    The patient denies any history of jaundice, abdominal distention, lower extremity edema or confusion.    She denies any history of melena, hematemesis or hematochezia.    No history of fever, sweats or chills.  The patient completed her COVID-19 vaccinations in 04/2021.      Her weight is relatively stable.  She has lost 20-30 pounds overall since her last in-person visit 18 months ago.  Appetite is okay.      The patient rarely if ever drinks alcohol.  She had a couple of sips of her 's drink  when she was out for dinner last night to celebrate their 51 year wedding anniversary.        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     Obesity      SLE (systemic lupus erythematosus) (H) 02/2020     Thyroid cancer (H) 1996      Past Surgical History:   Procedure Laterality Date     C TOTAL ABDOM HYSTERECTOMY       CHOLECYSTECTOMY       ESOPHAGOSCOPY, GASTROSCOPY, DUODENOSCOPY (EGD), COMBINED N/A 2/26/2020    Procedure: ESOPHAGOGASTRODUODENOSCOPY, WITH BIOPSY;  Surgeon: Leventhal, Thomas Michael, MD;  [facility]     IR LIVER BIOPSY PERCUTANEOUS  1/21/2020     PERCUTANEOUS BIOPSY LIVER Right 1/21/2020    Procedure: Liver Biopsy @9:00;  Surgeon: Emanuel Archer PA-C;  Location: [facility] OR     SALPINGO-OOPHORECTOMY BILATERAL       SHOULDER SURGERY       THYROIDECTOMY  1996          Medications  Prior to Admission medications    Medication Sig Start Date End Date Taking? Authorizing Provider   albuterol (PROAIR HFA/PROVENTIL HFA/VENTOLIN HFA) 108 (90 Base) MCG/ACT Inhaler Inhale 2 puffs into the lungs daily    Yes Reported, Patient   atorvastatin (LIPITOR) 20 MG tablet Take 20 mg by mouth daily   Yes Reported, Patient   azaTHIOprine (IMURAN) 50 MG tablet Take 1 tablet by mouth daily 2/10/20  Yes Reported, Patient   BREO ELLIPTA 200-25 MCG/INH Inhaler INL 1 PUFF PO DAILY. RM AFTER U 6/24/19  Yes Reported, Patient   calcitRIOL (ROCALTROL) 0.25 MCG capsule Take 0.5 mcg by mouth 2 times daily  8/15/16  Yes Reported, Patient   calcium carbonate-vitamin D (CALCIUM 600/VITAMIN D) 600-400 MG-UNIT CHEW Take 4 chew tab by mouth 3 times daily  1/13/1996  Yes Reported, Patient   cholecalciferol (VITAMIN D3) 25 mcg (1000 units) capsule Take 1 capsule by mouth daily   Yes Reported, Patient   colchicine  (MITIGARE) 0.6 MG capsule Take 0.6 mg by mouth 2 times daily 1/17/20  Yes Reported, Patient   escitalopram (LEXAPRO) 20 MG tablet Take 20 mg by mouth daily 3/2/18  Yes Reported, Patient   Ferrous Sulfate (IRON) 325 (65 Fe) MG tablet Take 1 tablet by mouth daily  2/10/18  Yes Reported, Patient   hydroxychloroquine (PLAQUENIL) 200 MG tablet Take 200 mg by mouth 2 times daily 1/31/20  Yes Reported, Patient   levothyroxine (SYNTHROID/LEVOTHROID) 200 MCG tablet Take 250 mcg by mouth daily  1/13/1996  Yes Reported, Patient   lisinopril (PRINIVIL/ZESTRIL) 20 MG tablet Take 20 mg by mouth daily 5/1/01  Yes Reported, Patient   metoprolol succinate ER (TOPROL-XL) 100 MG 24 hr tablet Take 100 mg by mouth 2 times daily 5/10/21  Yes Reported, Patient   montelukast (SINGULAIR) 10 MG tablet Take 10 mg by mouth daily 5/1/1995  Yes Reported, Patient   omeprazole (PRILOSEC) 40 MG DR capsule Take 40 mg by mouth daily 3/26/21  Yes Reported, Patient   potassium chloride ER (KLOR-CON M) 20 MEQ CR tablet Take 2 tablets by mouth 2 times daily as needed Take only when taking Torsemide 4/28/21  Yes Reported, Patient   SPIRIVA RESPIMAT 2.5 MCG/ACT inhaler INL 2 PFS PO QD 12/20/19  Yes Reported, Patient   torsemide (DEMADEX) 20 MG tablet Take 20 mg by mouth daily as needed (edema)  10/4/20  Yes Reported, Patient   vitamin B complex with vitamin C (VITAMIN  B COMPLEX) tablet Take 1 tablet by mouth daily   Yes Reported, Patient   vitamin C (ASCORBIC ACID) 500 MG tablet Take 500 mg by mouth daily 10/22/20  Yes Reported, Patient   amLODIPine (NORVASC) 2.5 MG tablet daily  1/30/19   Reported, Patient   labetalol (NORMODYNE) 200 MG tablet Take 200 mg by mouth 2 times daily  7/3/17   Reported, Patient   pantoprazole (PROTONIX) 20 MG EC tablet Take 1 tablet (20 mg) by mouth daily  Patient not taking: Reported on 6/16/2021 2/26/20   Leventhal, Thomas Michael, MD   predniSONE (DELTASONE) 20 MG tablet Take 30 mg by mouth daily 1/31/20   Reported, Patient  "  sulfamethoxazole-trimethoprim (BACTRIM DS) 800-160 MG tablet Take 1 tablet by mouth every 48 hours 2/5/20   Reported, Patient   topiramate (TOPAMAX) 25 MG tablet Take 2 tablets by mouth At Bedtime 6/12/18   Reported, Patient       Allergies  Allergies   Allergen Reactions     Dairy Digestive Diarrhea       Review of systems  A 10-point review of systems was negative    Examination  BP (!) 157/76 (BP Location: Right arm, Patient Position: Sitting, Cuff Size: Adult Large)   Pulse 57   Temp 98.3  F (36.8  C) (Oral)   Resp 18   Ht 1.626 m (5' 4\")   Wt 82.3 kg (181 lb 6.4 oz)   SpO2 99%   BMI 31.14 kg/m    Body mass index is 31.14 kg/m .    Gen- well, NAD, A+Ox3, normal color  CVS- end-systolic murmur radiating to carotids, no added sounds, RRR  RS- CTA  Abd- overweight, striae, soft, non-tender, no ascites or organomegaly on palpation or percussion, BS+  Extr- hands normal, no ALEX  Skin- no rash or jaundice  Neuro- no asterixis  Psych- normal mood    Laboratory  Lab Results   Component Value Date     06/16/2021    POTASSIUM 4.2 06/16/2021    CHLORIDE 109 06/16/2021    CO2 30 06/16/2021    BUN 18 06/16/2021    CR 1.10 06/16/2021       Lab Results   Component Value Date    BILITOTAL 0.6 06/16/2021    ALT 14 06/16/2021    AST 21 06/16/2021    ALKPHOS 65 06/16/2021       Lab Results   Component Value Date    ALBUMIN 3.1 06/16/2021    PROTTOTAL 6.5 06/16/2021        Lab Results   Component Value Date    WBC 3.8 06/16/2021    HGB 11.1 06/16/2021    MCV 90 06/16/2021     06/16/2021       Lab Results   Component Value Date    INR 1.03 06/16/2021       Radiology  Abd U/S 6/16/2021 (P)    Assessment  66-year-old female who presents for followup of compensated cryptogenic/BRITO cirrhosis who will need mechanical aortic valve replacement for severe aortic stenosis, related to SLE, complicated with heart failure with preserved ejection fraction.  MELD-Na= 7 (stable).  Child-Kwan score= 6, class A.  No evidence " of ascites or hepatic encephalopathy.  No contraindications towards proceeding with therapeutic anticoagulation for mechanical heart valve:  INR is normal and liver disease is well-compensated.  Will need upper endoscopy for surveillance of esophageal varices later this year.  Up to date with HCC screening.    Vocal-Av score:    30-day mortality 4.7%.  90-day mortality 9.5%.  180-day mortality 11.3%.    We discussed participation in the [facility] patient-related outcomes study.  The patient understands that the study may later link survey data with clinical data.  The patient would like to be contacted to participate in the study.    Plan  1.  OK to proceed with surgery as planned  2.  No additional investigations required at this time  3.  EGD with me later this year  4.  Follow-up in 3 months    Tang Maddox MD  Hepatology  [facility]

## 2021-06-16 NOTE — PROGRESS NOTES
[facility]    Hepatology follow-up    Follow-up visit for cirrhosis    Subjective:  66 year old female    Cirrhosis  - dx Jan 2020  - cryptogenic/?AIH/BRITO  - liver bx 1/21/2020  - no hx ascites, HE or variceal bleed  - last EGD Feb 2020- diminutive EV  - HCC screening- abd U/S 6/16/2021    Last visit 10/2020.  The patient has been admitted to hospital several times with COVID-19 infection and decompensated heart failure with preserved ejection fraction since November.  Most recently, the patient was admitted to the hospital in 05/2021 with decompensated heart failure with a preserved ejection fraction.  She has since seen cardiology, who feel that she will need a mechanical heart valve in the near future given the changes seen on her echocardiogram and clinical symptoms.  Due to the patient's relatively young age, she will need a mechanical heart valve that will require therapeutic anticoagulation.    The patient is feeling okay today.  She reports dyspnea on exertion: she is able to walk approximately 1 block on the flat and is very limited walking upstairs.  The patient reports occasional chest pain, syncope and palpitations.    The patient denies any history of jaundice, abdominal distention, lower extremity edema or confusion.    She denies any history of melena, hematemesis or hematochezia.    No history of fever, sweats or chills.  The patient completed her COVID-19 vaccinations in 04/2021.      Her weight is relatively stable.  She has lost 20-30 pounds overall since her last in-person visit 18 months ago.  Appetite is okay.      The patient rarely if ever drinks alcohol.  She had a couple of sips of her 's drink when she was out for dinner last night to celebrate their 51 year wedding anniversary.        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     Obesity      SLE (systemic lupus erythematosus) (H) 02/2020     Thyroid cancer (H) 1996      Past Surgical History:   Procedure Laterality Date     C TOTAL ABDOM HYSTERECTOMY       CHOLECYSTECTOMY       ESOPHAGOSCOPY, GASTROSCOPY, DUODENOSCOPY (EGD), COMBINED N/A 2/26/2020    Procedure: ESOPHAGOGASTRODUODENOSCOPY, WITH BIOPSY;  Surgeon: Leventhal, Thomas Michael, MD;  [facility]: [facility]     IR LIVER BIOPSY PERCUTANEOUS  1/21/2020     PERCUTANEOUS BIOPSY LIVER Right 1/21/2020    Procedure: Liver Biopsy @9:00;  Surgeon: Emanuel Archer PA-C;  Location: [facility] OR     SALPINGO-OOPHORECTOMY BILATERAL       SHOULDER SURGERY       THYROIDECTOMY  1996          Medications  Prior to Admission medications    Medication Sig Start Date End Date Taking? Authorizing Provider   albuterol (PROAIR HFA/PROVENTIL HFA/VENTOLIN HFA) 108 (90 Base) MCG/ACT Inhaler Inhale 2 puffs into the lungs daily    Yes Reported, Patient   atorvastatin (LIPITOR) 20 MG tablet Take 20 mg by mouth daily   Yes Reported, Patient   azaTHIOprine (IMURAN) 50 MG tablet Take 1 tablet by mouth daily 2/10/20  Yes Reported, Patient   BREO ELLIPTA 200-25 MCG/INH Inhaler INL 1 PUFF PO DAILY. RM AFTER U 6/24/19  Yes Reported, Patient   calcitRIOL (ROCALTROL) 0.25 MCG capsule Take 0.5 mcg by mouth 2 times daily  8/15/16  Yes Reported, Patient   calcium carbonate-vitamin D (CALCIUM 600/VITAMIN D) 600-400 MG-UNIT CHEW Take 4 chew tab by mouth 3 times daily  1/13/1996  Yes Reported, Patient   cholecalciferol (VITAMIN D3) 25 mcg (1000 units) capsule Take 1 capsule by mouth daily   Yes Reported, Patient   colchicine (MITIGARE) 0.6 MG capsule Take 0.6 mg by mouth 2 times daily 1/17/20  Yes Reported, Patient   escitalopram (LEXAPRO) 20 MG tablet Take 20 mg by mouth daily 3/2/18  Yes Reported, Patient   Ferrous Sulfate (IRON) 325 (65 Fe) MG tablet Take 1 tablet by mouth daily   2/10/18  Yes Reported, Patient   hydroxychloroquine (PLAQUENIL) 200 MG tablet Take 200 mg by mouth 2 times daily 1/31/20  Yes Reported, Patient   levothyroxine (SYNTHROID/LEVOTHROID) 200 MCG tablet Take 250 mcg by mouth daily  1/13/1996  Yes Reported, Patient   lisinopril (PRINIVIL/ZESTRIL) 20 MG tablet Take 20 mg by mouth daily 5/1/01  Yes Reported, Patient   metoprolol succinate ER (TOPROL-XL) 100 MG 24 hr tablet Take 100 mg by mouth 2 times daily 5/10/21  Yes Reported, Patient   montelukast (SINGULAIR) 10 MG tablet Take 10 mg by mouth daily 5/1/1995  Yes Reported, Patient   omeprazole (PRILOSEC) 40 MG DR capsule Take 40 mg by mouth daily 3/26/21  Yes Reported, Patient   potassium chloride ER (KLOR-CON M) 20 MEQ CR tablet Take 2 tablets by mouth 2 times daily as needed Take only when taking Torsemide 4/28/21  Yes Reported, Patient   SPIRIVA RESPIMAT 2.5 MCG/ACT inhaler INL 2 PFS PO QD 12/20/19  Yes Reported, Patient   torsemide (DEMADEX) 20 MG tablet Take 20 mg by mouth daily as needed (edema)  10/4/20  Yes Reported, Patient   vitamin B complex with vitamin C (VITAMIN  B COMPLEX) tablet Take 1 tablet by mouth daily   Yes Reported, Patient   vitamin C (ASCORBIC ACID) 500 MG tablet Take 500 mg by mouth daily 10/22/20  Yes Reported, Patient   amLODIPine (NORVASC) 2.5 MG tablet daily  1/30/19   Reported, Patient   labetalol (NORMODYNE) 200 MG tablet Take 200 mg by mouth 2 times daily  7/3/17   Reported, Patient   pantoprazole (PROTONIX) 20 MG EC tablet Take 1 tablet (20 mg) by mouth daily  Patient not taking: Reported on 6/16/2021 2/26/20   Leventhal, Thomas Michael, MD   predniSONE (DELTASONE) 20 MG tablet Take 30 mg by mouth daily 1/31/20   Reported, Patient   sulfamethoxazole-trimethoprim (BACTRIM DS) 800-160 MG tablet Take 1 tablet by mouth every 48 hours 2/5/20   Reported, Patient   topiramate (TOPAMAX) 25 MG tablet Take 2 tablets by mouth At Bedtime 6/12/18   Reported, Patient       Allergies  Allergies  "  Allergen Reactions     Dairy Digestive Diarrhea       Review of systems  A 10-point review of systems was negative    Examination  BP (!) 157/76 (BP Location: Right arm, Patient Position: Sitting, Cuff Size: Adult Large)   Pulse 57   Temp 98.3  F (36.8  C) (Oral)   Resp 18   Ht 1.626 m (5' 4\")   Wt 82.3 kg (181 lb 6.4 oz)   SpO2 99%   BMI 31.14 kg/m    Body mass index is 31.14 kg/m .    Gen- well, NAD, A+Ox3, normal color  CVS- end-systolic murmur radiating to carotids, no added sounds, RRR  RS- CTA  Abd- overweight, striae, soft, non-tender, no ascites or organomegaly on palpation or percussion, BS+  Extr- hands normal, no ALEX  Skin- no rash or jaundice  Neuro- no asterixis  Psych- normal mood    Laboratory  Lab Results   Component Value Date     06/16/2021    POTASSIUM 4.2 06/16/2021    CHLORIDE 109 06/16/2021    CO2 30 06/16/2021    BUN 18 06/16/2021    CR 1.10 06/16/2021       Lab Results   Component Value Date    BILITOTAL 0.6 06/16/2021    ALT 14 06/16/2021    AST 21 06/16/2021    ALKPHOS 65 06/16/2021       Lab Results   Component Value Date    ALBUMIN 3.1 06/16/2021    PROTTOTAL 6.5 06/16/2021        Lab Results   Component Value Date    WBC 3.8 06/16/2021    HGB 11.1 06/16/2021    MCV 90 06/16/2021     06/16/2021       Lab Results   Component Value Date    INR 1.03 06/16/2021       Radiology  Abd U/S 6/16/2021 (P)    Assessment  66-year-old female who presents for followup of compensated cryptogenic/BRITO cirrhosis who will need mechanical aortic valve replacement for severe aortic stenosis, related to SLE, complicated with heart failure with preserved ejection fraction.  MELD-Na= 7 (stable).  Child-Kwan score= 6, class A.  No evidence of ascites or hepatic encephalopathy.  No contraindications towards proceeding with therapeutic anticoagulation for mechanical heart valve:  INR is normal and liver disease is well-compensated.  Will need upper endoscopy for surveillance of esophageal " varices later this year.  Up to date with HCC screening.    Vocal-Av score:    30-day mortality 4.7%.  90-day mortality 9.5%.  180-day mortality 11.3%.    We discussed participation in the [facility] patient-related outcomes study.  The patient understands that the study may later link survey data with clinical data.  The patient would like to be contacted to participate in the study.    Plan  1.  OK to proceed with surgery as planned  2.  No additional investigations required at this time  3.  EGD with me later this year  4.  Follow-up in 3 months    Tang Maddox MD  Hepatology  [facility]

## 2021-09-03 DIAGNOSIS — K74.60 LIVER CIRRHOSIS SECONDARY TO NASH (H): Primary | ICD-10-CM

## 2021-09-03 DIAGNOSIS — K75.81 LIVER CIRRHOSIS SECONDARY TO NASH (H): Primary | ICD-10-CM

## 2021-09-21 ENCOUNTER — LAB (OUTPATIENT)
Dept: LAB | Facility: CLINIC | Age: 66
End: 2021-09-21
Payer: COMMERCIAL

## 2021-09-21 ENCOUNTER — OFFICE VISIT (OUTPATIENT)
Dept: GASTROENTEROLOGY | Facility: CLINIC | Age: 66
End: 2021-09-21
Attending: INTERNAL MEDICINE
Payer: COMMERCIAL

## 2021-09-21 VITALS
OXYGEN SATURATION: 98 % | WEIGHT: 170.3 LBS | DIASTOLIC BLOOD PRESSURE: 85 MMHG | HEART RATE: 75 BPM | SYSTOLIC BLOOD PRESSURE: 157 MMHG | BODY MASS INDEX: 29.23 KG/M2

## 2021-09-21 DIAGNOSIS — K75.81 LIVER CIRRHOSIS SECONDARY TO NASH (H): Primary | ICD-10-CM

## 2021-09-21 DIAGNOSIS — K74.60 LIVER CIRRHOSIS SECONDARY TO NASH (H): ICD-10-CM

## 2021-09-21 DIAGNOSIS — K74.60 LIVER CIRRHOSIS SECONDARY TO NASH (H): Primary | ICD-10-CM

## 2021-09-21 DIAGNOSIS — K75.81 LIVER CIRRHOSIS SECONDARY TO NASH (H): ICD-10-CM

## 2021-09-21 LAB
ALBUMIN SERPL-MCNC: 2.8 G/DL (ref 3.4–5)
ALP SERPL-CCNC: 87 U/L (ref 40–150)
ALT SERPL W P-5'-P-CCNC: 10 U/L (ref 0–50)
ANION GAP SERPL CALCULATED.3IONS-SCNC: 5 MMOL/L (ref 3–14)
AST SERPL W P-5'-P-CCNC: 18 U/L (ref 0–45)
BILIRUB DIRECT SERPL-MCNC: 0.1 MG/DL (ref 0–0.2)
BILIRUB SERPL-MCNC: 0.3 MG/DL (ref 0.2–1.3)
BUN SERPL-MCNC: 11 MG/DL (ref 7–30)
CALCIUM SERPL-MCNC: 7.5 MG/DL (ref 8.5–10.1)
CHLORIDE BLD-SCNC: 107 MMOL/L (ref 94–109)
CO2 SERPL-SCNC: 30 MMOL/L (ref 20–32)
CREAT SERPL-MCNC: 0.7 MG/DL (ref 0.52–1.04)
ERYTHROCYTE [DISTWIDTH] IN BLOOD BY AUTOMATED COUNT: 16 % (ref 10–15)
GFR SERPL CREATININE-BSD FRML MDRD: 89 ML/MIN/1.73M2
GLUCOSE BLD-MCNC: 113 MG/DL (ref 70–99)
HCT VFR BLD AUTO: 31.4 % (ref 35–47)
HGB BLD-MCNC: 9.6 G/DL (ref 11.7–15.7)
INR PPP: 1.26 (ref 0.85–1.15)
MCH RBC QN AUTO: 26 PG (ref 26.5–33)
MCHC RBC AUTO-ENTMCNC: 30.6 G/DL (ref 31.5–36.5)
MCV RBC AUTO: 85 FL (ref 78–100)
PLATELET # BLD AUTO: 180 10E3/UL (ref 150–450)
POTASSIUM BLD-SCNC: 3.7 MMOL/L (ref 3.4–5.3)
PROT SERPL-MCNC: 6.8 G/DL (ref 6.8–8.8)
RBC # BLD AUTO: 3.69 10E6/UL (ref 3.8–5.2)
SODIUM SERPL-SCNC: 142 MMOL/L (ref 133–144)
WBC # BLD AUTO: 3.8 10E3/UL (ref 4–11)

## 2021-09-21 PROCEDURE — 99214 OFFICE O/P EST MOD 30 MIN: CPT | Performed by: INTERNAL MEDICINE

## 2021-09-21 PROCEDURE — 85027 COMPLETE CBC AUTOMATED: CPT | Performed by: PATHOLOGY

## 2021-09-21 PROCEDURE — 36415 COLL VENOUS BLD VENIPUNCTURE: CPT | Performed by: PATHOLOGY

## 2021-09-21 PROCEDURE — 82248 BILIRUBIN DIRECT: CPT | Performed by: PATHOLOGY

## 2021-09-21 PROCEDURE — 85610 PROTHROMBIN TIME: CPT | Performed by: PATHOLOGY

## 2021-09-21 PROCEDURE — 80053 COMPREHEN METABOLIC PANEL: CPT | Performed by: PATHOLOGY

## 2021-09-21 RX ORDER — WARFARIN SODIUM 2 MG/1
2 TABLET ORAL DAILY
COMMUNITY
Start: 2021-09-08 | End: 2023-11-01

## 2021-09-21 ASSESSMENT — PAIN SCALES - GENERAL: PAINLEVEL: NO PAIN (0)

## 2021-09-21 NOTE — LETTER
9/21/2021         RE: Geri Zamorano  [address]  [facility] [address]        Dear Colleague,    Thank you for referring your patient, Geri Zamorano, to the [facility] [place]. Please see a copy of my visit note below.    [facility]    Hepatology follow-up    Follow-up visit for cirrhosis    Subjective:  66 year old female    Cirrhosis  - dx Jan 2020  - cryptogenic/?AIH/BRITO  - liver bx 1/21/2020  - no hx ascites, HE or variceal bleed  - last EGD Feb 2020- diminutive EV  - HCC screening- abd U/S 6/16/2021    Last visit Jun 2021.  Patient underwent mechanical valve replacement in July that needed to be revised to bioprosthetic valve (with root enlargement) due to abnormal leaflet mobility and restricted central fulcrum.  Patient was started on warfarin.    Patient is well today.  She has made a good functional recovery since surgery- her previous dyspnea on exertion has resolved and she continues to attend cardiac rehab.  Patient has no symptoms related to liver disease.     Patient denies jaundice, lower extremity edema, abdominal distension, lethargy or confusion.    Patient denies melena, hematemesis or hematochezia.    Patient denies fevers, sweats or chills.      Weight is gradually decreasing due to intentional weight loss.  She now weighs 170lbs.    Patient has not drank alcohol since last visit.      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     Obesity      SLE (systemic lupus erythematosus) (H) 02/2020     Thyroid cancer (H) 1996      Past Surgical History:   Procedure Laterality Date     C TOTAL ABDOM HYSTERECTOMY       CHOLECYSTECTOMY       ESOPHAGOSCOPY, GASTROSCOPY, DUODENOSCOPY (EGD), COMBINED N/A 2/26/2020     Procedure: ESOPHAGOGASTRODUODENOSCOPY, WITH BIOPSY;  Surgeon: Leventhal, Thomas Michael, MD;  [facility]     IR LIVER BIOPSY PERCUTANEOUS  1/21/2020     PERCUTANEOUS BIOPSY LIVER Right 1/21/2020    Procedure: Liver Biopsy @9:00;  Surgeon: Emanuel Archer PA-C;  [facility]: [facility]     SALPINGO-OOPHORECTOMY BILATERAL       SHOULDER SURGERY       THYROIDECTOMY  1996          Medications  Prior to Admission medications    Medication Sig Start Date End Date Taking? Authorizing Provider   albuterol (PROAIR HFA/PROVENTIL HFA/VENTOLIN HFA) 108 (90 Base) MCG/ACT Inhaler Inhale 2 puffs into the lungs daily    Yes Reported, Patient   atorvastatin (LIPITOR) 20 MG tablet Take 20 mg by mouth daily   Yes Reported, Patient   azaTHIOprine (IMURAN) 50 MG tablet Take 1 tablet by mouth daily 2/10/20  Yes Reported, Patient   BREO ELLIPTA 200-25 MCG/INH Inhaler INL 1 PUFF PO DAILY. RM AFTER U 6/24/19  Yes Reported, Patient   calcitRIOL (ROCALTROL) 0.25 MCG capsule Take 0.5 mcg by mouth 2 times daily  8/15/16  Yes Reported, Patient   calcium carbonate-vitamin D (CALCIUM 600/VITAMIN D) 600-400 MG-UNIT CHEW Take 4 chew tab by mouth 3 times daily  1/13/1996  Yes Reported, Patient   cholecalciferol (VITAMIN D3) 25 mcg (1000 units) capsule Take 1 capsule by mouth daily   Yes Reported, Patient   colchicine (MITIGARE) 0.6 MG capsule Take 0.6 mg by mouth 2 times daily 1/17/20  Yes Reported, Patient   escitalopram (LEXAPRO) 20 MG tablet Take 20 mg by mouth daily 3/2/18  Yes Reported, Patient   Ferrous Sulfate (IRON) 325 (65 Fe) MG tablet Take 1 tablet by mouth daily  2/10/18  Yes Reported, Patient   hydroxychloroquine (PLAQUENIL) 200 MG tablet Take 200 mg by mouth 2 times daily 1/31/20  Yes Reported, Patient   levothyroxine (SYNTHROID/LEVOTHROID) 200 MCG tablet Take 250 mcg by mouth daily  1/13/1996  Yes Reported, Patient   lisinopril (PRINIVIL/ZESTRIL) 20 MG tablet Take 20 mg by mouth daily 5/1/01  Yes Reported, Patient   metoprolol  succinate ER (TOPROL-XL) 100 MG 24 hr tablet Take 100 mg by mouth 2 times daily 5/10/21  Yes Reported, Patient   montelukast (SINGULAIR) 10 MG tablet Take 10 mg by mouth daily 5/1/1995  Yes Reported, Patient   omeprazole (PRILOSEC) 40 MG DR capsule Take 40 mg by mouth daily 3/26/21  Yes Reported, Patient   potassium chloride ER (KLOR-CON M) 20 MEQ CR tablet Take 2 tablets by mouth 2 times daily as needed Take only when taking Torsemide 4/28/21  Yes Reported, Patient   SPIRIVA RESPIMAT 2.5 MCG/ACT inhaler INL 2 PFS PO QD 12/20/19  Yes Reported, Patient   torsemide (DEMADEX) 20 MG tablet Take 20 mg by mouth daily as needed (edema)  10/4/20  Yes Reported, Patient   vitamin B complex with vitamin C (VITAMIN  B COMPLEX) tablet Take 1 tablet by mouth daily   Yes Reported, Patient   vitamin C (ASCORBIC ACID) 500 MG tablet Take 500 mg by mouth daily 10/22/20  Yes Reported, Patient   warfarin ANTICOAGULANT (COUMADIN) 2 MG tablet Take 2 mg by mouth daily 9/8/21  Yes Reported, Patient       Allergies  Allergies   Allergen Reactions     Dairy Digestive Diarrhea       Review of systems  A 10-point review of systems was negative    Examination  BP (!) 157/85   Pulse 75   Wt 77.2 kg (170 lb 4.8 oz)   SpO2 98%   BMI 29.23 kg/m    Body mass index is 29.23 kg/m .    Gen- well, NAD, A+Ox3, normal color, thyroidectomy scar  CVS- sternotomy, RRR, systolic click, no added sounds  RS- CTA  Abd- soft, non-tender, no ascites or organomegaly on palpation or percussion, BS+  Extr- hands normal, no ALEX  Skin- no rash or jaundice  Neuro- no asterixis  Psych- normal mood    Laboratory  Lab Results   Component Value Date     09/21/2021     06/16/2021    POTASSIUM 3.7 09/21/2021    POTASSIUM 4.2 06/16/2021    CHLORIDE 107 09/21/2021    CHLORIDE 109 06/16/2021    CO2 30 09/21/2021    CO2 30 06/16/2021    BUN 11 09/21/2021    BUN 18 06/16/2021    CR 0.70 09/21/2021    CR 1.10 06/16/2021       Lab Results   Component Value Date     BILITOTAL 0.3 09/21/2021    BILITOTAL 0.6 06/16/2021    ALT 10 09/21/2021    ALT 14 06/16/2021    AST 18 09/21/2021    AST 21 06/16/2021    ALKPHOS 87 09/21/2021    ALKPHOS 65 06/16/2021       Lab Results   Component Value Date    ALBUMIN 2.8 09/21/2021    ALBUMIN 3.1 06/16/2021    PROTTOTAL 6.8 09/21/2021    PROTTOTAL 6.5 06/16/2021        Lab Results   Component Value Date    WBC 3.8 09/21/2021    WBC 3.8 06/16/2021    HGB 9.6 09/21/2021    HGB 11.1 06/16/2021    MCV 85 09/21/2021    MCV 90 06/16/2021     09/21/2021     06/16/2021       Lab Results   Component Value Date    INR 1.26 09/21/2021    INR 1.03 06/16/2021       Radiology  Nil recent    Assessment  66 year old female who presents for routine follow-up of compensated cryptogenic cirrhosis.  MELD-Na= 9 (stable).  No evidence of ascites or hepatic encephalopathy.  Patient has made a good recovery following her recent cardiac surgery.  Due now for upper endoscopy for surveillance of esophageal varices.  Up to date with HCC screening.      Plan  1.  EGD with me  2.  Low Na diet  3.  Continue atorvastatin  4.  Follow-up in 6 months    Tang Maddox MD  Hepatology  [facility]    Hepatology follow-up    Follow-up visit for    Subjective:  66 year old female    Patient denies jaundice, lower extremity edema, abdominal distension, lethargy or confusion.    Patient denies melena, hematemesis or hematochezia.    Patient denies fevers, sweats or chills.  Weight stable.        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     Obesity      SLE (systemic lupus erythematosus) (H) 02/2020     Thyroid cancer (H) 1996      Past Surgical History:   Procedure Laterality Date      C TOTAL ABDOM HYSTERECTOMY       CHOLECYSTECTOMY       ESOPHAGOSCOPY, GASTROSCOPY, DUODENOSCOPY (EGD), COMBINED N/A 2/26/2020    Procedure: ESOPHAGOGASTRODUODENOSCOPY, WITH BIOPSY;  Surgeon: Leventhal, Thomas Michael, MD;  [facility]     IR LIVER BIOPSY PERCUTANEOUS  1/21/2020     PERCUTANEOUS BIOPSY LIVER Right 1/21/2020    Procedure: Liver Biopsy @9:00;  Surgeon: Emanuel Archer PA-C;  [facility]: [facility]     SALPINGO-OOPHORECTOMY BILATERAL       SHOULDER SURGERY       THYROIDECTOMY  1996          Medications  Prior to Admission medications    Medication Sig Start Date End Date Taking? Authorizing Provider   albuterol (PROAIR HFA/PROVENTIL HFA/VENTOLIN HFA) 108 (90 Base) MCG/ACT Inhaler Inhale 2 puffs into the lungs daily    Yes Reported, Patient   atorvastatin (LIPITOR) 20 MG tablet Take 20 mg by mouth daily   Yes Reported, Patient   azaTHIOprine (IMURAN) 50 MG tablet Take 1 tablet by mouth daily 2/10/20  Yes Reported, Patient   BREO ELLIPTA 200-25 MCG/INH Inhaler INL 1 PUFF PO DAILY. RM AFTER U 6/24/19  Yes Reported, Patient   calcitRIOL (ROCALTROL) 0.25 MCG capsule Take 0.5 mcg by mouth 2 times daily  8/15/16  Yes Reported, Patient   calcium carbonate-vitamin D (CALCIUM 600/VITAMIN D) 600-400 MG-UNIT CHEW Take 4 chew tab by mouth 3 times daily  1/13/1996  Yes Reported, Patient   cholecalciferol (VITAMIN D3) 25 mcg (1000 units) capsule Take 1 capsule by mouth daily   Yes Reported, Patient   colchicine (MITIGARE) 0.6 MG capsule Take 0.6 mg by mouth 2 times daily 1/17/20  Yes Reported, Patient   escitalopram (LEXAPRO) 20 MG tablet Take 20 mg by mouth daily 3/2/18  Yes Reported, Patient   Ferrous Sulfate (IRON) 325 (65 Fe) MG tablet Take 1 tablet by mouth daily  2/10/18  Yes Reported, Patient   hydroxychloroquine (PLAQUENIL) 200 MG tablet Take 200 mg by mouth 2 times daily 1/31/20  Yes Reported, Patient   levothyroxine (SYNTHROID/LEVOTHROID) 200 MCG tablet Take 250 mcg by mouth daily  1/13/1996  Yes  Reported, Patient   lisinopril (PRINIVIL/ZESTRIL) 20 MG tablet Take 20 mg by mouth daily 5/1/01  Yes Reported, Patient   metoprolol succinate ER (TOPROL-XL) 100 MG 24 hr tablet Take 100 mg by mouth 2 times daily 5/10/21  Yes Reported, Patient   montelukast (SINGULAIR) 10 MG tablet Take 10 mg by mouth daily 5/1/1995  Yes Reported, Patient   omeprazole (PRILOSEC) 40 MG DR capsule Take 40 mg by mouth daily 3/26/21  Yes Reported, Patient   potassium chloride ER (KLOR-CON M) 20 MEQ CR tablet Take 2 tablets by mouth 2 times daily as needed Take only when taking Torsemide 4/28/21  Yes Reported, Patient   SPIRIVA RESPIMAT 2.5 MCG/ACT inhaler INL 2 PFS PO QD 12/20/19  Yes Reported, Patient   torsemide (DEMADEX) 20 MG tablet Take 20 mg by mouth daily as needed (edema)  10/4/20  Yes Reported, Patient   vitamin B complex with vitamin C (VITAMIN  B COMPLEX) tablet Take 1 tablet by mouth daily   Yes Reported, Patient   vitamin C (ASCORBIC ACID) 500 MG tablet Take 500 mg by mouth daily 10/22/20  Yes Reported, Patient   warfarin ANTICOAGULANT (COUMADIN) 2 MG tablet Take 2 mg by mouth daily 9/8/21  Yes Reported, Patient       Allergies  Allergies   Allergen Reactions     Dairy Digestive Diarrhea       Review of systems  A 10-point review of systems was negative    Examination  BP (!) 157/85   Pulse 75   Wt 77.2 kg (170 lb 4.8 oz)   SpO2 98%   BMI 29.23 kg/m    Body mass index is 29.23 kg/m .    Gen- well, NAD, A+Ox3, normal color  Lym- no palpable LAD  CVS- sternotomy scar, RRR, systolic click, no radiation to carotids  RS- CTA  Abd-   Extr- hands normal, no ALEX  Skin- no rash or jaundice  Neuro- no asterixis  Psych- normal mood    Laboratory  Lab Results   Component Value Date     09/21/2021     06/16/2021    POTASSIUM 3.7 09/21/2021    POTASSIUM 4.2 06/16/2021    CHLORIDE 107 09/21/2021    CHLORIDE 109 06/16/2021    CO2 30 09/21/2021    CO2 30 06/16/2021    BUN 11 09/21/2021    BUN 18 06/16/2021    CR 0.70  09/21/2021    CR 1.10 06/16/2021       Lab Results   Component Value Date    BILITOTAL 0.3 09/21/2021    BILITOTAL 0.6 06/16/2021    ALT 10 09/21/2021    ALT 14 06/16/2021    AST 18 09/21/2021    AST 21 06/16/2021    ALKPHOS 87 09/21/2021    ALKPHOS 65 06/16/2021       Lab Results   Component Value Date    ALBUMIN 2.8 09/21/2021    ALBUMIN 3.1 06/16/2021    PROTTOTAL 6.8 09/21/2021    PROTTOTAL 6.5 06/16/2021        Lab Results   Component Value Date    WBC 3.8 09/21/2021    WBC 3.8 06/16/2021    HGB 9.6 09/21/2021    HGB 11.1 06/16/2021    MCV 85 09/21/2021    MCV 90 06/16/2021     09/21/2021     06/16/2021       Lab Results   Component Value Date    INR 1.26 09/21/2021    INR 1.03 06/16/2021       Radiology    Assessment  66 year old female    Plan        Tang Maddox MD  Hepatology  [facility]      Again, thank you for allowing me to participate in the care of your patient.        Sincerely,        Tang Maddox MD

## 2021-09-21 NOTE — PROGRESS NOTES
[facility]    Hepatology follow-up    Follow-up visit for cirrhosis    Subjective:  66 year old female    Cirrhosis  - dx Jan 2020  - cryptogenic/?AIH/BRITO  - liver bx 1/21/2020  - no hx ascites, HE or variceal bleed  - last EGD Feb 2020- diminutive EV  - HCC screening- abd U/S 6/16/2021    Last visit Jun 2021.  Patient underwent mechanical valve replacement in July that needed to be revised to bioprosthetic valve (with root enlargement) due to abnormal leaflet mobility and restricted central fulcrum.  Patient was started on warfarin.    Patient is well today.  She has made a good functional recovery since surgery- her previous dyspnea on exertion has resolved and she continues to attend cardiac rehab.  Patient has no symptoms related to liver disease.     Patient denies jaundice, lower extremity edema, abdominal distension, lethargy or confusion.    Patient denies melena, hematemesis or hematochezia.    Patient denies fevers, sweats or chills.      Weight is gradually decreasing due to intentional weight loss.  She now weighs 170lbs.    Patient has not drank alcohol since last visit.      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     Obesity      SLE (systemic lupus erythematosus) (H) 02/2020     Thyroid cancer (H) 1996      Past Surgical History:   Procedure Laterality Date     C TOTAL ABDOM HYSTERECTOMY       CHOLECYSTECTOMY       ESOPHAGOSCOPY, GASTROSCOPY, DUODENOSCOPY (EGD), COMBINED N/A 2/26/2020    Procedure: ESOPHAGOGASTRODUODENOSCOPY, WITH BIOPSY;  Surgeon: Leventhal, Thomas Michael, MD;  [facility]     IR LIVER BIOPSY PERCUTANEOUS  1/21/2020     PERCUTANEOUS BIOPSY LIVER Right 1/21/2020    Procedure: Liver Biopsy @9:00;  Surgeon: Emanuel Archer,  ESTER;  Location: [facility] OR     SALPINGO-OOPHORECTOMY BILATERAL       SHOULDER SURGERY       THYROIDECTOMY  1996          Medications  Prior to Admission medications    Medication Sig Start Date End Date Taking? Authorizing Provider   albuterol (PROAIR HFA/PROVENTIL HFA/VENTOLIN HFA) 108 (90 Base) MCG/ACT Inhaler Inhale 2 puffs into the lungs daily    Yes Reported, Patient   atorvastatin (LIPITOR) 20 MG tablet Take 20 mg by mouth daily   Yes Reported, Patient   azaTHIOprine (IMURAN) 50 MG tablet Take 1 tablet by mouth daily 2/10/20  Yes Reported, Patient   BREO ELLIPTA 200-25 MCG/INH Inhaler INL 1 PUFF PO DAILY. RM AFTER U 6/24/19  Yes Reported, Patient   calcitRIOL (ROCALTROL) 0.25 MCG capsule Take 0.5 mcg by mouth 2 times daily  8/15/16  Yes Reported, Patient   calcium carbonate-vitamin D (CALCIUM 600/VITAMIN D) 600-400 MG-UNIT CHEW Take 4 chew tab by mouth 3 times daily  1/13/1996  Yes Reported, Patient   cholecalciferol (VITAMIN D3) 25 mcg (1000 units) capsule Take 1 capsule by mouth daily   Yes Reported, Patient   colchicine (MITIGARE) 0.6 MG capsule Take 0.6 mg by mouth 2 times daily 1/17/20  Yes Reported, Patient   escitalopram (LEXAPRO) 20 MG tablet Take 20 mg by mouth daily 3/2/18  Yes Reported, Patient   Ferrous Sulfate (IRON) 325 (65 Fe) MG tablet Take 1 tablet by mouth daily  2/10/18  Yes Reported, Patient   hydroxychloroquine (PLAQUENIL) 200 MG tablet Take 200 mg by mouth 2 times daily 1/31/20  Yes Reported, Patient   levothyroxine (SYNTHROID/LEVOTHROID) 200 MCG tablet Take 250 mcg by mouth daily  1/13/1996  Yes Reported, Patient   lisinopril (PRINIVIL/ZESTRIL) 20 MG tablet Take 20 mg by mouth daily 5/1/01  Yes Reported, Patient   metoprolol succinate ER (TOPROL-XL) 100 MG 24 hr tablet Take 100 mg by mouth 2 times daily 5/10/21  Yes Reported, Patient   montelukast (SINGULAIR) 10 MG tablet Take 10 mg by mouth daily 5/1/1995  Yes Reported, Patient   omeprazole (PRILOSEC) 40 MG DR capsule Take 40 mg by mouth  daily 3/26/21  Yes Reported, Patient   potassium chloride ER (KLOR-CON M) 20 MEQ CR tablet Take 2 tablets by mouth 2 times daily as needed Take only when taking Torsemide 4/28/21  Yes Reported, Patient   SPIRIVA RESPIMAT 2.5 MCG/ACT inhaler INL 2 PFS PO QD 12/20/19  Yes Reported, Patient   torsemide (DEMADEX) 20 MG tablet Take 20 mg by mouth daily as needed (edema)  10/4/20  Yes Reported, Patient   vitamin B complex with vitamin C (VITAMIN  B COMPLEX) tablet Take 1 tablet by mouth daily   Yes Reported, Patient   vitamin C (ASCORBIC ACID) 500 MG tablet Take 500 mg by mouth daily 10/22/20  Yes Reported, Patient   warfarin ANTICOAGULANT (COUMADIN) 2 MG tablet Take 2 mg by mouth daily 9/8/21  Yes Reported, Patient       Allergies  Allergies   Allergen Reactions     Dairy Digestive Diarrhea       Review of systems  A 10-point review of systems was negative    Examination  BP (!) 157/85   Pulse 75   Wt 77.2 kg (170 lb 4.8 oz)   SpO2 98%   BMI 29.23 kg/m    Body mass index is 29.23 kg/m .    Gen- well, NAD, A+Ox3, normal color, thyroidectomy scar  CVS- sternotomy, RRR, systolic click, no added sounds  RS- CTA  Abd- soft, non-tender, no ascites or organomegaly on palpation or percussion, BS+  Extr- hands normal, no ALEX  Skin- no rash or jaundice  Neuro- no asterixis  Psych- normal mood    Laboratory  Lab Results   Component Value Date     09/21/2021     06/16/2021    POTASSIUM 3.7 09/21/2021    POTASSIUM 4.2 06/16/2021    CHLORIDE 107 09/21/2021    CHLORIDE 109 06/16/2021    CO2 30 09/21/2021    CO2 30 06/16/2021    BUN 11 09/21/2021    BUN 18 06/16/2021    CR 0.70 09/21/2021    CR 1.10 06/16/2021       Lab Results   Component Value Date    BILITOTAL 0.3 09/21/2021    BILITOTAL 0.6 06/16/2021    ALT 10 09/21/2021    ALT 14 06/16/2021    AST 18 09/21/2021    AST 21 06/16/2021    ALKPHOS 87 09/21/2021    ALKPHOS 65 06/16/2021       Lab Results   Component Value Date    ALBUMIN 2.8 09/21/2021    ALBUMIN 3.1  06/16/2021    PROTTOTAL 6.8 09/21/2021    PROTTOTAL 6.5 06/16/2021        Lab Results   Component Value Date    WBC 3.8 09/21/2021    WBC 3.8 06/16/2021    HGB 9.6 09/21/2021    HGB 11.1 06/16/2021    MCV 85 09/21/2021    MCV 90 06/16/2021     09/21/2021     06/16/2021       Lab Results   Component Value Date    INR 1.26 09/21/2021    INR 1.03 06/16/2021       Radiology  Nil recent    Assessment  66 year old female who presents for routine follow-up of compensated cryptogenic cirrhosis.  MELD-Na= 9 (stable).  No evidence of ascites or hepatic encephalopathy.  Patient has made a good recovery following her recent cardiac surgery.  Due now for upper endoscopy for surveillance of esophageal varices.  Up to date with HCC screening.      Plan  1.  EGD with me  2.  Low Na diet  3.  Continue atorvastatin  4.  Follow-up in 6 months    Tang Maddox MD  Hepatology  [facility]

## 2021-09-21 NOTE — PROGRESS NOTES
[facility]    Hepatology follow-up    Follow-up visit for    Subjective:  66 year old female    Patient denies jaundice, lower extremity edema, abdominal distension, lethargy or confusion.    Patient denies melena, hematemesis or hematochezia.    Patient denies fevers, sweats or chills.  Weight stable.        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     Obesity      SLE (systemic lupus erythematosus) (H) 02/2020     Thyroid cancer (H) 1996      Past Surgical History:   Procedure Laterality Date     C TOTAL ABDOM HYSTERECTOMY       CHOLECYSTECTOMY       ESOPHAGOSCOPY, GASTROSCOPY, DUODENOSCOPY (EGD), COMBINED N/A 2/26/2020    Procedure: ESOPHAGOGASTRODUODENOSCOPY, WITH BIOPSY;  Surgeon: Leventhal, Thomas Michael, MD;  [facility]     IR LIVER BIOPSY PERCUTANEOUS  1/21/2020     PERCUTANEOUS BIOPSY LIVER Right 1/21/2020    Procedure: Liver Biopsy @9:00;  Surgeon: Emanuel Archer PA-C;  [facility]     SALPINGO-OOPHORECTOMY BILATERAL       SHOULDER SURGERY       THYROIDECTOMY  1996          Medications  Prior to Admission medications    Medication Sig Start Date End Date Taking? Authorizing Provider   albuterol (PROAIR HFA/PROVENTIL HFA/VENTOLIN HFA) 108 (90 Base) MCG/ACT Inhaler Inhale 2 puffs into the lungs daily    Yes Reported, Patient   atorvastatin (LIPITOR) 20 MG tablet Take 20 mg by mouth daily   Yes Reported, Patient   azaTHIOprine (IMURAN) 50 MG tablet Take 1 tablet by mouth daily 2/10/20  Yes Reported, Patient   BREO ELLIPTA 200-25 MCG/INH Inhaler INL 1 PUFF PO DAILY. RM AFTER U 6/24/19  Yes Reported, Patient   calcitRIOL (ROCALTROL) 0.25 MCG capsule Take 0.5 mcg by mouth 2 times daily  8/15/16  Yes Reported, Patient   calcium carbonate-vitamin D  (CALCIUM 600/VITAMIN D) 600-400 MG-UNIT CHEW Take 4 chew tab by mouth 3 times daily  1/13/1996  Yes Reported, Patient   cholecalciferol (VITAMIN D3) 25 mcg (1000 units) capsule Take 1 capsule by mouth daily   Yes Reported, Patient   colchicine (MITIGARE) 0.6 MG capsule Take 0.6 mg by mouth 2 times daily 1/17/20  Yes Reported, Patient   escitalopram (LEXAPRO) 20 MG tablet Take 20 mg by mouth daily 3/2/18  Yes Reported, Patient   Ferrous Sulfate (IRON) 325 (65 Fe) MG tablet Take 1 tablet by mouth daily  2/10/18  Yes Reported, Patient   hydroxychloroquine (PLAQUENIL) 200 MG tablet Take 200 mg by mouth 2 times daily 1/31/20  Yes Reported, Patient   levothyroxine (SYNTHROID/LEVOTHROID) 200 MCG tablet Take 250 mcg by mouth daily  1/13/1996  Yes Reported, Patient   lisinopril (PRINIVIL/ZESTRIL) 20 MG tablet Take 20 mg by mouth daily 5/1/01  Yes Reported, Patient   metoprolol succinate ER (TOPROL-XL) 100 MG 24 hr tablet Take 100 mg by mouth 2 times daily 5/10/21  Yes Reported, Patient   montelukast (SINGULAIR) 10 MG tablet Take 10 mg by mouth daily 5/1/1995  Yes Reported, Patient   omeprazole (PRILOSEC) 40 MG DR capsule Take 40 mg by mouth daily 3/26/21  Yes Reported, Patient   potassium chloride ER (KLOR-CON M) 20 MEQ CR tablet Take 2 tablets by mouth 2 times daily as needed Take only when taking Torsemide 4/28/21  Yes Reported, Patient   SPIRIVA RESPIMAT 2.5 MCG/ACT inhaler INL 2 PFS PO QD 12/20/19  Yes Reported, Patient   torsemide (DEMADEX) 20 MG tablet Take 20 mg by mouth daily as needed (edema)  10/4/20  Yes Reported, Patient   vitamin B complex with vitamin C (VITAMIN  B COMPLEX) tablet Take 1 tablet by mouth daily   Yes Reported, Patient   vitamin C (ASCORBIC ACID) 500 MG tablet Take 500 mg by mouth daily 10/22/20  Yes Reported, Patient   warfarin ANTICOAGULANT (COUMADIN) 2 MG tablet Take 2 mg by mouth daily 9/8/21  Yes Reported, Patient       Allergies  Allergies   Allergen Reactions     Dairy Digestive Diarrhea        Review of systems  A 10-point review of systems was negative    Examination  BP (!) 157/85   Pulse 75   Wt 77.2 kg (170 lb 4.8 oz)   SpO2 98%   BMI 29.23 kg/m    Body mass index is 29.23 kg/m .    Gen- well, NAD, A+Ox3, normal color  Lym- no palpable LAD  CVS- sternotomy scar, RRR, systolic click, no radiation to carotids  RS- CTA  Abd-   Extr- hands normal, no ALEX  Skin- no rash or jaundice  Neuro- no asterixis  Psych- normal mood    Laboratory  Lab Results   Component Value Date     09/21/2021     06/16/2021    POTASSIUM 3.7 09/21/2021    POTASSIUM 4.2 06/16/2021    CHLORIDE 107 09/21/2021    CHLORIDE 109 06/16/2021    CO2 30 09/21/2021    CO2 30 06/16/2021    BUN 11 09/21/2021    BUN 18 06/16/2021    CR 0.70 09/21/2021    CR 1.10 06/16/2021       Lab Results   Component Value Date    BILITOTAL 0.3 09/21/2021    BILITOTAL 0.6 06/16/2021    ALT 10 09/21/2021    ALT 14 06/16/2021    AST 18 09/21/2021    AST 21 06/16/2021    ALKPHOS 87 09/21/2021    ALKPHOS 65 06/16/2021       Lab Results   Component Value Date    ALBUMIN 2.8 09/21/2021    ALBUMIN 3.1 06/16/2021    PROTTOTAL 6.8 09/21/2021    PROTTOTAL 6.5 06/16/2021        Lab Results   Component Value Date    WBC 3.8 09/21/2021    WBC 3.8 06/16/2021    HGB 9.6 09/21/2021    HGB 11.1 06/16/2021    MCV 85 09/21/2021    MCV 90 06/16/2021     09/21/2021     06/16/2021       Lab Results   Component Value Date    INR 1.26 09/21/2021    INR 1.03 06/16/2021       Radiology    Assessment  66 year old female    Plan        Tang Maddox MD  Hepatology  [facility]

## 2021-09-21 NOTE — NURSING NOTE
Chief Complaint   Patient presents with     RECHECK     3 month f/u     Blood pressure (!) 170/87, pulse 82, weight 77.2 kg (170 lb 4.8 oz), SpO2 98 %.    Halima Song, CMA

## 2021-10-10 ENCOUNTER — HEALTH MAINTENANCE LETTER (OUTPATIENT)
Age: 66
End: 2021-10-10

## 2022-03-17 DIAGNOSIS — K74.60 LIVER CIRRHOSIS SECONDARY TO NASH (H): Primary | ICD-10-CM

## 2022-03-17 DIAGNOSIS — K75.81 LIVER CIRRHOSIS SECONDARY TO NASH (H): Primary | ICD-10-CM

## 2022-03-26 ENCOUNTER — HEALTH MAINTENANCE LETTER (OUTPATIENT)
Age: 67
End: 2022-03-26

## 2022-04-13 ENCOUNTER — LAB (OUTPATIENT)
Dept: LAB | Facility: CLINIC | Age: 67
End: 2022-04-13
Attending: INTERNAL MEDICINE
Payer: COMMERCIAL

## 2022-04-13 ENCOUNTER — ANCILLARY PROCEDURE (OUTPATIENT)
Dept: ULTRASOUND IMAGING | Facility: CLINIC | Age: 67
End: 2022-04-13
Attending: INTERNAL MEDICINE
Payer: COMMERCIAL

## 2022-04-13 DIAGNOSIS — K75.81 LIVER CIRRHOSIS SECONDARY TO NASH (H): ICD-10-CM

## 2022-04-13 DIAGNOSIS — K74.60 LIVER CIRRHOSIS SECONDARY TO NASH (H): ICD-10-CM

## 2022-04-13 LAB
ALBUMIN SERPL-MCNC: 3.7 G/DL (ref 3.4–5)
ALP SERPL-CCNC: 79 U/L (ref 40–150)
ALT SERPL W P-5'-P-CCNC: 13 U/L (ref 0–50)
ANION GAP SERPL CALCULATED.3IONS-SCNC: 8 MMOL/L (ref 3–14)
AST SERPL W P-5'-P-CCNC: 22 U/L (ref 0–45)
BILIRUB DIRECT SERPL-MCNC: 0.4 MG/DL (ref 0–0.2)
BILIRUB SERPL-MCNC: 0.9 MG/DL (ref 0.2–1.3)
BUN SERPL-MCNC: 10 MG/DL (ref 7–30)
CALCIUM SERPL-MCNC: 7.9 MG/DL (ref 8.5–10.1)
CHLORIDE BLD-SCNC: 103 MMOL/L (ref 94–109)
CO2 SERPL-SCNC: 28 MMOL/L (ref 20–32)
CREAT SERPL-MCNC: 0.92 MG/DL (ref 0.52–1.04)
ERYTHROCYTE [DISTWIDTH] IN BLOOD BY AUTOMATED COUNT: 19.9 % (ref 10–15)
GFR SERPL CREATININE-BSD FRML MDRD: 67 ML/MIN/1.73M2
GLUCOSE BLD-MCNC: 107 MG/DL (ref 70–99)
HCT VFR BLD AUTO: 37 % (ref 35–47)
HGB BLD-MCNC: 11.5 G/DL (ref 11.7–15.7)
INR PPP: 1.73 (ref 0.85–1.15)
MCH RBC QN AUTO: 25.5 PG (ref 26.5–33)
MCHC RBC AUTO-ENTMCNC: 31.1 G/DL (ref 31.5–36.5)
MCV RBC AUTO: 82 FL (ref 78–100)
PLATELET # BLD AUTO: 124 10E3/UL (ref 150–450)
POTASSIUM BLD-SCNC: 4 MMOL/L (ref 3.4–5.3)
PROT SERPL-MCNC: 7.5 G/DL (ref 6.8–8.8)
RBC # BLD AUTO: 4.51 10E6/UL (ref 3.8–5.2)
SODIUM SERPL-SCNC: 139 MMOL/L (ref 133–144)
WBC # BLD AUTO: 4.5 10E3/UL (ref 4–11)

## 2022-04-13 PROCEDURE — 36415 COLL VENOUS BLD VENIPUNCTURE: CPT | Performed by: PATHOLOGY

## 2022-04-13 PROCEDURE — 85610 PROTHROMBIN TIME: CPT | Performed by: PATHOLOGY

## 2022-04-13 PROCEDURE — 76700 US EXAM ABDOM COMPLETE: CPT | Performed by: RADIOLOGY

## 2022-04-13 PROCEDURE — 82248 BILIRUBIN DIRECT: CPT | Performed by: PATHOLOGY

## 2022-04-13 PROCEDURE — 80053 COMPREHEN METABOLIC PANEL: CPT | Performed by: PATHOLOGY

## 2022-04-13 PROCEDURE — 85027 COMPLETE CBC AUTOMATED: CPT | Performed by: PATHOLOGY

## 2022-04-27 ENCOUNTER — TELEPHONE (OUTPATIENT)
Dept: GASTROENTEROLOGY | Facility: CLINIC | Age: 67
End: 2022-04-27
Payer: COMMERCIAL

## 2022-05-02 ENCOUNTER — TELEPHONE (OUTPATIENT)
Dept: GASTROENTEROLOGY | Facility: CLINIC | Age: 67
End: 2022-05-02
Payer: COMMERCIAL

## 2022-05-02 ENCOUNTER — OFFICE VISIT (OUTPATIENT)
Dept: GASTROENTEROLOGY | Facility: CLINIC | Age: 67
End: 2022-05-02
Attending: INTERNAL MEDICINE
Payer: COMMERCIAL

## 2022-05-02 VITALS
HEART RATE: 59 BPM | TEMPERATURE: 98.1 F | OXYGEN SATURATION: 98 % | WEIGHT: 188 LBS | DIASTOLIC BLOOD PRESSURE: 83 MMHG | SYSTOLIC BLOOD PRESSURE: 154 MMHG | BODY MASS INDEX: 32.27 KG/M2 | RESPIRATION RATE: 18 BRPM

## 2022-05-02 DIAGNOSIS — K74.60 LIVER CIRRHOSIS SECONDARY TO NASH (H): Primary | ICD-10-CM

## 2022-05-02 DIAGNOSIS — K75.81 LIVER CIRRHOSIS SECONDARY TO NASH (H): Primary | ICD-10-CM

## 2022-05-02 PROCEDURE — G0463 HOSPITAL OUTPT CLINIC VISIT: HCPCS

## 2022-05-02 PROCEDURE — 99213 OFFICE O/P EST LOW 20 MIN: CPT | Performed by: INTERNAL MEDICINE

## 2022-05-02 ASSESSMENT — PAIN SCALES - GENERAL: PAINLEVEL: NO PAIN (0)

## 2022-05-02 NOTE — TELEPHONE ENCOUNTER
Screening Questions    BlueKIND OF PREP RedLOCATION [review exclusion criteria] GreenSEDATION TYPE      1. Are you active on mychart? Y    2. What insurance is in the chart? MEDICA      3.   Ordering/Referring Provider: Tang Holloway MD      4. BMI   (If greater than 40 review exclusion criteria [PAC APPT IF [MAC] @ [facility])  32.3  [If yes, BMI OVER 40-EXTENDED PREP]      **(Sedation review/consideration needed)**  Do you have a legal guardian or Medical Power of    and/or are you able to give consent for your medical care?     SELF     5. Have you had a positive covid test in the last 90 days?   N     6.  Are you currently on dialysis?   N [ If yes, G-PREP & HOSPITAL setting ONLY]     7.  Do you have chronic kidney disease?  N [ If yes, G-PREP ]    8.   Do you have a diagnosis of diabetes?   N   [ If yes, G-PREP ]    9.  On a regular basis do you go 3-5 days between bowel movements?   N   [ If yes, EXTENDED PREP]    10.  Are you taking any prescription pain medications on a routine schedule?    N  [ If yes, EXTENDED PREP] [If yes, MAC]      11.   Do you have any chemical dependencies such as alcohol, street drugs, or methadone?    N [If yes, MAC]    12.   Do you have any history of post-traumatic stress syndrome, severe anxiety or history of psychosis?    N  [If yes, MAC]    13.  [FEMALES] Are you currently pregnant?     If yes, how many weeks?       Respiratory/Heart Screening:  [If yes to any of the following HOSPITAL setting only]     14. Do you have Pulmonary Hypertension [Lungs]?   N       15. Do you have UNCONTROLLED asthma?   N     16.  Do you use daily home oxygen?  N      17. Do you have mod to severe Obstructive Sleep Apnea?         (OKAY @ [facility]  [facility]  SH  PH  RI  MG - if pt is not on OXYGEN)  N      18.   Have you had a heart or lung transplant?   N      19.   Have you had a stroke or Transient ischemic attack (TIA - aka  mini stroke ) within 6 months?  (If yes, please review  exclusion criteria)  N     20.   In the past 6 months, have you had any heart related issues including cardiomyopathy or heart attack?   N           If yes, did it require cardiac stenting or other implantable device?   N      21.   Do you have any implantable devices in your body (pacemaker, defib, LVAD)? (If yes, please review exclusion criteria)  N     22. Do you take nitroglycerin?   N           If yes, how often? N  (if yes, HOSPITAL setting ONLY)    23.  Are you currently taking any blood thinners?    [If yes, INFORM patient to follw up w/ ORDERING PROVIDER FOR BRIDGING INSTRUCTIONS]     Y - BLOOD THINNER     24.   Do you transfer independently?                (If NO, please HOSPITAL setting ONLY)  Y    25.   Preferred LOCAL Pharmacy for Pre Prescription:      [id number] - [place], [place] - [address] AT [facility] [address]     Scheduling Details  (Please ask for phone number if not scheduled by patient)      Caller : Geri Zamorano   Date of Procedure: 07/14/2022  Surgeon: HERRERA  Location: [facility]; [address] [place], [place] [id number]      Sedation Type: CS  l PER PROTOCOL   Conscious Sedation- Needs  for 6 hours after the procedure  MAC/General-Needs  for 24 hours after procedure    N :[Pre-op Required] at [facility]  SH  MG and OR for MAC sedation   (advise patient they will need a pre-op WITH IN 30 DAYS of procedure date)     Type of Procedure Scheduled:   Upper Endoscopy [EGD]    Which Colonoscopy Prep was Sent?:   - - -    [facility] PATIENTS & [facility] PATIENTS NEEDS EXTENDED PREP       Informed patient they will need an adult  - Y  Cannot take any type of public or medical transportation alone    Pre-Procedure [facility] or Externally: Y    Confirmed Nurse will call to complete assessment Y    Additional comments: N

## 2022-05-02 NOTE — PROGRESS NOTES
[facility] Hepatology    Follow-up Visit    Follow-up visit for    Subjective:  67 year old female    Cirrhosis  - dx Jan 2020  - cryptogenic/?AIH/BRITO  - liver bx 1/21/2020  - no hx ascites, HE or variceal bleed  - last EGD Feb 2020- diminutive EV  - HCC screening- abd U/S 6/16/2021    Last visit Sep 2021.  Patient was in her local ER with musculoskeletal pain in Jan 2022.  She was admitted to hospital overnight with hypocalcemia in Mar 2022 after she ran out of calcitriol.  Patient denies any new medications since last visit.    Patient is doing OK today.  She has some mild dyspnea as she skipped her torsemide to drive to this appointment.  Patient denies any symptoms related to liver disease.    Patient denies jaundice, lower extremity edema, abdominal distension, lethargy or confusion.    Patient denies melena, hematemesis or hematochezia.    Patient denies fevers, sweats or chills.  Patient is vaccinated and boosted against COVID-19.    Weight fluctuates with volume status.  Patient has an appointment with her cardiologist at [facility] later this month.  Appetite is ok.    Patient drinks alcohol rarely.  She had a armani for her birthday at the weekend.        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     Obesity      SLE (systemic lupus erythematosus) (H) 02/2020     Thyroid cancer (H) 1996      Past Surgical History:   Procedure Laterality Date     CHOLECYSTECTOMY       ESOPHAGOSCOPY, GASTROSCOPY, DUODENOSCOPY (EGD), COMBINED N/A 2/26/2020    Procedure: ESOPHAGOGASTRODUODENOSCOPY, WITH BIOPSY;  Surgeon: Leventhal, Thomas Michael, MD;  [facility]     IR LIVER BIOPSY PERCUTANEOUS  1/21/2020     PERCUTANEOUS BIOPSY LIVER Right 1/21/2020    Procedure: Liver Biopsy @9:00;  Surgeon:  Emanuel Archer PA-C;  [facility] OR     SALPINGO-OOPHORECTOMY BILATERAL       SHOULDER SURGERY       THYROIDECTOMY  1996     [facility] TOTAL ABDOM HYSTERECTOMY            Medications  Prior to Admission medications    Medication Sig Start Date End Date Taking? Authorizing Provider   albuterol (PROAIR HFA/PROVENTIL HFA/VENTOLIN HFA) 108 (90 Base) MCG/ACT Inhaler Inhale 2 puffs into the lungs daily   Yes Reported, Patient   atorvastatin (LIPITOR) 20 MG tablet Take 20 mg by mouth daily   Yes Reported, Patient   azaTHIOprine (IMURAN) 50 MG tablet Take 1 tablet by mouth daily 2/10/20  Yes Reported, Patient   BREO ELLIPTA 200-25 MCG/INH Inhaler INL 1 PUFF PO DAILY. RM AFTER U 6/24/19  Yes Reported, Patient   calcitRIOL (ROCALTROL) 0.25 MCG capsule Take 0.5 mcg by mouth 2 times daily  8/15/16  Yes Reported, Patient   calcium carbonate-vitamin D (OS-SHAYAN) 600-400 MG-UNIT chewable tablet Take 4 chew tab by mouth 3 times daily  1/13/1996  Yes Reported, Patient   cholecalciferol (VITAMIN D3) 25 mcg (1000 units) capsule Take 1 capsule by mouth daily   Yes Reported, Patient   colchicine (MITIGARE) 0.6 MG capsule Take 0.6 mg by mouth 2 times daily 1/17/20  Yes Reported, Patient   escitalopram (LEXAPRO) 20 MG tablet Take 20 mg by mouth daily 3/2/18  Yes Reported, Patient   Ferrous Sulfate (IRON) 325 (65 Fe) MG tablet Take 1 tablet by mouth daily  2/10/18  Yes Reported, Patient   hydroxychloroquine (PLAQUENIL) 200 MG tablet Take 200 mg by mouth 2 times daily 1/31/20  Yes Reported, Patient   levothyroxine (SYNTHROID/LEVOTHROID) 200 MCG tablet Take 250 mcg by mouth daily  1/13/1996  Yes Reported, Patient   lisinopril (PRINIVIL/ZESTRIL) 20 MG tablet Take 20 mg by mouth daily 5/1/01  Yes Reported, Patient   metoprolol succinate ER (TOPROL-XL) 100 MG 24 hr tablet Take 100 mg by mouth 2 times daily 5/10/21  Yes Reported, Patient   montelukast (SINGULAIR) 10 MG tablet Take 10 mg by mouth daily 5/1/1995  Yes Reported, Patient    omeprazole (PRILOSEC) 40 MG DR capsule Take 40 mg by mouth daily 3/26/21  Yes Reported, Patient   potassium chloride ER (KLOR-CON M) 20 MEQ CR tablet Take 2 tablets by mouth 2 times daily as needed Take only when taking Torsemide 4/28/21  Yes Reported, Patient   SPIRIVA RESPIMAT 2.5 MCG/ACT inhaler INL 2 PFS PO QD 12/20/19  Yes Reported, Patient   torsemide (DEMADEX) 20 MG tablet Take 20 mg by mouth daily as needed (edema)  10/4/20  Yes Reported, Patient   vitamin C (ASCORBIC ACID) 500 MG tablet Take 500 mg by mouth daily 10/22/20  Yes Reported, Patient   warfarin ANTICOAGULANT (COUMADIN) 2 MG tablet Take 2 mg by mouth daily 9/8/21  Yes Reported, Patient   vitamin B complex with vitamin C (STRESS TAB) tablet Take 1 tablet by mouth daily  Patient not taking: Reported on 5/2/2022    Reported, Patient       Allergies  Allergies   Allergen Reactions     Dairy Digestive Diarrhea       Review of systems  A 10-point review of systems was negative    Examination  BP (!) 154/83   Pulse 59   Temp 98.1  F (36.7  C)   Resp 18   Wt 85.3 kg (188 lb)   SpO2 98%   BMI 32.27 kg/m    Body mass index is 32.27 kg/m .    Gen- well, NAD, A+Ox3, normal color  CVS- RRR, sternotomy scar, systolic click  RS- CTA  Abd- central obesity, SNT, no ascites or organomegaly on palpation or percussion, BS+  Extr- hands normal, no ALEX  Skin- no rash or jaundice  Neuro- no asterixis  Psych- normal mood    Laboratory  Lab Results   Component Value Date     04/13/2022     06/16/2021    POTASSIUM 4.0 04/13/2022    POTASSIUM 4.2 06/16/2021    CHLORIDE 103 04/13/2022    CHLORIDE 109 06/16/2021    CO2 28 04/13/2022    CO2 30 06/16/2021    BUN 10 04/13/2022    BUN 18 06/16/2021    CR 0.92 04/13/2022    CR 1.10 06/16/2021       Lab Results   Component Value Date    BILITOTAL 0.9 04/13/2022    BILITOTAL 0.6 06/16/2021    ALT 13 04/13/2022    ALT 14 06/16/2021    AST 22 04/13/2022    AST 21 06/16/2021    ALKPHOS 79 04/13/2022    ALKPHOS 65  06/16/2021       Lab Results   Component Value Date    ALBUMIN 3.7 04/13/2022    ALBUMIN 3.1 06/16/2021    PROTTOTAL 7.5 04/13/2022    PROTTOTAL 6.5 06/16/2021        Lab Results   Component Value Date    WBC 4.5 04/13/2022    WBC 3.8 06/16/2021    HGB 11.5 04/13/2022    HGB 11.1 06/16/2021    MCV 82 04/13/2022    MCV 90 06/16/2021     04/13/2022     06/16/2021       Lab Results   Component Value Date    INR 1.73 04/13/2022    INR 1.03 06/16/2021       Radiology  Abd U/S April 2022 personally reviewed- no mass, no ascites    Assessment  67 year old female who presents for follow-up of compensated cryptogenic cirrhosis.  MELD-Na= 12 (stable).  No evidence of ascites or hepatic encephalopathy.  Overdue for surveillance of esophageal varices.  Up to date with HCC screening.    Plan  1.  Low Na diet  2.  Torsemide per cardiology  3.  EGD with me  4.  Follow-up in 6 months    Tang Maddox MD  Hepatology  [facility]

## 2022-05-02 NOTE — NURSING NOTE
Chief Complaint   Patient presents with     Consult     New Patient Consult     BP (!) 154/83   Pulse 59   Temp 98.1  F (36.7  C)   Resp 18   Wt 85.3 kg (188 lb)   SpO2 98%   BMI 32.27 kg/m    Brown Bird on 5/2/2022 at 2:36 PM

## 2022-05-02 NOTE — LETTER
5/2/2022         RE: Geri Zamorano  [address] [place] [address]      [facility]    Follow-up Visit    Follow-up visit for    Subjective:  67 year old female    Cirrhosis  - dx Jan 2020  - cryptogenic/?AIH/BRITO  - liver bx 1/21/2020  - no hx ascites, HE or variceal bleed  - last EGD Feb 2020- diminutive EV  - HCC screening- abd U/S 6/16/2021    Last visit Sep 2021.  Patient was in her local ER with musculoskeletal pain in Jan 2022.  She was admitted to hospital overnight with hypocalcemia in Mar 2022 after she ran out of calcitriol.  Patient denies any new medications since last visit.    Patient is doing OK today.  She has some mild dyspnea as she skipped her torsemide to drive to this appointment.  Patient denies any symptoms related to liver disease.    Patient denies jaundice, lower extremity edema, abdominal distension, lethargy or confusion.    Patient denies melena, hematemesis or hematochezia.    Patient denies fevers, sweats or chills.  Patient is vaccinated and boosted against COVID-19.    Weight fluctuates with volume status.  Patient has an appointment with her cardiologist at [facility] later this month.  Appetite is ok.    Patient drinks alcohol rarely.  She had a armani for her birthday at the weekend.        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     Obesity      SLE (systemic lupus erythematosus) (H) 02/2020     Thyroid cancer (H) 1996      Past Surgical History:   Procedure Laterality Date     CHOLECYSTECTOMY       ESOPHAGOSCOPY, GASTROSCOPY, DUODENOSCOPY (EGD), COMBINED N/A 2/26/2020    Procedure: ESOPHAGOGASTRODUODENOSCOPY, WITH BIOPSY;  Surgeon: Leventhal, Thomas Michael, MD;  [facility]: [facility] OR     IR LIVER BIOPSY PERCUTANEOUS  1/21/2020      PERCUTANEOUS BIOPSY LIVER Right 1/21/2020    Procedure: Liver Biopsy @9:00;  Surgeon: Emanuel Archer PA-C;  Location: [facility] OR     SALPINGO-OOPHORECTOMY BILATERAL       SHOULDER SURGERY       THYROIDECTOMY  1996     [facility] TOTAL ABDOM HYSTERECTOMY            Medications  Prior to Admission medications    Medication Sig Start Date End Date Taking? Authorizing Provider   albuterol (PROAIR HFA/PROVENTIL HFA/VENTOLIN HFA) 108 (90 Base) MCG/ACT Inhaler Inhale 2 puffs into the lungs daily   Yes Reported, Patient   atorvastatin (LIPITOR) 20 MG tablet Take 20 mg by mouth daily   Yes Reported, Patient   azaTHIOprine (IMURAN) 50 MG tablet Take 1 tablet by mouth daily 2/10/20  Yes Reported, Patient   BREO ELLIPTA 200-25 MCG/INH Inhaler INL 1 PUFF PO DAILY. RM AFTER U 6/24/19  Yes Reported, Patient   calcitRIOL (ROCALTROL) 0.25 MCG capsule Take 0.5 mcg by mouth 2 times daily  8/15/16  Yes Reported, Patient   calcium carbonate-vitamin D (OS-SHAYAN) 600-400 MG-UNIT chewable tablet Take 4 chew tab by mouth 3 times daily  1/13/1996  Yes Reported, Patient   cholecalciferol (VITAMIN D3) 25 mcg (1000 units) capsule Take 1 capsule by mouth daily   Yes Reported, Patient   colchicine (MITIGARE) 0.6 MG capsule Take 0.6 mg by mouth 2 times daily 1/17/20  Yes Reported, Patient   escitalopram (LEXAPRO) 20 MG tablet Take 20 mg by mouth daily 3/2/18  Yes Reported, Patient   Ferrous Sulfate (IRON) 325 (65 Fe) MG tablet Take 1 tablet by mouth daily  2/10/18  Yes Reported, Patient   hydroxychloroquine (PLAQUENIL) 200 MG tablet Take 200 mg by mouth 2 times daily 1/31/20  Yes Reported, Patient   levothyroxine (SYNTHROID/LEVOTHROID) 200 MCG tablet Take 250 mcg by mouth daily  1/13/1996  Yes Reported, Patient   lisinopril (PRINIVIL/ZESTRIL) 20 MG tablet Take 20 mg by mouth daily 5/1/01  Yes Reported, Patient   metoprolol succinate ER (TOPROL-XL) 100 MG 24 hr tablet Take 100 mg by mouth 2 times daily 5/10/21  Yes Reported, Patient   montelukast  (SINGULAIR) 10 MG tablet Take 10 mg by mouth daily 5/1/1995  Yes Reported, Patient   omeprazole (PRILOSEC) 40 MG DR capsule Take 40 mg by mouth daily 3/26/21  Yes Reported, Patient   potassium chloride ER (KLOR-CON M) 20 MEQ CR tablet Take 2 tablets by mouth 2 times daily as needed Take only when taking Torsemide 4/28/21  Yes Reported, Patient   SPIRIVA RESPIMAT 2.5 MCG/ACT inhaler INL 2 PFS PO QD 12/20/19  Yes Reported, Patient   torsemide (DEMADEX) 20 MG tablet Take 20 mg by mouth daily as needed (edema)  10/4/20  Yes Reported, Patient   vitamin C (ASCORBIC ACID) 500 MG tablet Take 500 mg by mouth daily 10/22/20  Yes Reported, Patient   warfarin ANTICOAGULANT (COUMADIN) 2 MG tablet Take 2 mg by mouth daily 9/8/21  Yes Reported, Patient   vitamin B complex with vitamin C (STRESS TAB) tablet Take 1 tablet by mouth daily  Patient not taking: Reported on 5/2/2022    Reported, Patient       Allergies  Allergies   Allergen Reactions     Dairy Digestive Diarrhea       Review of systems  A 10-point review of systems was negative    Examination  BP (!) 154/83   Pulse 59   Temp 98.1  F (36.7  C)   Resp 18   Wt 85.3 kg (188 lb)   SpO2 98%   BMI 32.27 kg/m    Body mass index is 32.27 kg/m .    Gen- well, NAD, A+Ox3, normal color  CVS- RRR, sternotomy scar, systolic click  RS- CTA  Abd- central obesity, SNT, no ascites or organomegaly on palpation or percussion, BS+  Extr- hands normal, no ALEX  Skin- no rash or jaundice  Neuro- no asterixis  Psych- normal mood    Laboratory  Lab Results   Component Value Date     04/13/2022     06/16/2021    POTASSIUM 4.0 04/13/2022    POTASSIUM 4.2 06/16/2021    CHLORIDE 103 04/13/2022    CHLORIDE 109 06/16/2021    CO2 28 04/13/2022    CO2 30 06/16/2021    BUN 10 04/13/2022    BUN 18 06/16/2021    CR 0.92 04/13/2022    CR 1.10 06/16/2021       Lab Results   Component Value Date    BILITOTAL 0.9 04/13/2022    BILITOTAL 0.6 06/16/2021    ALT 13 04/13/2022    ALT 14 06/16/2021     AST 22 04/13/2022    AST 21 06/16/2021    ALKPHOS 79 04/13/2022    ALKPHOS 65 06/16/2021       Lab Results   Component Value Date    ALBUMIN 3.7 04/13/2022    ALBUMIN 3.1 06/16/2021    PROTTOTAL 7.5 04/13/2022    PROTTOTAL 6.5 06/16/2021        Lab Results   Component Value Date    WBC 4.5 04/13/2022    WBC 3.8 06/16/2021    HGB 11.5 04/13/2022    HGB 11.1 06/16/2021    MCV 82 04/13/2022    MCV 90 06/16/2021     04/13/2022     06/16/2021       Lab Results   Component Value Date    INR 1.73 04/13/2022    INR 1.03 06/16/2021       Radiology  Abd U/S April 2022 personally reviewed- no mass, no ascites    Assessment  67 year old female who presents for follow-up of compensated cryptogenic cirrhosis.  MELD-Na= 12 (stable).  No evidence of ascites or hepatic encephalopathy.  Overdue for surveillance of esophageal varices.  Up to date with HCC screening.    Plan  1.  Low Na diet  2.  Torsemide per cardiology  3.  EGD with me  4.  Follow-up in 6 months    Tang Maddox MD  Hepatology  [facility]

## 2022-05-21 ENCOUNTER — HEALTH MAINTENANCE LETTER (OUTPATIENT)
Age: 67
End: 2022-05-21

## 2022-07-07 ENCOUNTER — TELEPHONE (OUTPATIENT)
Dept: GASTROENTEROLOGY | Facility: CLINIC | Age: 67
End: 2022-07-07

## 2022-07-07 NOTE — TELEPHONE ENCOUNTER
Pre assessment questions completed for upcoming EGD procedure scheduled on 7/14/22    COVID policy reviewed. Patient to complete rapid antigen test one to two days before their scheduled procedure. Patient to bring photo of the results when they come in for their procedure.    Reviewed procedural arrival time 1200 and facility location [facility].    Designated  policy reviewed. Instructed to have someone stay 6 hours post procedure.     Procedure indication: varices    Reviewed EGD prep instructions with patient. No fiber/iron supplements or foods that contain nuts/seeds 7 days prior to procedure.     Anticoagulation/blood thinners? Coumadin (Warfarin). Holding interval of 5 days. Patient to consult with outside provider regarding holding interval and bridging if applicable. Patient agreeable to plan.     Patient verbalized understanding and had no questions or concerns at this time.    Georgia Middleton RN

## 2022-07-14 ENCOUNTER — HOSPITAL ENCOUNTER (OUTPATIENT)
Facility: CLINIC | Age: 67
Discharge: HOME | End: 2022-07-14
Attending: INTERNAL MEDICINE | Admitting: INTERNAL MEDICINE
Payer: COMMERCIAL

## 2022-07-14 VITALS
SYSTOLIC BLOOD PRESSURE: 135 MMHG | OXYGEN SATURATION: 94 % | TEMPERATURE: 98.4 F | DIASTOLIC BLOOD PRESSURE: 72 MMHG | HEART RATE: 63 BPM | RESPIRATION RATE: 16 BRPM

## 2022-07-14 PROBLEM — K75.81 LIVER CIRRHOSIS SECONDARY TO NASH (H): Status: ACTIVE | Noted: 2022-07-14

## 2022-07-14 PROBLEM — R94.5 ABNORMAL RESULTS OF LIVER FUNCTION STUDIES: Status: RESOLVED | Noted: 2018-04-17 | Resolved: 2022-07-14

## 2022-07-14 PROBLEM — K74.60 LIVER CIRRHOSIS SECONDARY TO NASH (H): Status: ACTIVE | Noted: 2022-07-14

## 2022-07-14 LAB
INR BLD: 1.2 (ref 2–3)
UPPER GI ENDOSCOPY: NORMAL

## 2022-07-14 PROCEDURE — 250N000009 HC RX 250: Performed by: INTERNAL MEDICINE

## 2022-07-14 PROCEDURE — 999N000099 HC STATISTIC MODERATE SEDATION < 10 MIN: Performed by: INTERNAL MEDICINE

## 2022-07-14 PROCEDURE — 85610 PROTHROMBIN TIME: CPT

## 2022-07-14 PROCEDURE — 43235 EGD DIAGNOSTIC BRUSH WASH: CPT | Performed by: INTERNAL MEDICINE

## 2022-07-14 PROCEDURE — 250N000011 HC RX IP 250 OP 636: Performed by: INTERNAL MEDICINE

## 2022-07-14 RX ORDER — FLUMAZENIL 0.1 MG/ML
0.2 INJECTION, SOLUTION INTRAVENOUS
Status: DISCONTINUED | OUTPATIENT
Start: 2022-07-14 | End: 2022-07-14 | Stop reason: HOSPADM

## 2022-07-14 RX ORDER — NALOXONE HYDROCHLORIDE 0.4 MG/ML
0.2 INJECTION, SOLUTION INTRAMUSCULAR; INTRAVENOUS; SUBCUTANEOUS
Status: DISCONTINUED | OUTPATIENT
Start: 2022-07-14 | End: 2022-07-14 | Stop reason: HOSPADM

## 2022-07-14 RX ORDER — LIDOCAINE 40 MG/G
CREAM TOPICAL
Status: DISCONTINUED | OUTPATIENT
Start: 2022-07-14 | End: 2022-07-14 | Stop reason: HOSPADM

## 2022-07-14 RX ORDER — NALOXONE HYDROCHLORIDE 0.4 MG/ML
0.4 INJECTION, SOLUTION INTRAMUSCULAR; INTRAVENOUS; SUBCUTANEOUS
Status: DISCONTINUED | OUTPATIENT
Start: 2022-07-14 | End: 2022-07-14 | Stop reason: HOSPADM

## 2022-07-14 RX ORDER — PROCHLORPERAZINE MALEATE 5 MG
5 TABLET ORAL EVERY 6 HOURS PRN
Status: DISCONTINUED | OUTPATIENT
Start: 2022-07-14 | End: 2022-07-14 | Stop reason: HOSPADM

## 2022-07-14 RX ORDER — ONDANSETRON 2 MG/ML
4 INJECTION INTRAMUSCULAR; INTRAVENOUS
Status: DISCONTINUED | OUTPATIENT
Start: 2022-07-14 | End: 2022-07-14 | Stop reason: HOSPADM

## 2022-07-14 RX ORDER — ONDANSETRON 4 MG/1
4 TABLET, ORALLY DISINTEGRATING ORAL EVERY 6 HOURS PRN
Status: DISCONTINUED | OUTPATIENT
Start: 2022-07-14 | End: 2022-07-14 | Stop reason: HOSPADM

## 2022-07-14 RX ORDER — FENTANYL CITRATE 50 UG/ML
INJECTION, SOLUTION INTRAMUSCULAR; INTRAVENOUS PRN
Status: COMPLETED | OUTPATIENT
Start: 2022-07-14 | End: 2022-07-14

## 2022-07-14 RX ORDER — ONDANSETRON 2 MG/ML
4 INJECTION INTRAMUSCULAR; INTRAVENOUS EVERY 6 HOURS PRN
Status: DISCONTINUED | OUTPATIENT
Start: 2022-07-14 | End: 2022-07-14 | Stop reason: HOSPADM

## 2022-07-14 RX ADMIN — MIDAZOLAM 2 MG: 1 INJECTION INTRAMUSCULAR; INTRAVENOUS at 13:51

## 2022-07-14 RX ADMIN — FENTANYL CITRATE 100 MCG: 50 INJECTION, SOLUTION INTRAMUSCULAR; INTRAVENOUS at 13:15

## 2022-07-14 RX ADMIN — TOPICAL ANESTHETIC 1 SPRAY: 200 SPRAY DENTAL; PERIODONTAL at 13:13

## 2022-07-14 NOTE — OR NURSING
Pt had EGD under moderate sedation. Pt tolerated procedure very well. No endoscopic interventions. Report given to Halima PERALTA, sent to recovery in stable condition on RA.

## 2022-07-14 NOTE — PROGRESS NOTES
Notified GI that patient left her phone in the car that had her covid test pictured on it-patient states test was negative. Dr Maddox notified and he stated he didn't need a repeat test and patient was ok to proceed without an additional test

## 2022-07-14 NOTE — H&P
Geri Zamorano  [id number]  female  67 year old      Reason for procedure/surgery: varices    Patient Active Problem List   Diagnosis     Abnormal results of liver function studies       Past Surgical History:    Past Surgical History:   Procedure Laterality Date     CHOLECYSTECTOMY       ESOPHAGOSCOPY, GASTROSCOPY, DUODENOSCOPY (EGD), COMBINED N/A 2/26/2020    Procedure: ESOPHAGOGASTRODUODENOSCOPY, WITH BIOPSY;  Surgeon: Leventhal, Thomas Michael, MD;  [facility]: [facility] OR     IR LIVER BIOPSY PERCUTANEOUS  1/21/2020     PERCUTANEOUS BIOPSY LIVER Right 1/21/2020    Procedure: Liver Biopsy @9:00;  Surgeon: Emanuel Archer PA-C;  Location: [facility] OR     SALPINGO-OOPHORECTOMY BILATERAL       SHOULDER SURGERY       THYROIDECTOMY  1996     [facility] TOTAL ABDOM HYSTERECTOMY         Past Medical History:   Past Medical History:   Diagnosis Date     Aortic stenosis 10/2020     Asthma      Depression with anxiety      GERD (gastroesophageal reflux disease)      Hyperlipidemia      Hypertension      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) 01/2020     Obesity      SLE (systemic lupus erythematosus) (H) 02/2020     Thyroid cancer (H) 1996       Social History:   Social History     Tobacco Use     Smoking status: Never Smoker     Smokeless tobacco: Never Used   Substance Use Topics     Alcohol use: Yes     Comment: occasionally       Family History:   Family History   Problem Relation Age of Onset     Breast Cancer Mother      Coronary Artery Disease Father      Colon Cancer Brother 50     Liver Disease No family hx of      Rheumatoid Arthritis No family hx of        Allergies:   Allergies   Allergen Reactions     Dairy Digestive Diarrhea       Active Medications:   No current outpatient medications on file.       Systemic Review:   CONSTITUTIONAL: NEGATIVE for fever, chills, change in weight  ENT/MOUTH: NEGATIVE for ear, mouth and throat problems  RESP: NEGATIVE for significant cough or SOB  CV: NEGATIVE for chest pain, palpitations  or peripheral edema    Physical Examination:   Vital Signs: BP (!) 144/68   Pulse 62   Temp 99.2  F (37.3  C) (Oral)   Resp 16   SpO2 100%   GENERAL: healthy, alert and no distress  NECK: no adenopathy, no asymmetry, masses, or scars  RESP: lungs clear to auscultation - no rales, rhonchi or wheezes  CV: regular rate and rhythm, normal S1 S2, no S3 or S4, no murmur, click or rub, no peripheral edema and peripheral pulses strong  ABDOMEN: soft, nontender, no hepatosplenomegaly, no masses and bowel sounds normal  MS: no gross musculoskeletal defects noted, no edema    Plan: Appropriate to proceed as scheduled.      Tang Maddox MD  7/14/2022    PCP:  Jovanni Sneed

## 2022-09-18 ENCOUNTER — HEALTH MAINTENANCE LETTER (OUTPATIENT)
Age: 67
End: 2022-09-18

## 2022-10-07 DIAGNOSIS — K74.60 LIVER CIRRHOSIS SECONDARY TO NASH (H): Primary | ICD-10-CM

## 2022-10-07 DIAGNOSIS — K75.81 LIVER CIRRHOSIS SECONDARY TO NASH (H): Primary | ICD-10-CM

## 2022-11-01 ENCOUNTER — ANCILLARY PROCEDURE (OUTPATIENT)
Dept: ULTRASOUND IMAGING | Facility: CLINIC | Age: 67
End: 2022-11-01
Attending: INTERNAL MEDICINE
Payer: COMMERCIAL

## 2022-11-01 ENCOUNTER — LAB (OUTPATIENT)
Dept: LAB | Facility: CLINIC | Age: 67
End: 2022-11-01
Payer: COMMERCIAL

## 2022-11-01 ENCOUNTER — OFFICE VISIT (OUTPATIENT)
Dept: GASTROENTEROLOGY | Facility: CLINIC | Age: 67
End: 2022-11-01
Attending: INTERNAL MEDICINE
Payer: COMMERCIAL

## 2022-11-01 VITALS
OXYGEN SATURATION: 98 % | SYSTOLIC BLOOD PRESSURE: 186 MMHG | HEART RATE: 68 BPM | DIASTOLIC BLOOD PRESSURE: 84 MMHG | BODY MASS INDEX: 30.61 KG/M2 | WEIGHT: 178.3 LBS | TEMPERATURE: 98.3 F

## 2022-11-01 DIAGNOSIS — K74.60 LIVER CIRRHOSIS SECONDARY TO NASH (H): Primary | ICD-10-CM

## 2022-11-01 DIAGNOSIS — K75.81 LIVER CIRRHOSIS SECONDARY TO NASH (H): ICD-10-CM

## 2022-11-01 DIAGNOSIS — K74.60 LIVER CIRRHOSIS SECONDARY TO NASH (H): ICD-10-CM

## 2022-11-01 DIAGNOSIS — K75.81 LIVER CIRRHOSIS SECONDARY TO NASH (H): Primary | ICD-10-CM

## 2022-11-01 LAB
ALBUMIN SERPL BCG-MCNC: 4.2 G/DL (ref 3.5–5.2)
ALP SERPL-CCNC: 102 U/L (ref 35–104)
ALT SERPL W P-5'-P-CCNC: 5 U/L (ref 10–35)
ANION GAP SERPL CALCULATED.3IONS-SCNC: 11 MMOL/L (ref 7–15)
AST SERPL W P-5'-P-CCNC: 27 U/L (ref 10–35)
BILIRUB DIRECT SERPL-MCNC: <0.2 MG/DL (ref 0–0.3)
BILIRUB SERPL-MCNC: 0.5 MG/DL
BUN SERPL-MCNC: 9.6 MG/DL (ref 8–23)
CALCIUM SERPL-MCNC: 8.8 MG/DL (ref 8.8–10.2)
CHLORIDE SERPL-SCNC: 101 MMOL/L (ref 98–107)
CREAT SERPL-MCNC: 0.85 MG/DL (ref 0.51–0.95)
DEPRECATED HCO3 PLAS-SCNC: 28 MMOL/L (ref 22–29)
ERYTHROCYTE [DISTWIDTH] IN BLOOD BY AUTOMATED COUNT: 16.4 % (ref 10–15)
GFR SERPL CREATININE-BSD FRML MDRD: 74 ML/MIN/1.73M2
GLUCOSE SERPL-MCNC: 108 MG/DL (ref 70–99)
HCT VFR BLD AUTO: 38.9 % (ref 35–47)
HGB BLD-MCNC: 12.4 G/DL (ref 11.7–15.7)
INR PPP: 1.18 (ref 0.85–1.15)
MCH RBC QN AUTO: 26.7 PG (ref 26.5–33)
MCHC RBC AUTO-ENTMCNC: 31.9 G/DL (ref 31.5–36.5)
MCV RBC AUTO: 84 FL (ref 78–100)
PLATELET # BLD AUTO: 131 10E3/UL (ref 150–450)
POTASSIUM SERPL-SCNC: 4 MMOL/L (ref 3.4–5.3)
PROT SERPL-MCNC: 7.8 G/DL (ref 6.4–8.3)
RBC # BLD AUTO: 4.64 10E6/UL (ref 3.8–5.2)
SODIUM SERPL-SCNC: 140 MMOL/L (ref 136–145)
WBC # BLD AUTO: 4.6 10E3/UL (ref 4–11)

## 2022-11-01 PROCEDURE — 36415 COLL VENOUS BLD VENIPUNCTURE: CPT | Performed by: PATHOLOGY

## 2022-11-01 PROCEDURE — 82248 BILIRUBIN DIRECT: CPT | Performed by: PATHOLOGY

## 2022-11-01 PROCEDURE — 80053 COMPREHEN METABOLIC PANEL: CPT | Performed by: PATHOLOGY

## 2022-11-01 PROCEDURE — 99214 OFFICE O/P EST MOD 30 MIN: CPT | Performed by: INTERNAL MEDICINE

## 2022-11-01 PROCEDURE — 85027 COMPLETE CBC AUTOMATED: CPT | Performed by: PATHOLOGY

## 2022-11-01 PROCEDURE — G0463 HOSPITAL OUTPT CLINIC VISIT: HCPCS

## 2022-11-01 PROCEDURE — 85610 PROTHROMBIN TIME: CPT | Performed by: PATHOLOGY

## 2022-11-01 PROCEDURE — 76700 US EXAM ABDOM COMPLETE: CPT | Mod: GC | Performed by: RADIOLOGY

## 2022-11-01 ASSESSMENT — PAIN SCALES - GENERAL: PAINLEVEL: NO PAIN (0)

## 2022-11-01 NOTE — PROGRESS NOTES
[facility] Hepatology    Follow-up Visit    Follow-up visit for cirrhosis    Subjective:  67 year old female    Cirrhosis  - dx Jan 2020  - cryptogenic/?AIH/BRITO  - liver bx 1/21/2020  - no hx ascites, HE or variceal bleed  - last EGD July 2022- small EV, mild portal hypertensive gastropathy  - HCC screening- abd U/S 11/1/2022     Last visit 05/2022.  The patient underwent EGD 07/14/2022 which showed small esophageal varices.  The patient recently underwent KRIS and right heart catheterization for severe tricuspid regurgitation.  She is scheduled for percutaneous tricuspid clip placement later this month at [facility].  The patient was also restarted on lisinopril by cardiology.    The patient is doing okay today.  The patient is a little bit down because of the recent problems with her health due to the heart.She denies any symptoms related to liver disease.    The patient denies jaundice, lower extremity edema, abdominal distention, lethargy or confusion.    The patient denies melena, hematemesis or hematochezia.    The patient denies fever, sweats or chills.  She is vaccinated and boosted against COVID-19 and she has had her influenza vaccination this season.    Weight is stable.  Appetite is normal.    The patient does not drink alcohol.    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     Obesity      SLE (systemic lupus erythematosus) (H) 02/2020     Thyroid cancer (H) 1996      Past Surgical History:   Procedure Laterality Date     CHOLECYSTECTOMY       ESOPHAGOSCOPY, GASTROSCOPY, DUODENOSCOPY (EGD), COMBINED N/A 2/26/2020    Procedure: ESOPHAGOGASTRODUODENOSCOPY, WITH BIOPSY;  Surgeon: Leventhal, Thomas Michael, MD;  Location:  OR     ESOPHAGOSCOPY, GASTROSCOPY,  DUODENOSCOPY (EGD), COMBINED N/A 7/14/2022    Procedure: ESOPHAGOGASTRODUODENOSCOPY (EGD);  Surgeon: Tang Holloway MD;  Location: [facility] GI     IR LIVER BIOPSY PERCUTANEOUS  1/21/2020     PERCUTANEOUS BIOPSY LIVER Right 1/21/2020    Procedure: Liver Biopsy @9:00;  Surgeon: Emanuel Archer PA-C;  [facility]: [facility] OR     SALPINGO-OOPHORECTOMY BILATERAL       SHOULDER SURGERY       THYROIDECTOMY  1996     [facility] TOTAL ABDOM HYSTERECTOMY            Medications  Prior to Admission medications    Medication Sig Start Date End Date Taking? Authorizing Provider   albuterol (PROAIR HFA/PROVENTIL HFA/VENTOLIN HFA) 108 (90 Base) MCG/ACT Inhaler Inhale 2 puffs into the lungs daily   Yes Reported, Patient   azaTHIOprine (IMURAN) 50 MG tablet Take 1 tablet by mouth daily 2/10/20  Yes Reported, Patient   BREO ELLIPTA 200-25 MCG/INH Inhaler INL 1 PUFF PO DAILY. RM AFTER U 6/24/19  Yes Reported, Patient   calcitRIOL (ROCALTROL) 0.25 MCG capsule Take 0.5 mcg by mouth 2 times daily  8/15/16  Yes Reported, Patient   calcium carbonate-vitamin D (OS-SHAYAN) 600-400 MG-UNIT chewable tablet Take 4 chew tab by mouth 3 times daily  1/13/1996  Yes Reported, Patient   cholecalciferol (VITAMIN D3) 25 mcg (1000 units) capsule Take 1 capsule by mouth daily   Yes Reported, Patient   colchicine (MITIGARE) 0.6 MG capsule Take 0.6 mg by mouth 2 times daily 1/17/20  Yes Reported, Patient   escitalopram (LEXAPRO) 20 MG tablet Take 20 mg by mouth daily 3/2/18  Yes Reported, Patient   Ferrous Sulfate (IRON) 325 (65 Fe) MG tablet Take 1 tablet by mouth daily  2/10/18  Yes Reported, Patient   hydroxychloroquine (PLAQUENIL) 200 MG tablet Take 200 mg by mouth 2 times daily 1/31/20  Yes Reported, Patient   levothyroxine (SYNTHROID/LEVOTHROID) 200 MCG tablet Take 250 mcg by mouth daily  1/13/1996  Yes Reported, Patient   lisinopril (PRINIVIL/ZESTRIL) 20 MG tablet Take 20 mg by mouth daily 5/1/01  Yes Reported, Patient   metoprolol succinate ER (TOPROL-XL)  100 MG 24 hr tablet Take 100 mg by mouth 2 times daily 5/10/21  Yes Reported, Patient   montelukast (SINGULAIR) 10 MG tablet Take 10 mg by mouth daily 5/1/1995  Yes Reported, Patient   omeprazole (PRILOSEC) 40 MG DR capsule Take 40 mg by mouth daily 3/26/21  Yes Reported, Patient   potassium chloride ER (KLOR-CON M) 20 MEQ CR tablet Take 2 tablets by mouth 2 times daily as needed Take only when taking Torsemide 4/28/21  Yes Reported, Patient   SPIRIVA RESPIMAT 2.5 MCG/ACT inhaler INL 2 PFS PO QD 12/20/19  Yes Reported, Patient   torsemide (DEMADEX) 20 MG tablet Take 20 mg by mouth daily as needed (edema)  10/4/20  Yes Reported, Patient   vitamin B complex with vitamin C (STRESS TAB) tablet Take 1 tablet by mouth daily   Yes Reported, Patient   vitamin C (ASCORBIC ACID) 500 MG tablet Take 500 mg by mouth daily 10/22/20  Yes Reported, Patient   warfarin ANTICOAGULANT (COUMADIN) 2 MG tablet Take 2 mg by mouth daily  Patient not taking: Reported on 11/1/2022 9/8/21   Reported, Patient       Allergies  Allergies   Allergen Reactions     Dairy Digestive Diarrhea     Dust Mite Extract Other (See Comments) and Unknown     Sneezing, coughing, runny nose  Sneezing, coughing, runny nose       Gluten Meal      Other reaction(s): *Unknown     Mold Other (See Comments)     Sneezing, coughing, runny nose     Soy Allergy      Other reaction(s): *Unknown       Review of systems  A 10-point review of systems was negative    Examination  BP (!) 186/84   Pulse 68   Temp 98.3  F (36.8  C) (Oral)   Wt 80.9 kg (178 lb 4.8 oz)   SpO2 98%   BMI 30.61 kg/m    Body mass index is 30.61 kg/m .    Gen- well, NAD, A+Ox3, normal color  CVS- RRR, sternotomy scar  RS- CTA  Abd- SNT, no ascites or organomegaly on palpation or percussion, BS+  Extr- hands normal, no ALEX  Skin- no rash or jaundice  Neuro- no asterixis  Psych- normal mood    Laboratory  Lab Results   Component Value Date     11/01/2022     06/16/2021    POTASSIUM 4.0  11/01/2022    POTASSIUM 4.0 04/13/2022    POTASSIUM 4.2 06/16/2021    CHLORIDE 101 11/01/2022    CHLORIDE 103 04/13/2022    CHLORIDE 109 06/16/2021    CO2 28 11/01/2022    CO2 28 04/13/2022    CO2 30 06/16/2021    BUN 9.6 11/01/2022    BUN 10 04/13/2022    BUN 18 06/16/2021    CR 0.85 11/01/2022    CR 1.10 06/16/2021       Lab Results   Component Value Date    BILITOTAL 0.5 11/01/2022    BILITOTAL 0.6 06/16/2021    ALT 5 11/01/2022    ALT 14 06/16/2021    AST 27 11/01/2022    AST 21 06/16/2021    ALKPHOS 102 11/01/2022    ALKPHOS 65 06/16/2021       Lab Results   Component Value Date    ALBUMIN 4.2 11/01/2022    ALBUMIN 3.7 04/13/2022    ALBUMIN 3.1 06/16/2021    PROTTOTAL 7.8 11/01/2022    PROTTOTAL 6.5 06/16/2021        Lab Results   Component Value Date    WBC 4.6 11/01/2022    WBC 3.8 06/16/2021    HGB 12.4 11/01/2022    HGB 11.1 06/16/2021    MCV 84 11/01/2022    MCV 90 06/16/2021     11/01/2022     06/16/2021       Lab Results   Component Value Date    INR 1.18 11/01/2022    INR 1.03 06/16/2021       Radiology  Abd U/S 11/1/2022 (P)  EGD July 2022 personally reviewed- small varices    Assessment  67-year-old female who presents for follow-up of compensated cryptogenic/BRITO cirrhosis.  MELD-Na = 8.  No evidence of ascites or hepatic encephalopathy.  Liver function is adequate to proceed with percutaneous tricuspid clip placement.  Up to date with HCC screening.  Up to date with surveillance of esophageal varices.    Plan  1.  Follow-up on pending US  2.  Low Na diet  3.  Diuretics per cardiology  4.  Follow-up in 6 months    Tang Maddox MD  Hepatology  [facility]    I spent 30 minutes on the date of the encounter doing chart review, history and exam, documentation and further activities as noted above.

## 2022-11-01 NOTE — LETTER
11/1/2022         RE: Geri Zamorano  [address]  [place] [address]        Dear Colleague,    Thank you for referring your patient, Geri Zamorano, to the [facility]PATOLOG[facility] [place]. Please see a copy of my visit note below.    error    [facility] Hepatology    Follow-up Visit    Follow-up visit for cirrhosis    Subjective:  67 year old female    Cirrhosis  - dx Jan 2020  - cryptogenic/?AIH/BRITO  - liver bx 1/21/2020  - no hx ascites, HE or variceal bleed  - last EGD July 2022- small EV, mild portal hypertensive gastropathy  - HCC screening- abd U/S 11/1/2022     Last visit 05/2022.  The patient underwent EGD 07/14/2022 which showed small esophageal varices.  The patient recently underwent KRIS and right heart catheterization for severe tricuspid regurgitation.  She is scheduled for percutaneous tricuspid clip placement later this month at [facility].  The patient was also restarted on lisinopril by cardiology.    The patient is doing okay today.  The patient is a little bit down because of the recent problems with her health due to the heart.She denies any symptoms related to liver disease.    The patient denies jaundice, lower extremity edema, abdominal distention, lethargy or confusion.    The patient denies melena, hematemesis or hematochezia.    The patient denies fever, sweats or chills.  She is vaccinated and boosted against COVID-19 and she has had her influenza vaccination this season.    Weight is stable.  Appetite is normal.    The patient does not drink alcohol.    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     Obesity      SLE (systemic lupus erythematosus) (H) 02/2020     Thyroid cancer (H) 1996      Past Surgical History:    Procedure Laterality Date     CHOLECYSTECTOMY       ESOPHAGOSCOPY, GASTROSCOPY, DUODENOSCOPY (EGD), COMBINED N/A 2/26/2020    Procedure: ESOPHAGOGASTRODUODENOSCOPY, WITH BIOPSY;  Surgeon: Leventhal, Thomas Michael, MD;  [facility]: [facility]     ESOPHAGOSCOPY, GASTROSCOPY, DUODENOSCOPY (EGD), COMBINED N/A 7/14/2022    Procedure: ESOPHAGOGASTRODUODENOSCOPY (EGD);  Surgeon: Tang Holloway MD;  Location: [facility] GI     IR LIVER BIOPSY PERCUTANEOUS  1/21/2020     PERCUTANEOUS BIOPSY LIVER Right 1/21/2020    Procedure: Liver Biopsy @9:00;  Surgeon: Emanuel Archer PA-C;  Location: [facility] OR     SALPINGO-OOPHORECTOMY BILATERAL       SHOULDER SURGERY       THYROIDECTOMY  1996     [facility] TOTAL ABDOM HYSTERECTOMY            Medications  Prior to Admission medications    Medication Sig Start Date End Date Taking? Authorizing Provider   albuterol (PROAIR HFA/PROVENTIL HFA/VENTOLIN HFA) 108 (90 Base) MCG/ACT Inhaler Inhale 2 puffs into the lungs daily   Yes Reported, Patient   azaTHIOprine (IMURAN) 50 MG tablet Take 1 tablet by mouth daily 2/10/20  Yes Reported, Patient   BREO ELLIPTA 200-25 MCG/INH Inhaler INL 1 PUFF PO DAILY. RM AFTER U 6/24/19  Yes Reported, Patient   calcitRIOL (ROCALTROL) 0.25 MCG capsule Take 0.5 mcg by mouth 2 times daily  8/15/16  Yes Reported, Patient   calcium carbonate-vitamin D (OS-SHAYAN) 600-400 MG-UNIT chewable tablet Take 4 chew tab by mouth 3 times daily  1/13/1996  Yes Reported, Patient   cholecalciferol (VITAMIN D3) 25 mcg (1000 units) capsule Take 1 capsule by mouth daily   Yes Reported, Patient   colchicine (MITIGARE) 0.6 MG capsule Take 0.6 mg by mouth 2 times daily 1/17/20  Yes Reported, Patient   escitalopram (LEXAPRO) 20 MG tablet Take 20 mg by mouth daily 3/2/18  Yes Reported, Patient   Ferrous Sulfate (IRON) 325 (65 Fe) MG tablet Take 1 tablet by mouth daily  2/10/18  Yes Reported, Patient   hydroxychloroquine (PLAQUENIL) 200 MG tablet Take 200 mg by mouth 2 times daily 1/31/20  Yes  Reported, Patient   levothyroxine (SYNTHROID/LEVOTHROID) 200 MCG tablet Take 250 mcg by mouth daily  1/13/1996  Yes Reported, Patient   lisinopril (PRINIVIL/ZESTRIL) 20 MG tablet Take 20 mg by mouth daily 5/1/01  Yes Reported, Patient   metoprolol succinate ER (TOPROL-XL) 100 MG 24 hr tablet Take 100 mg by mouth 2 times daily 5/10/21  Yes Reported, Patient   montelukast (SINGULAIR) 10 MG tablet Take 10 mg by mouth daily 5/1/1995  Yes Reported, Patient   omeprazole (PRILOSEC) 40 MG DR capsule Take 40 mg by mouth daily 3/26/21  Yes Reported, Patient   potassium chloride ER (KLOR-CON M) 20 MEQ CR tablet Take 2 tablets by mouth 2 times daily as needed Take only when taking Torsemide 4/28/21  Yes Reported, Patient   SPIRIVA RESPIMAT 2.5 MCG/ACT inhaler INL 2 PFS PO QD 12/20/19  Yes Reported, Patient   torsemide (DEMADEX) 20 MG tablet Take 20 mg by mouth daily as needed (edema)  10/4/20  Yes Reported, Patient   vitamin B complex with vitamin C (STRESS TAB) tablet Take 1 tablet by mouth daily   Yes Reported, Patient   vitamin C (ASCORBIC ACID) 500 MG tablet Take 500 mg by mouth daily 10/22/20  Yes Reported, Patient   warfarin ANTICOAGULANT (COUMADIN) 2 MG tablet Take 2 mg by mouth daily  Patient not taking: Reported on 11/1/2022 9/8/21   Reported, Patient       Allergies  Allergies   Allergen Reactions     Dairy Digestive Diarrhea     Dust Mite Extract Other (See Comments) and Unknown     Sneezing, coughing, runny nose  Sneezing, coughing, runny nose       Gluten Meal      Other reaction(s): *Unknown     Mold Other (See Comments)     Sneezing, coughing, runny nose     Soy Allergy      Other reaction(s): *Unknown       Review of systems  A 10-point review of systems was negative    Examination  BP (!) 186/84   Pulse 68   Temp 98.3  F (36.8  C) (Oral)   Wt 80.9 kg (178 lb 4.8 oz)   SpO2 98%   BMI 30.61 kg/m    Body mass index is 30.61 kg/m .    Gen- well, NAD, A+Ox3, normal color  CVS- RRR, sternotomy scar  RS-  CTA  Abd- SNT, no ascites or organomegaly on palpation or percussion, BS+  Extr- hands normal, no ALEX  Skin- no rash or jaundice  Neuro- no asterixis  Psych- normal mood    Laboratory  Lab Results   Component Value Date     11/01/2022     06/16/2021    POTASSIUM 4.0 11/01/2022    POTASSIUM 4.0 04/13/2022    POTASSIUM 4.2 06/16/2021    CHLORIDE 101 11/01/2022    CHLORIDE 103 04/13/2022    CHLORIDE 109 06/16/2021    CO2 28 11/01/2022    CO2 28 04/13/2022    CO2 30 06/16/2021    BUN 9.6 11/01/2022    BUN 10 04/13/2022    BUN 18 06/16/2021    CR 0.85 11/01/2022    CR 1.10 06/16/2021       Lab Results   Component Value Date    BILITOTAL 0.5 11/01/2022    BILITOTAL 0.6 06/16/2021    ALT 5 11/01/2022    ALT 14 06/16/2021    AST 27 11/01/2022    AST 21 06/16/2021    ALKPHOS 102 11/01/2022    ALKPHOS 65 06/16/2021       Lab Results   Component Value Date    ALBUMIN 4.2 11/01/2022    ALBUMIN 3.7 04/13/2022    ALBUMIN 3.1 06/16/2021    PROTTOTAL 7.8 11/01/2022    PROTTOTAL 6.5 06/16/2021        Lab Results   Component Value Date    WBC 4.6 11/01/2022    WBC 3.8 06/16/2021    HGB 12.4 11/01/2022    HGB 11.1 06/16/2021    MCV 84 11/01/2022    MCV 90 06/16/2021     11/01/2022     06/16/2021       Lab Results   Component Value Date    INR 1.18 11/01/2022    INR 1.03 06/16/2021       Radiology  Abd U/S 11/1/2022 (P)  EGD July 2022 personally reviewed- small varices    Assessment  67-year-old female who presents for follow-up of compensated cryptogenic/BRITO cirrhosis.  MELD-Na = 8.  No evidence of ascites or hepatic encephalopathy.  Liver function is adequate to proceed with percutaneous tricuspid clip placement.  Up to date with HCC screening.  Up to date with surveillance of esophageal varices.    Plan  1.  Follow-up on pending US  2.  Low Na diet  3.  Diuretics per cardiology  4.  Follow-up in 6 months    Tang Maddox MD  Hepatology  [facility]    I spent 30 minutes on the date of the encounter  doing chart review, history and exam, documentation and further activities as noted above.      Again, thank you for allowing me to participate in the care of your patient.        Sincerely,        Tang Maddox MD

## 2023-04-25 ENCOUNTER — DOCUMENTATION ONLY (OUTPATIENT)
Dept: LAB | Facility: CLINIC | Age: 68
End: 2023-04-25
Payer: COMMERCIAL

## 2023-04-25 DIAGNOSIS — K75.81 LIVER CIRRHOSIS SECONDARY TO NASH (H): Primary | ICD-10-CM

## 2023-04-25 DIAGNOSIS — K74.60 LIVER CIRRHOSIS SECONDARY TO NASH (H): Primary | ICD-10-CM

## 2023-05-26 ENCOUNTER — TELEPHONE (OUTPATIENT)
Dept: GASTROENTEROLOGY | Facility: CLINIC | Age: 68
End: 2023-05-26
Payer: COMMERCIAL

## 2023-05-26 NOTE — TELEPHONE ENCOUNTER
[facility] Call Center    Phone Message    May a detailed message be left on voicemail: yes     Reason for Call: Other: Pt called back to reschedule. Please review lab orders - pt is now scheduled in October (was  a no show in May)     Action Taken: Message routed to:  [facility] Hepatology    Travel Screening: Not Applicable

## 2023-07-30 ENCOUNTER — HEALTH MAINTENANCE LETTER (OUTPATIENT)
Age: 68
End: 2023-07-30

## 2023-08-03 ENCOUNTER — TELEPHONE (OUTPATIENT)
Dept: GASTROENTEROLOGY | Facility: CLINIC | Age: 68
End: 2023-08-03
Payer: COMMERCIAL

## 2023-11-01 ENCOUNTER — LAB (OUTPATIENT)
Dept: LAB | Facility: CLINIC | Age: 68
End: 2023-11-01
Attending: INTERNAL MEDICINE
Payer: COMMERCIAL

## 2023-11-01 ENCOUNTER — OFFICE VISIT (OUTPATIENT)
Dept: GASTROENTEROLOGY | Facility: CLINIC | Age: 68
End: 2023-11-01
Attending: INTERNAL MEDICINE
Payer: COMMERCIAL

## 2023-11-01 ENCOUNTER — ANCILLARY PROCEDURE (OUTPATIENT)
Dept: ULTRASOUND IMAGING | Facility: CLINIC | Age: 68
End: 2023-11-01
Attending: INTERNAL MEDICINE
Payer: COMMERCIAL

## 2023-11-01 VITALS
SYSTOLIC BLOOD PRESSURE: 109 MMHG | WEIGHT: 183.1 LBS | DIASTOLIC BLOOD PRESSURE: 65 MMHG | HEART RATE: 68 BPM | BODY MASS INDEX: 31.43 KG/M2 | OXYGEN SATURATION: 97 %

## 2023-11-01 DIAGNOSIS — K75.81 LIVER CIRRHOSIS SECONDARY TO NASH (H): Primary | ICD-10-CM

## 2023-11-01 DIAGNOSIS — K74.60 LIVER CIRRHOSIS SECONDARY TO NASH (H): Primary | ICD-10-CM

## 2023-11-01 DIAGNOSIS — K75.81 LIVER CIRRHOSIS SECONDARY TO NASH (H): ICD-10-CM

## 2023-11-01 DIAGNOSIS — K74.60 LIVER CIRRHOSIS SECONDARY TO NASH (H): ICD-10-CM

## 2023-11-01 LAB
ALBUMIN SERPL BCG-MCNC: 4.2 G/DL (ref 3.5–5.2)
ALP SERPL-CCNC: 83 U/L (ref 35–104)
ALT SERPL W P-5'-P-CCNC: 6 U/L (ref 0–50)
ANION GAP SERPL CALCULATED.3IONS-SCNC: 11 MMOL/L (ref 7–15)
AST SERPL W P-5'-P-CCNC: 21 U/L (ref 0–45)
BILIRUB DIRECT SERPL-MCNC: <0.2 MG/DL (ref 0–0.3)
BILIRUB SERPL-MCNC: 0.5 MG/DL
BUN SERPL-MCNC: 18 MG/DL (ref 8–23)
CALCIUM SERPL-MCNC: 9.8 MG/DL (ref 8.8–10.2)
CHLORIDE SERPL-SCNC: 103 MMOL/L (ref 98–107)
CREAT SERPL-MCNC: 1.72 MG/DL (ref 0.51–0.95)
DEPRECATED HCO3 PLAS-SCNC: 27 MMOL/L (ref 22–29)
EGFRCR SERPLBLD CKD-EPI 2021: 31 ML/MIN/1.73M2
ERYTHROCYTE [DISTWIDTH] IN BLOOD BY AUTOMATED COUNT: 13.5 % (ref 10–15)
GLUCOSE SERPL-MCNC: 101 MG/DL (ref 70–99)
HCT VFR BLD AUTO: 42.2 % (ref 35–47)
HGB BLD-MCNC: 13.7 G/DL (ref 11.7–15.7)
INR PPP: 1.14 (ref 0.85–1.15)
MCH RBC QN AUTO: 29.3 PG (ref 26.5–33)
MCHC RBC AUTO-ENTMCNC: 32.5 G/DL (ref 31.5–36.5)
MCV RBC AUTO: 90 FL (ref 78–100)
PLATELET # BLD AUTO: 149 10E3/UL (ref 150–450)
POTASSIUM SERPL-SCNC: 3.5 MMOL/L (ref 3.4–5.3)
PROT SERPL-MCNC: 7.5 G/DL (ref 6.4–8.3)
RBC # BLD AUTO: 4.67 10E6/UL (ref 3.8–5.2)
SODIUM SERPL-SCNC: 141 MMOL/L (ref 135–145)
WBC # BLD AUTO: 5.7 10E3/UL (ref 4–11)

## 2023-11-01 PROCEDURE — 76700 US EXAM ABDOM COMPLETE: CPT | Mod: GC | Performed by: STUDENT IN AN ORGANIZED HEALTH CARE EDUCATION/TRAINING PROGRAM

## 2023-11-01 PROCEDURE — 85610 PROTHROMBIN TIME: CPT | Performed by: PATHOLOGY

## 2023-11-01 PROCEDURE — 82248 BILIRUBIN DIRECT: CPT | Performed by: PATHOLOGY

## 2023-11-01 PROCEDURE — 85027 COMPLETE CBC AUTOMATED: CPT | Performed by: PATHOLOGY

## 2023-11-01 PROCEDURE — G0463 HOSPITAL OUTPT CLINIC VISIT: HCPCS | Performed by: INTERNAL MEDICINE

## 2023-11-01 PROCEDURE — 36415 COLL VENOUS BLD VENIPUNCTURE: CPT | Performed by: PATHOLOGY

## 2023-11-01 PROCEDURE — 80053 COMPREHEN METABOLIC PANEL: CPT | Performed by: PATHOLOGY

## 2023-11-01 PROCEDURE — 99215 OFFICE O/P EST HI 40 MIN: CPT | Performed by: INTERNAL MEDICINE

## 2023-11-01 NOTE — LETTER
11/1/2023         RE: Geri Zamorano  [address]  [place] [address]        Dear Colleague,    Thank you for referring your patient, Geri Zamorano, to the [facility]PATOLOGY [facility] [place]. Please see a copy of my visit note below.    [facility] Hepatology    Follow-up Visit    Follow-up visit for cirrhosis    Subjective:  68 year old female    Cirrhosis  - dx Jan 2020  - cryptogenic/?AIH/BRITO  - liver bx 1/21/2020  - no hx ascites, HE or variceal bleed  - last EGD July 2022- small EV, mild portal hypertensive gastropathy  - HCC screening- abd U/S 11/1/2023     Last visit November 2022.  Patient did not require tricuspid valve clip as initially planned.  She most recently saw cardiology at [facility] last month with improvements in tricuspid valve function and right heart function noted.  Patient was in the ER in June 2023 with right shoulder pain secondary to osteoarthritis.  She is currently undergoing physical therapy with and will ultimately need shoulder replacement surgery.  No surgery has been scheduled at this time.  No hospitalizations since last clinic visit.    Patient is well today.  She denies any symptoms related to liver disease.    Patient reports mild lower extremity edema.  She denies jaundice, abdominal distension, lethargy or confusion.    Patient denies melena, hematemesis or hematochezia.    Patient denies fevers, sweats or chills.  Patient has had her shingles and influenza vaccinations.  She has not yet had her COVID-19 vaccination.    Weight is 183 pounds today.  Appetite is good.    Patient does not drink alcohol.      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      Migraine      Obesity      SLE (systemic lupus erythematosus) (H) 02/2020     Thyroid cancer (H) 1996      Past Surgical History:   Procedure Laterality Date     CHOLECYSTECTOMY       ESOPHAGOSCOPY, GASTROSCOPY, DUODENOSCOPY (EGD), COMBINED N/A 2/26/2020    Procedure: ESOPHAGOGASTRODUODENOSCOPY, WITH BIOPSY;  Surgeon: Leventhal, Thomas Michael, MD;  [facility]     ESOPHAGOSCOPY, GASTROSCOPY, DUODENOSCOPY (EGD), COMBINED N/A 7/14/2022    Procedure: ESOPHAGOGASTRODUODENOSCOPY (EGD);  Surgeon: Tang Holloway MD;  Location: [facility] GI     IR LIVER BIOPSY PERCUTANEOUS  1/21/2020     PERCUTANEOUS BIOPSY LIVER Right 1/21/2020    Procedure: Liver Biopsy @9:00;  Surgeon: Emanuel Archer PA-C;  [facility]: [facility] OR     SALPINGO-OOPHORECTOMY BILATERAL       SHOULDER SURGERY       THYROIDECTOMY  1996     [facility] TOTAL ABDOM HYSTERECTOMY            Medications  Current Outpatient Medications   Medication Sig Dispense Refill     albuterol (PROAIR HFA/PROVENTIL HFA/VENTOLIN HFA) 108 (90 Base) MCG/ACT Inhaler Inhale 2 puffs into the lungs daily       amLODIPine (NORVASC) 5 MG tablet Take 5 mg by mouth daily       azaTHIOprine (IMURAN) 50 MG tablet Take 1 tablet by mouth daily       BREO ELLIPTA 200-25 MCG/INH Inhaler INL 1 PUFF PO DAILY. RM AFTER U  5     budesonide-formoterol (SYMBICORT) 160-4.5 MCG/ACT Inhaler Inhale 2 puffs into the lungs 2 times daily       calcitRIOL (ROCALTROL) 0.25 MCG capsule Take 0.5 mcg by mouth 2 times daily        calcium carbonate-vitamin D (OS-SHAYAN) 600-400 MG-UNIT chewable tablet Take 4 chew tab by mouth 3 times daily        cholecalciferol (VITAMIN D3) 25 mcg (1000 units) capsule Take 1 capsule by mouth daily       colchicine (MITIGARE) 0.6 MG capsule Take 0.6 mg by mouth 2 times daily       escitalopram (LEXAPRO) 20 MG tablet Take 20 mg by mouth daily       Ferrous Sulfate (IRON) 325 (65 Fe) MG tablet Take 1 tablet by mouth daily        hydroxychloroquine (PLAQUENIL) 200 MG tablet Take  200 mg by mouth 2 times daily       levothyroxine (SYNTHROID/LEVOTHROID) 200 MCG tablet Take 250 mcg by mouth daily        lisinopril (PRINIVIL/ZESTRIL) 20 MG tablet Take 20 mg by mouth daily       metoprolol succinate ER (TOPROL-XL) 100 MG 24 hr tablet Take 100 mg by mouth 2 times daily       montelukast (SINGULAIR) 10 MG tablet Take 10 mg by mouth daily       omeprazole (PRILOSEC) 40 MG DR capsule Take 40 mg by mouth daily       oxyCODONE (ROXICODONE) 5 MG tablet Take 5 mg by mouth every 4 hours as needed       potassium chloride ER (KLOR-CON M) 20 MEQ CR tablet Take 2 tablets by mouth 2 times daily as needed Take only when taking Torsemide       SPIRIVA RESPIMAT 2.5 MCG/ACT inhaler INL 2 PFS PO QD       spironolactone (ALDACTONE) 25 MG tablet Take 25 mg by mouth daily       tiZANidine (ZANAFLEX) 4 MG tablet Take 1 tablet by mouth every 8 hours as needed       torsemide (DEMADEX) 20 MG tablet Take 20 mg by mouth daily as needed (edema)        vitamin B complex with vitamin C (STRESS TAB) tablet Take 1 tablet by mouth daily       vitamin C (ASCORBIC ACID) 500 MG tablet Take 500 mg by mouth daily       atorvastatin (LIPITOR) 20 MG tablet Take 20 mg by mouth daily       FARXIGA 10 MG TABS tablet Take 10 mg by mouth daily       rizatriptan (MAXALT-MLT) 10 MG ODT Take 10 mg by mouth daily as needed       topiramate (TOPAMAX) 25 MG tablet Take 1 tablet by mouth daily       Allergies  Allergies   Allergen Reactions     Dairy Digestive Diarrhea     Dust Mite Extract Other (See Comments) and Unknown     Sneezing, coughing, runny nose  Sneezing, coughing, runny nose       Gluten Meal      Other reaction(s): *Unknown     Mold Other (See Comments)     Sneezing, coughing, runny nose     Soy Allergy      Other reaction(s): *Unknown       Review of systems  A 10-point review of systems was negative    Examination  /65   Pulse 68   Wt 83.1 kg (183 lb 1.6 oz)   SpO2 97%   BMI 31.43 kg/m    Body mass index is 31.43  kg/m .    Gen- well, NAD, A+Ox3, normal color  CVS- RRR, ?late systolic murmur, sternotomy scar  RS- CTA  Abd- striae, SNT, no ascites or organomegaly on palpation or percussion, BS+  Extr- hands normal, no ALEX  Skin- no rash or jaundice  Neuro- no asterixis  Psych- normal mood    Laboratory  Lab Results   Component Value Date     11/01/2023     06/16/2021    POTASSIUM 3.5 11/01/2023    POTASSIUM 4.0 04/13/2022    POTASSIUM 4.2 06/16/2021    CHLORIDE 103 11/01/2023    CHLORIDE 103 04/13/2022    CHLORIDE 109 06/16/2021    CO2 27 11/01/2023    CO2 28 04/13/2022    CO2 30 06/16/2021    BUN 18.0 11/01/2023    BUN 10 04/13/2022    BUN 18 06/16/2021    CR 1.72 11/01/2023    CR 1.10 06/16/2021       Lab Results   Component Value Date    BILITOTAL 0.5 11/01/2023    BILITOTAL 0.6 06/16/2021    ALT 6 11/01/2023    ALT 14 06/16/2021    AST 21 11/01/2023    AST 21 06/16/2021    ALKPHOS 83 11/01/2023    ALKPHOS 65 06/16/2021       Lab Results   Component Value Date    ALBUMIN 4.2 11/01/2023    ALBUMIN 3.7 04/13/2022    ALBUMIN 3.1 06/16/2021    PROTTOTAL 7.5 11/01/2023    PROTTOTAL 6.5 06/16/2021        Lab Results   Component Value Date    WBC 5.7 11/01/2023    WBC 3.8 06/16/2021    HGB 13.7 11/01/2023    HGB 11.1 06/16/2021    MCV 90 11/01/2023    MCV 90 06/16/2021     11/01/2023     06/16/2021       Lab Results   Component Value Date    INR 1.14 11/01/2023    INR 1.03 06/16/2021       Radiology  Abd U/S 11/1/2023 reviewed- no HCC or ascites    Assessment  68 year old female who presents for follow-up of compensated cryptogenic/MASLD cirrhosis.  MELD 3.0 = 15 (increased due to serum Cr).  No evidence of ascites or HE.  Up to date with HCC screening.  Will need EGD prior to any planned surgery for risk stratification of portal hypertension/liver disease.    Predicted Postoperative Outcomes by the VOCAL-Av Score for elective orthopedic surgery:      30-day mortality: 0.2%      90-day mortality: 1.2%       180-day mortality: 1.5%      90-day decompensation: 1.9%    Plan  1.  Low Na diet  2.  Continue atorvastatin  3.  Continue diuretics per cardiology  4.  EGD with me  5.  Follow-up in 6 months    Tang Maddox MD  Hepatology  [facility]    I spent 40 minutes on the date of the encounter doing chart review, history and exam, documentation and further activities as noted above.      Again, thank you for allowing me to participate in the care of your patient.        Sincerely,        Tang Maddox MD

## 2023-11-01 NOTE — PROGRESS NOTES
[facility] Hepatology    Follow-up Visit    Follow-up visit for cirrhosis    Subjective:  68 year old female    Cirrhosis  - dx Jan 2020  - cryptogenic/?AIH/BRITO  - liver bx 1/21/2020  - no hx ascites, HE or variceal bleed  - last EGD July 2022- small EV, mild portal hypertensive gastropathy  - HCC screening- abd U/S 11/1/2023     Last visit November 2022.  Patient did not require tricuspid valve clip as initially planned.  She most recently saw cardiology at [facility] last month with improvements in tricuspid valve function and right heart function noted.  Patient was in the ER in June 2023 with right shoulder pain secondary to osteoarthritis.  She is currently undergoing physical therapy with and will ultimately need shoulder replacement surgery.  No surgery has been scheduled at this time.  No hospitalizations since last clinic visit.    Patient is well today.  She denies any symptoms related to liver disease.    Patient reports mild lower extremity edema.  She denies jaundice, abdominal distension, lethargy or confusion.    Patient denies melena, hematemesis or hematochezia.    Patient denies fevers, sweats or chills.  Patient has had her shingles and influenza vaccinations.  She has not yet had her COVID-19 vaccination.    Weight is 183 pounds today.  Appetite is good.    Patient does not drink alcohol.      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     Migraine      Obesity      SLE (systemic lupus erythematosus) (H) 02/2020     Thyroid cancer (H) 1996      Past Surgical History:   Procedure Laterality Date     CHOLECYSTECTOMY       ESOPHAGOSCOPY, GASTROSCOPY, DUODENOSCOPY (EGD), COMBINED N/A 2/26/2020     Procedure: ESOPHAGOGASTRODUODENOSCOPY, WITH BIOPSY;  Surgeon: Leventhal, Thomas Michael, MD;  [facility]     ESOPHAGOSCOPY, GASTROSCOPY, DUODENOSCOPY (EGD), COMBINED N/A 7/14/2022    Procedure: ESOPHAGOGASTRODUODENOSCOPY (EGD);  Surgeon: Tang Holloway MD;  [facility]: [facility] GI     IR LIVER BIOPSY PERCUTANEOUS  1/21/2020     PERCUTANEOUS BIOPSY LIVER Right 1/21/2020    Procedure: Liver Biopsy @9:00;  Surgeon: Emanuel Archer PA-C;  [facility]     SALPINGO-OOPHORECTOMY BILATERAL       SHOULDER SURGERY       THYROIDECTOMY  1996     ZZC TOTAL ABDOM HYSTERECTOMY            Medications  Current Outpatient Medications   Medication Sig Dispense Refill     albuterol (PROAIR HFA/PROVENTIL HFA/VENTOLIN HFA) 108 (90 Base) MCG/ACT Inhaler Inhale 2 puffs into the lungs daily       amLODIPine (NORVASC) 5 MG tablet Take 5 mg by mouth daily       azaTHIOprine (IMURAN) 50 MG tablet Take 1 tablet by mouth daily       BREO ELLIPTA 200-25 MCG/INH Inhaler INL 1 PUFF PO DAILY. RM AFTER U  5     budesonide-formoterol (SYMBICORT) 160-4.5 MCG/ACT Inhaler Inhale 2 puffs into the lungs 2 times daily       calcitRIOL (ROCALTROL) 0.25 MCG capsule Take 0.5 mcg by mouth 2 times daily        calcium carbonate-vitamin D (OS-SHAYAN) 600-400 MG-UNIT chewable tablet Take 4 chew tab by mouth 3 times daily        cholecalciferol (VITAMIN D3) 25 mcg (1000 units) capsule Take 1 capsule by mouth daily       colchicine (MITIGARE) 0.6 MG capsule Take 0.6 mg by mouth 2 times daily       escitalopram (LEXAPRO) 20 MG tablet Take 20 mg by mouth daily       Ferrous Sulfate (IRON) 325 (65 Fe) MG tablet Take 1 tablet by mouth daily        hydroxychloroquine (PLAQUENIL) 200 MG tablet Take 200 mg by mouth 2 times daily       levothyroxine (SYNTHROID/LEVOTHROID) 200 MCG tablet Take 250 mcg by mouth daily        lisinopril (PRINIVIL/ZESTRIL) 20 MG tablet Take 20 mg by mouth daily       metoprolol succinate ER (TOPROL-XL) 100 MG 24 hr tablet Take 100  mg by mouth 2 times daily       montelukast (SINGULAIR) 10 MG tablet Take 10 mg by mouth daily       omeprazole (PRILOSEC) 40 MG DR capsule Take 40 mg by mouth daily       oxyCODONE (ROXICODONE) 5 MG tablet Take 5 mg by mouth every 4 hours as needed       potassium chloride ER (KLOR-CON M) 20 MEQ CR tablet Take 2 tablets by mouth 2 times daily as needed Take only when taking Torsemide       SPIRIVA RESPIMAT 2.5 MCG/ACT inhaler INL 2 PFS PO QD       spironolactone (ALDACTONE) 25 MG tablet Take 25 mg by mouth daily       tiZANidine (ZANAFLEX) 4 MG tablet Take 1 tablet by mouth every 8 hours as needed       torsemide (DEMADEX) 20 MG tablet Take 20 mg by mouth daily as needed (edema)        vitamin B complex with vitamin C (STRESS TAB) tablet Take 1 tablet by mouth daily       vitamin C (ASCORBIC ACID) 500 MG tablet Take 500 mg by mouth daily       atorvastatin (LIPITOR) 20 MG tablet Take 20 mg by mouth daily       FARXIGA 10 MG TABS tablet Take 10 mg by mouth daily       rizatriptan (MAXALT-MLT) 10 MG ODT Take 10 mg by mouth daily as needed       topiramate (TOPAMAX) 25 MG tablet Take 1 tablet by mouth daily       Allergies  Allergies   Allergen Reactions     Dairy Digestive Diarrhea     Dust Mite Extract Other (See Comments) and Unknown     Sneezing, coughing, runny nose  Sneezing, coughing, runny nose       Gluten Meal      Other reaction(s): *Unknown     Mold Other (See Comments)     Sneezing, coughing, runny nose     Soy Allergy      Other reaction(s): *Unknown       Review of systems  A 10-point review of systems was negative    Examination  /65   Pulse 68   Wt 83.1 kg (183 lb 1.6 oz)   SpO2 97%   BMI 31.43 kg/m    Body mass index is 31.43 kg/m .    Gen- well, NAD, A+Ox3, normal color  CVS- RRR, ?late systolic murmur, sternotomy scar  RS- CTA  Abd- striae, SNT, no ascites or organomegaly on palpation or percussion, BS+  Extr- hands normal, no ALEX  Skin- no rash or jaundice  Neuro- no asterixis  Psych-  normal mood    Laboratory  Lab Results   Component Value Date     11/01/2023     06/16/2021    POTASSIUM 3.5 11/01/2023    POTASSIUM 4.0 04/13/2022    POTASSIUM 4.2 06/16/2021    CHLORIDE 103 11/01/2023    CHLORIDE 103 04/13/2022    CHLORIDE 109 06/16/2021    CO2 27 11/01/2023    CO2 28 04/13/2022    CO2 30 06/16/2021    BUN 18.0 11/01/2023    BUN 10 04/13/2022    BUN 18 06/16/2021    CR 1.72 11/01/2023    CR 1.10 06/16/2021       Lab Results   Component Value Date    BILITOTAL 0.5 11/01/2023    BILITOTAL 0.6 06/16/2021    ALT 6 11/01/2023    ALT 14 06/16/2021    AST 21 11/01/2023    AST 21 06/16/2021    ALKPHOS 83 11/01/2023    ALKPHOS 65 06/16/2021       Lab Results   Component Value Date    ALBUMIN 4.2 11/01/2023    ALBUMIN 3.7 04/13/2022    ALBUMIN 3.1 06/16/2021    PROTTOTAL 7.5 11/01/2023    PROTTOTAL 6.5 06/16/2021        Lab Results   Component Value Date    WBC 5.7 11/01/2023    WBC 3.8 06/16/2021    HGB 13.7 11/01/2023    HGB 11.1 06/16/2021    MCV 90 11/01/2023    MCV 90 06/16/2021     11/01/2023     06/16/2021       Lab Results   Component Value Date    INR 1.14 11/01/2023    INR 1.03 06/16/2021       Radiology  Abd U/S 11/1/2023 reviewed- no HCC or ascites    Assessment  68 year old female who presents for follow-up of compensated cryptogenic/MASLD cirrhosis.  MELD 3.0 = 15 (increased due to serum Cr).  No evidence of ascites or HE.  Up to date with HCC screening.  Will need EGD prior to any planned surgery for risk stratification of portal hypertension/liver disease.    Predicted Postoperative Outcomes by the VOCAL-Aurora Score for elective orthopedic surgery:      30-day mortality: 0.2%      90-day mortality: 1.2%      180-day mortality: 1.5%      90-day decompensation: 1.9%    Plan  1.  Low Na diet  2.  Continue atorvastatin  3.  Continue diuretics per cardiology  4.  EGD with me  5.  Follow-up in 6 months    Tang Maddox MD  Hepatology  [facility]    I spent 40  minutes on the date of the encounter doing chart review, history and exam, documentation and further activities as noted above.

## 2023-11-02 RX ORDER — TOPIRAMATE 25 MG/1
1 TABLET, FILM COATED ORAL DAILY
COMMUNITY
End: 2024-09-11

## 2023-11-02 RX ORDER — BUDESONIDE AND FORMOTEROL FUMARATE DIHYDRATE 160; 4.5 UG/1; UG/1
2 AEROSOL RESPIRATORY (INHALATION) 2 TIMES DAILY
COMMUNITY
End: 2024-09-11

## 2023-11-02 RX ORDER — DAPAGLIFLOZIN 10 MG/1
10 TABLET, FILM COATED ORAL DAILY
COMMUNITY
Start: 2023-10-25

## 2023-11-02 RX ORDER — AMLODIPINE BESYLATE 5 MG/1
5 TABLET ORAL DAILY
COMMUNITY

## 2023-11-02 RX ORDER — OXYCODONE HYDROCHLORIDE 5 MG/1
5 TABLET ORAL EVERY 4 HOURS PRN
COMMUNITY
Start: 2023-06-13 | End: 2024-03-13

## 2023-11-02 RX ORDER — ATORVASTATIN CALCIUM 20 MG/1
20 TABLET, FILM COATED ORAL DAILY
COMMUNITY

## 2023-11-02 RX ORDER — RIZATRIPTAN BENZOATE 10 MG/1
10 TABLET, ORALLY DISINTEGRATING ORAL DAILY PRN
COMMUNITY
Start: 2023-09-13

## 2023-11-02 RX ORDER — SPIRONOLACTONE 25 MG/1
25 TABLET ORAL DAILY
COMMUNITY
Start: 2023-02-23 | End: 2024-09-11

## 2023-11-21 ENCOUNTER — HOSPITAL ENCOUNTER (OUTPATIENT)
Facility: CLINIC | Age: 68
End: 2023-11-21
Attending: INTERNAL MEDICINE | Admitting: INTERNAL MEDICINE
Payer: COMMERCIAL

## 2023-11-21 ENCOUNTER — TELEPHONE (OUTPATIENT)
Dept: GASTROENTEROLOGY | Facility: CLINIC | Age: 68
End: 2023-11-21
Payer: COMMERCIAL

## 2023-11-21 NOTE — TELEPHONE ENCOUNTER
"Endoscopy Scheduling Screen    Have you had a positive Covid test in the last 14 days?  No    Are you active on MyChart?   Yes    What insurance is in the chart?  Other:  [employer]    Ordering/Referring Provider:     DEIRDRE FINCH      (If ordering provider performs procedure, schedule with ordering provider unless otherwise instructed. )    BMI: Estimated body mass index is 31.43 kg/m  as calculated from the following:    Height as of 6/16/21: 1.626 m (5' 4\").    Weight as of 11/1/23: 83.1 kg (183 lb 1.6 oz).     Sedation Ordered  moderate sedation.   If patient BMI > 50 do not schedule in ASC.    If patient BMI > 45 do not schedule at [facility].    Are you taking methadone or Suboxone?  No    Are you taking any prescription medications for pain 3 or more times per week?   No    Do you have a history of malignant hyperthermia or adverse reaction to anesthesia?  No    (Females) Are you currently pregnant?        Have you been diagnosed or told you have pulmonary hypertension?   No    Do you have an LVAD?  No    Have you been told you have moderate to severe sleep apnea?  No    Have you been told you have COPD, asthma, or any other lung disease?  Yes     What breathing problems do you have?  COPD and Asthma     Do you use home oxygen?  No    Have your breathing problems required an ED visit or hospitalization in the last year?  No    Do you have any heart conditions?  Yes     In the past 6 months, have you had any hospitalizations for heart related issues including cardiomyopathy, heart attack, or stent placement?  No    Do you have any implantable devices in your body (pacemaker, ICD)?  No    Do you take nitroglycerine?  No    Have you ever had an organ transplant?   No    Have you ever had or are you awaiting a heart or lung transplant?   No    Have you had a stroke or transient ischemic attack (TIA aka \"mini stroke\" in the last 6 months?   No    Have you been diagnosed with or been told you have cirrhosis of the " "liver?   Yes (RN Review required for scheduling unless scheduling in Hospital.)    Are you currently on dialysis?   No    Do you need assistance transferring?   No    BMI: Estimated body mass index is 31.43 kg/m  as calculated from the following:    Height as of 6/16/21: 1.626 m (5' 4\").    Weight as of 11/1/23: 83.1 kg (183 lb 1.6 oz).     Is patients BMI > 40 and scheduling location [facility]?  No    Do you take an injectable medication for weight loss or diabetes (excluding insulin)?  No    Do you take the medication Naltrexone?  No    Do you take blood thinners?  No       Prep   Are you currently on dialysis or do you have chronic kidney disease?  No    Do you have a diagnosis of diabetes?  No    Do you have a diagnosis of cystic fibrosis (CF)?  No    On a regular basis do you go 3 -5 days between bowel movements?      BMI > 40?      Preferred Pharmacy:    [id number] - [place], [place] - [address] [place] [address] [facility] [address]   [address] [place] [address]  Phone: [phone] Fax: [phone]      Final Scheduling Details   Colonoscopy prep sent?      Procedure scheduled  Upper endoscopy (EGD)    Surgeon:  HERRERA     Date of procedure:  2/8     Pre-OP / PAC:   No - Not required for this site.    Location  [facility] - Per order.    Sedation   Moderate Sedation - Per order.      Patient Reminders:   You will receive a call from a Nurse to review instructions and health history.  This assessment must be completed prior to your procedure.  Failure to complete the Nurse assessment may result in the procedure being cancelled.      On the day of your procedure, please designate an adult(s) who can drive you home stay with you for the next 24 hours. The medicines used in the exam will make you sleepy. You will not be able to drive.      You cannot take public transportation, ride share services, or non-medical taxi service without a responsible caregiver.  Medical transport services are allowed with the " requirement that a responsible caregiver will receive you at your destination.  We require that drivers and caregivers are confirmed prior to your procedure.

## 2024-01-26 ENCOUNTER — TELEPHONE (OUTPATIENT)
Dept: GASTROENTEROLOGY | Facility: CLINIC | Age: 69
End: 2024-01-26
Payer: COMMERCIAL

## 2024-01-26 NOTE — TELEPHONE ENCOUNTER
Attempted to contact patient in order to complete pre assessment questions.     No answer. Left message to return call to [phone] option 4    Missed call communication sent via [employer].    Trish Castro RN  Endoscopy Procedure Pre Assessment RN

## 2024-01-26 NOTE — TELEPHONE ENCOUNTER
Pre visit planning completed.      Procedure details:    Patient scheduled for Upper endoscopy (EGD) on 2/8/2024.     Arrival time: 1415. Procedure time 1515    Pre op exam needed? N/A    Facility location: [facility]; [address] [facility], [address], [place] [address]    Sedation type: Conscious sedation     Indication for procedure: 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) [K75.81, K74.60]       Chart review:     Electronic implanted devices? No    Recent diagnosis of diverticulitis within the last 6 weeks? N/A    Diabetic? No    Diabetic medication HOLDING recommendations: (if applicable)  Oral diabetic medications: N/A  Diabetic injectables: N/A  Insulin: N/A      Medication review:    Anticoagulants? No    NSAIDS? No NSAID medications per patient's medication list.  RN will verify with pre-assessment call.    Other medication HOLDING recommendations:  Iron supplements: HOLD 7 days before procedure.  Farxiga- taking for heart failure -talk to prescribing provider about holding      Prep for procedure:     Prep instructions sent via Mippinjong Castro RN  Endoscopy Procedure Pre Assessment RN  [phone] option 4

## 2024-01-30 NOTE — TELEPHONE ENCOUNTER
Second call attempt to complete pre assessment.     No answer.  Left message to return call to [phone] #4 by next business day prior to 4PM or procedure will be sent to cancel.     Additional information needed?  N/A      Ania Ramirez RN  Endoscopy Procedure Pre Assessment RN

## 2024-02-01 ENCOUNTER — TELEPHONE (OUTPATIENT)
Dept: GASTROENTEROLOGY | Facility: CLINIC | Age: 69
End: 2024-02-01
Payer: COMMERCIAL

## 2024-02-01 NOTE — TELEPHONE ENCOUNTER
----- Message from Ania Ramirez RN sent at 2/1/2024  9:26 AM CST -----  Regarding: cancel  Pre assessment was not completed for upcoming Upper endoscopy (EGD) scheduled on 2.8.24.    Please cancel per policy.       Thank you,     Ania Ramirez RN  Endoscopy Procedure Pre Assessment RN  [phone] option 4

## 2024-02-01 NOTE — TELEPHONE ENCOUNTER
No return call received.   Pre assessment was not completed for upcoming scheduled procedure.     Staff message sent to endoscopy scheduling to cancel procedure per policy.       Ania Ramirez RN   Endoscopy Procedure Pre Assessment RN

## 2024-02-01 NOTE — TELEPHONE ENCOUNTER
Caller: No call  Reason for Reschedule/Cancellation (please be detailed, any staff messages or encounters to note?): Cancellation policy - pre-assessment call not completed.      Prior to reschedule please review:  Ordering Provider: Tang Holloway MD   Sedation Determined: Moderate  Does patient have any ASC Exclusions, please identify?: Y - cirrhosis      Notes on Cancelled Procedure:  Procedure: Upper Endoscopy [EGD]   Date: 2/8/2024  Location: [facility]; [address] [place] [address], [address], [place] [id number]  Surgeon: Tan      Rescheduled: No, sent MyChart and CTL.     CASE IN DEPOT

## 2024-02-06 NOTE — TELEPHONE ENCOUNTER
Caller: Geri        Rescheduled: Yes  Procedure: Upper Endoscopy [EGD]   Date: 02/08/2024  Location: [facility]; [address], [address], [place] [address]  Surgeon: Tan  Sedation Level Scheduled  Mod,  Reason for Sedation Level Order  Instructions updated and sent: y  Y     Send In - basket message to Panc - Janes Pool if EUS  procedure is canceled or rescheduled: [ N/A, YES or NO] n/a

## 2024-02-06 NOTE — TELEPHONE ENCOUNTER
Scheduling called Pre Assessment Endoscopy RN stating that patient is rescheduled for an EGD on 2.8.2024 at [facility].    ----------------------------------------------------------------------------------------------------------------------  Pre assessment completed for upcoming procedure.      Procedure details:    Patient scheduled for Upper endoscopy (EGD) on 2.8.2024.     Arrival time: 1245. Procedure time 1345    Pre op exam needed? N/A    Facility location: [facility]; [address], [address], [place] [address]    Sedation type: Conscious sedation     Cathryn Matthews RN

## 2024-02-06 NOTE — TELEPHONE ENCOUNTER
Phone connection was very choppy.  Hard to hear patients at times.      Pre assessment completed for upcoming procedure.   (Please see previous telephone encounter notes for complete details)    Patient  returned call.       Procedure details:    Arrival time and facility location reviewed.    Pre op exam needed? N/A    Designated  policy reviewed. Instructed to have someone stay 6  hours post procedure.     COVID policy reviewed.      Medication review:    Medications reviewed. Please see supporting documentation below. Holding recommendations discussed (if applicable).       Prep for procedure:     Procedure prep instructions reviewed.        Any additional information needed:  N/A      Patient  verbalized understanding and had no questions or concerns at this time.      Cathryn Matthews RN  Endoscopy Procedure Pre Assessment RN  [phone] option 4

## 2024-02-08 ENCOUNTER — HOSPITAL ENCOUNTER (OUTPATIENT)
Facility: CLINIC | Age: 69
Discharge: HOME | End: 2024-02-08
Attending: INTERNAL MEDICINE | Admitting: INTERNAL MEDICINE
Payer: COMMERCIAL

## 2024-02-08 VITALS
SYSTOLIC BLOOD PRESSURE: 119 MMHG | DIASTOLIC BLOOD PRESSURE: 66 MMHG | HEART RATE: 55 BPM | OXYGEN SATURATION: 96 % | RESPIRATION RATE: 16 BRPM

## 2024-02-08 LAB — UPPER GI ENDOSCOPY: NORMAL

## 2024-02-08 PROCEDURE — 250N000009 HC RX 250: Performed by: INTERNAL MEDICINE

## 2024-02-08 PROCEDURE — 999N000099 HC STATISTIC MODERATE SEDATION < 10 MIN: Performed by: INTERNAL MEDICINE

## 2024-02-08 PROCEDURE — 250N000011 HC RX IP 250 OP 636: Performed by: INTERNAL MEDICINE

## 2024-02-08 PROCEDURE — 43235 EGD DIAGNOSTIC BRUSH WASH: CPT | Performed by: INTERNAL MEDICINE

## 2024-02-08 RX ORDER — FLUMAZENIL 0.1 MG/ML
0.2 INJECTION, SOLUTION INTRAVENOUS
Status: CANCELLED | OUTPATIENT
Start: 2024-02-08 | End: 2024-02-09

## 2024-02-08 RX ORDER — NALOXONE HYDROCHLORIDE 0.4 MG/ML
0.2 INJECTION, SOLUTION INTRAMUSCULAR; INTRAVENOUS; SUBCUTANEOUS
Status: CANCELLED | OUTPATIENT
Start: 2024-02-08

## 2024-02-08 RX ORDER — ONDANSETRON 4 MG/1
4 TABLET, ORALLY DISINTEGRATING ORAL EVERY 6 HOURS PRN
Status: CANCELLED | OUTPATIENT
Start: 2024-02-08

## 2024-02-08 RX ORDER — ONDANSETRON 2 MG/ML
4 INJECTION INTRAMUSCULAR; INTRAVENOUS
Status: DISCONTINUED | OUTPATIENT
Start: 2024-02-08 | End: 2024-02-08 | Stop reason: HOSPADM

## 2024-02-08 RX ORDER — LIDOCAINE 40 MG/G
CREAM TOPICAL
Status: DISCONTINUED | OUTPATIENT
Start: 2024-02-08 | End: 2024-02-08 | Stop reason: HOSPADM

## 2024-02-08 RX ORDER — PROCHLORPERAZINE MALEATE 5 MG
5 TABLET ORAL EVERY 6 HOURS PRN
Status: CANCELLED | OUTPATIENT
Start: 2024-02-08

## 2024-02-08 RX ORDER — ONDANSETRON 2 MG/ML
4 INJECTION INTRAMUSCULAR; INTRAVENOUS EVERY 6 HOURS PRN
Status: CANCELLED | OUTPATIENT
Start: 2024-02-08

## 2024-02-08 RX ORDER — FENTANYL CITRATE 50 UG/ML
INJECTION, SOLUTION INTRAMUSCULAR; INTRAVENOUS PRN
Status: DISCONTINUED | OUTPATIENT
Start: 2024-02-08 | End: 2024-02-08 | Stop reason: HOSPADM

## 2024-02-08 RX ORDER — NALOXONE HYDROCHLORIDE 0.4 MG/ML
0.4 INJECTION, SOLUTION INTRAMUSCULAR; INTRAVENOUS; SUBCUTANEOUS
Status: CANCELLED | OUTPATIENT
Start: 2024-02-08

## 2024-02-08 ASSESSMENT — ACTIVITIES OF DAILY LIVING (ADL): ADLS_ACUITY_SCORE: 35

## 2024-02-08 NOTE — H&P
Geri Zamorano  [id number]  female  68 year old      Reason for procedure/surgery: varices    Patient Active Problem List   Diagnosis    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)       Past Surgical History:    Past Surgical History:   Procedure Laterality Date    CHOLECYSTECTOMY      ESOPHAGOSCOPY, GASTROSCOPY, DUODENOSCOPY (EGD), COMBINED N/A 2/26/2020    Procedure: ESOPHAGOGASTRODUODENOSCOPY, WITH BIOPSY;  Surgeon: Leventhal, Thomas Michael, MD;  [facility]    ESOPHAGOSCOPY, GASTROSCOPY, DUODENOSCOPY (EGD), COMBINED N/A 7/14/2022    Procedure: ESOPHAGOGASTRODUODENOSCOPY (EGD);  Surgeon: Tang Holloway MD;  Location:  GI    IR LIVER BIOPSY PERCUTANEOUS  1/21/2020    PERCUTANEOUS BIOPSY LIVER Right 1/21/2020    Procedure: Liver Biopsy @9:00;  Surgeon: Emanuel Archer PA-C;  [facility] OR    SALPINGO-OOPHORECTOMY BILATERAL      SHOULDER SURGERY      THYROIDECTOMY  1996    ZZC TOTAL ABDOM HYSTERECTOMY         Past Medical History:   Past Medical History:   Diagnosis Date    Acute pericarditis     Aortic stenosis 10/2020    Asthma     Depression with anxiety     GERD (gastroesophageal reflux disease)     Heart failure with preserved ejection fraction (H)     Hyperlipidemia     Hypertension     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) 01/2020    Migraine     Obesity     SLE (systemic lupus erythematosus) (H) 02/2020    Thyroid cancer (H) 1996       Social History:   Social History     Tobacco Use    Smoking status: Never    Smokeless tobacco: Never   Substance Use Topics    Alcohol use: Yes     Comment: occasionally       Family History:   Family History   Problem Relation Age of Onset    Breast Cancer Mother     Coronary Artery Disease Father     Colon Cancer Brother 50    Liver Disease No family hx of     Rheumatoid Arthritis No family hx of        Allergies:   Allergies   Allergen Reactions    Dairy Digestive Diarrhea    Dust Mite Extract Other (See Comments) and Unknown     Sneezing, coughing,  runny nose  Sneezing, coughing, runny nose      Gluten Meal      Other reaction(s): *Unknown    Mold Other (See Comments)     Sneezing, coughing, runny nose    Soy Allergy      Other reaction(s): *Unknown       Active Medications:   No current outpatient medications on file.       Systemic Review:   CONSTITUTIONAL: NEGATIVE for fever, chills, change in weight  ENT/MOUTH: NEGATIVE for ear, mouth and throat problems  RESP: NEGATIVE for significant cough or SOB  CV: NEGATIVE for chest pain, palpitations or peripheral edema    Physical Examination:   Vital Signs: /70   Pulse 56   Resp 27   SpO2 100%   GENERAL: healthy, alert and no distress  NECK: no adenopathy, no asymmetry, masses, or scars  RESP: lungs clear to auscultation - no rales, rhonchi or wheezes  CV: regular rate and rhythm, normal S1 S2, no S3 or S4, no murmur, click or rub, no peripheral edema and peripheral pulses strong  ABDOMEN: soft, nontender, no hepatosplenomegaly, no masses and bowel sounds normal  MS: no gross musculoskeletal defects noted, no edema    Plan: Appropriate to proceed as scheduled.      Tang Maddox MD  2/8/2024    PCP:  Jovanni Sneed

## 2024-02-08 NOTE — OR NURSING
Procedure: egd with no interventions  Sedation: conscious sedation   Specimens: none  O2: ra  Tolerated procedure: well  Pt to recovery area in stable condition accompanied by RN, bedside report given to recovery RN  Other:  n/a    All belongings with patient at time of transfer.

## 2024-03-05 DIAGNOSIS — K74.60 LIVER CIRRHOSIS SECONDARY TO NASH (H): Primary | ICD-10-CM

## 2024-03-05 DIAGNOSIS — K75.81 LIVER CIRRHOSIS SECONDARY TO NASH (H): Primary | ICD-10-CM

## 2024-03-11 ENCOUNTER — ANCILLARY PROCEDURE (OUTPATIENT)
Dept: ULTRASOUND IMAGING | Facility: CLINIC | Age: 69
End: 2024-03-11
Attending: INTERNAL MEDICINE
Payer: COMMERCIAL

## 2024-03-11 DIAGNOSIS — K74.60 LIVER CIRRHOSIS SECONDARY TO NASH (H): ICD-10-CM

## 2024-03-11 DIAGNOSIS — K75.81 LIVER CIRRHOSIS SECONDARY TO NASH (H): ICD-10-CM

## 2024-03-11 PROCEDURE — 76700 US EXAM ABDOM COMPLETE: CPT | Performed by: STUDENT IN AN ORGANIZED HEALTH CARE EDUCATION/TRAINING PROGRAM

## 2024-03-13 ENCOUNTER — LAB (OUTPATIENT)
Dept: LAB | Facility: CLINIC | Age: 69
End: 2024-03-13
Attending: INTERNAL MEDICINE
Payer: COMMERCIAL

## 2024-03-13 ENCOUNTER — OFFICE VISIT (OUTPATIENT)
Dept: GASTROENTEROLOGY | Facility: CLINIC | Age: 69
End: 2024-03-13
Attending: INTERNAL MEDICINE
Payer: COMMERCIAL

## 2024-03-13 VITALS
BODY MASS INDEX: 31.41 KG/M2 | DIASTOLIC BLOOD PRESSURE: 78 MMHG | HEART RATE: 58 BPM | HEIGHT: 64 IN | WEIGHT: 184 LBS | SYSTOLIC BLOOD PRESSURE: 148 MMHG

## 2024-03-13 DIAGNOSIS — K74.60 LIVER CIRRHOSIS SECONDARY TO NASH (H): ICD-10-CM

## 2024-03-13 DIAGNOSIS — K75.81 LIVER CIRRHOSIS SECONDARY TO NASH (H): ICD-10-CM

## 2024-03-13 DIAGNOSIS — K74.60 LIVER CIRRHOSIS SECONDARY TO NASH (H): Primary | ICD-10-CM

## 2024-03-13 DIAGNOSIS — K75.81 LIVER CIRRHOSIS SECONDARY TO NASH (H): Primary | ICD-10-CM

## 2024-03-13 LAB
ALBUMIN SERPL BCG-MCNC: 4.2 G/DL (ref 3.5–5.2)
ALP SERPL-CCNC: 92 U/L (ref 40–150)
ALT SERPL W P-5'-P-CCNC: <5 U/L (ref 0–50)
ANION GAP SERPL CALCULATED.3IONS-SCNC: 12 MMOL/L (ref 7–15)
AST SERPL W P-5'-P-CCNC: 20 U/L (ref 0–45)
BILIRUB DIRECT SERPL-MCNC: <0.2 MG/DL (ref 0–0.3)
BILIRUB SERPL-MCNC: 0.4 MG/DL
BUN SERPL-MCNC: 18.4 MG/DL (ref 8–23)
CALCIUM SERPL-MCNC: 9.7 MG/DL (ref 8.8–10.2)
CHLORIDE SERPL-SCNC: 102 MMOL/L (ref 98–107)
CREAT SERPL-MCNC: 1.41 MG/DL (ref 0.51–0.95)
DEPRECATED HCO3 PLAS-SCNC: 26 MMOL/L (ref 22–29)
EGFRCR SERPLBLD CKD-EPI 2021: 40 ML/MIN/1.73M2
ERYTHROCYTE [DISTWIDTH] IN BLOOD BY AUTOMATED COUNT: 16 % (ref 10–15)
GLUCOSE SERPL-MCNC: 109 MG/DL (ref 70–99)
HCT VFR BLD AUTO: 41.7 % (ref 35–47)
HGB BLD-MCNC: 13.3 G/DL (ref 11.7–15.7)
INR PPP: 1.11 (ref 0.85–1.15)
MCH RBC QN AUTO: 27.4 PG (ref 26.5–33)
MCHC RBC AUTO-ENTMCNC: 31.9 G/DL (ref 31.5–36.5)
MCV RBC AUTO: 86 FL (ref 78–100)
PLATELET # BLD AUTO: 152 10E3/UL (ref 150–450)
POTASSIUM SERPL-SCNC: 4.1 MMOL/L (ref 3.4–5.3)
PROT SERPL-MCNC: 7.6 G/DL (ref 6.4–8.3)
RBC # BLD AUTO: 4.85 10E6/UL (ref 3.8–5.2)
SODIUM SERPL-SCNC: 140 MMOL/L (ref 135–145)
WBC # BLD AUTO: 4.8 10E3/UL (ref 4–11)

## 2024-03-13 PROCEDURE — 82248 BILIRUBIN DIRECT: CPT | Performed by: PATHOLOGY

## 2024-03-13 PROCEDURE — 85610 PROTHROMBIN TIME: CPT | Performed by: PATHOLOGY

## 2024-03-13 PROCEDURE — 36415 COLL VENOUS BLD VENIPUNCTURE: CPT | Performed by: PATHOLOGY

## 2024-03-13 PROCEDURE — 99214 OFFICE O/P EST MOD 30 MIN: CPT | Performed by: INTERNAL MEDICINE

## 2024-03-13 PROCEDURE — G0463 HOSPITAL OUTPT CLINIC VISIT: HCPCS | Performed by: INTERNAL MEDICINE

## 2024-03-13 PROCEDURE — 85027 COMPLETE CBC AUTOMATED: CPT | Performed by: PATHOLOGY

## 2024-03-13 PROCEDURE — G2211 COMPLEX E/M VISIT ADD ON: HCPCS | Performed by: INTERNAL MEDICINE

## 2024-03-13 PROCEDURE — 80053 COMPREHEN METABOLIC PANEL: CPT | Performed by: PATHOLOGY

## 2024-03-13 RX ORDER — BUPROPION HYDROCHLORIDE 150 MG/1
150 TABLET ORAL DAILY
COMMUNITY

## 2024-03-13 ASSESSMENT — PAIN SCALES - GENERAL: PAINLEVEL: NO PAIN (0)

## 2024-03-13 NOTE — LETTER
3/13/2024         RE: Geri Zamorano  [address]  [place] [address]        Dear Colleague,    Thank you for referring your patient, Geri Zmaorano, to the [facility] [place]. Please see a copy of my visit note below.    [facility] Hepatology    Follow-up Visit    Follow-up visit for cirrhosis    Subjective:  68 year old female    Cirrhosis  - dx Jan 2020  - cryptogenic/?AIH/MASLD  - liver bx 1/21/2020  - no hx ascites, HE or variceal bleed  - last EGD Feb 2024- diminutive EV  - HCC screening- abd U/S Mar 2024     Last visit November 2023.  Patient underwent EGD in February 2024 which showed stable diminutive esophageal varices.  Patient was switched from escitalopram to bupropion.  No ER visits or hospital admissions since last visit.    Patient is well today.  She has no symptoms related to liver disease.  She also denies any chest pain, dyspnea or cough.    Patient denies jaundice, lower extremity edema, abdominal distension, lethargy or confusion.    Patient denies melena, hematemesis or hematochezia.    Patient denies fevers, sweats or chills.  She has not yet had her COVID-19 or RSV vaccinations this winter.    Weight stable.  Appetite is good.    Patient rarely drinks alcohol.  She is going on a cruise to [place] in June.  She is planning to get right shoulder surgery and August/September 2024-she had been hoping to get this done before her vacation but the timeline was not going to fit.      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     Migraine      Obesity      SLE (systemic lupus erythematosus) (H) 02/2020     Thyroid cancer (H) 1996      Past Surgical History:    Procedure Laterality Date     CHOLECYSTECTOMY       ESOPHAGOSCOPY, GASTROSCOPY, DUODENOSCOPY (EGD), COMBINED N/A 2/26/2020    Procedure: ESOPHAGOGASTRODUODENOSCOPY, WITH BIOPSY;  Surgeon: Leventhal, Thomas Michael, MD;  [facility]: [facility]     ESOPHAGOSCOPY, GASTROSCOPY, DUODENOSCOPY (EGD), COMBINED N/A 7/14/2022    Procedure: ESOPHAGOGASTRODUODENOSCOPY (EGD);  Surgeon: Tang Holloway MD;  Location:  GI     ESOPHAGOSCOPY, GASTROSCOPY, DUODENOSCOPY (EGD), COMBINED N/A 2/8/2024    Procedure: Esophagoscopy, gastroscopy, duodenoscopy (EGD), combined;  Surgeon: Tang Holloway MD;  Location:  GI     IR LIVER BIOPSY PERCUTANEOUS  1/21/2020     PERCUTANEOUS BIOPSY LIVER Right 1/21/2020    Procedure: Liver Biopsy @9:00;  Surgeon: Emanuel Archer PA-C;  Location: [facility] OR     SALPINGO-OOPHORECTOMY BILATERAL       SHOULDER SURGERY       THYROIDECTOMY  1996     [facility] TOTAL ABDOM HYSTERECTOMY            Medications  Prior to Admission medications    Medication Sig Start Date End Date Taking? Authorizing Provider   albuterol (PROAIR HFA/PROVENTIL HFA/VENTOLIN HFA) 108 (90 Base) MCG/ACT Inhaler Inhale 2 puffs into the lungs daily   Yes Reported, Patient   amLODIPine (NORVASC) 5 MG tablet Take 5 mg by mouth daily   Yes Reported, Patient   atorvastatin (LIPITOR) 20 MG tablet Take 20 mg by mouth daily   Yes Reported, Patient   azaTHIOprine (IMURAN) 50 MG tablet Take 1 tablet by mouth daily 2/10/20  Yes Reported, Patient   budesonide-formoterol (SYMBICORT) 160-4.5 MCG/ACT Inhaler Inhale 2 puffs into the lungs 2 times daily   Yes Reported, Patient   buPROPion (WELLBUTRIN XL) 150 MG 24 hr tablet Take 150 mg by mouth daily   Yes Reported, Patient   calcitRIOL (ROCALTROL) 0.25 MCG capsule Take 0.5 mcg by mouth 2 times daily  8/15/16  Yes Reported, Patient   calcium carbonate-vitamin D (OS-SHAYAN) 600-400 MG-UNIT chewable tablet Take 4 chew tab by mouth 3 times daily  1/13/1996  Yes Reported, Patient   cholecalciferol  (VITAMIN D3) 25 mcg (1000 units) capsule Take 1 capsule by mouth daily   Yes Reported, Patient   colchicine (MITIGARE) 0.6 MG capsule Take 0.6 mg by mouth 2 times daily 1/17/20  Yes Reported, Patient   FARXIGA 10 MG TABS tablet Take 10 mg by mouth daily 10/25/23  Yes Reported, Patient   Ferrous Sulfate (IRON) 325 (65 Fe) MG tablet Take 1 tablet by mouth daily  2/10/18  Yes Reported, Patient   hydroxychloroquine (PLAQUENIL) 200 MG tablet Take 200 mg by mouth 2 times daily 1/31/20  Yes Reported, Patient   levothyroxine (SYNTHROID/LEVOTHROID) 200 MCG tablet Take 250 mcg by mouth daily  1/13/1996  Yes Reported, Patient   metoprolol succinate ER (TOPROL-XL) 100 MG 24 hr tablet Take 100 mg by mouth 2 times daily 5/10/21  Yes Reported, Patient   montelukast (SINGULAIR) 10 MG tablet Take 10 mg by mouth daily 5/1/1995  Yes Reported, Patient   omeprazole (PRILOSEC) 40 MG DR capsule Take 40 mg by mouth daily 3/26/21  Yes Reported, Patient   rizatriptan (MAXALT-MLT) 10 MG ODT Take 10 mg by mouth daily as needed 9/13/23  Yes Reported, Patient   spironolactone (ALDACTONE) 25 MG tablet Take 25 mg by mouth daily 2/23/23  Yes Reported, Patient   tiZANidine (ZANAFLEX) 4 MG tablet Take 1 tablet by mouth every 8 hours as needed 6/12/23  Yes Reported, Patient   topiramate (TOPAMAX) 25 MG tablet Take 1 tablet by mouth daily   Yes Reported, Patient   torsemide (DEMADEX) 20 MG tablet Take 20 mg by mouth daily as needed (edema)  10/4/20  Yes Reported, Patient   vitamin B complex with vitamin C (STRESS TAB) tablet Take 1 tablet by mouth daily   Yes Reported, Patient   vitamin C (ASCORBIC ACID) 500 MG tablet Take 500 mg by mouth daily 10/22/20  Yes Reported, Patient   BREO ELLIPTA 200-25 MCG/INH Inhaler INL 1 PUFF PO DAILY. RM AFTER U  Patient not taking: Reported on 3/13/2024 6/24/19   Reported, Patient   lisinopril (PRINIVIL/ZESTRIL) 20 MG tablet Take 20 mg by mouth daily  Patient not taking: Reported on 3/13/2024 5/1/01   Reported, Patient  "  potassium chloride ER (KLOR-CON M) 20 MEQ CR tablet Take 2 tablets by mouth 2 times daily as needed Take only when taking Torsemide  Patient not taking: Reported on 3/13/2024 4/28/21   Reported, Patient   SPIRIVA RESPIMAT 2.5 MCG/ACT inhaler INL 2 PFS PO QD  Patient not taking: Reported on 3/13/2024 12/20/19   Reported, Patient       Allergies  Allergies   Allergen Reactions     Dairy Digestive Diarrhea     Dust Mite Extract Other (See Comments) and Unknown     Sneezing, coughing, runny nose  Sneezing, coughing, runny nose       Gluten Meal      Other reaction(s): *Unknown     Mold Other (See Comments)     Sneezing, coughing, runny nose     Soy Allergy      Other reaction(s): *Unknown       Review of systems  A 10-point review of systems was negative    Examination  BP (!) 148/78   Pulse 58   Ht 1.626 m (5' 4\")   Wt 83.5 kg (184 lb)   BMI 31.58 kg/m    Body mass index is 31.58 kg/m .    Gen- well, NAD, A+Ox3, normal color  CVS- RRR  RS- CTA  Abd-  overweight, soft, non-tender, no ascites or organomegaly on palpation or percussion, BS+  Extr- hands normal, no ALEX  Skin- no rash or jaundice  Neuro- no asterixis  Psych- normal mood    Laboratory  Lab Results   Component Value Date     03/13/2024     06/16/2021    POTASSIUM 4.1 03/13/2024    POTASSIUM 4.0 04/13/2022    POTASSIUM 4.2 06/16/2021    CHLORIDE 102 03/13/2024    CHLORIDE 103 04/13/2022    CHLORIDE 109 06/16/2021    CO2 26 03/13/2024    CO2 28 04/13/2022    CO2 30 06/16/2021    BUN 18.4 03/13/2024    BUN 10 04/13/2022    BUN 18 06/16/2021    CR 1.41 03/13/2024    CR 1.10 06/16/2021       Lab Results   Component Value Date    BILITOTAL 0.4 03/13/2024    BILITOTAL 0.6 06/16/2021    ALT <5 03/13/2024    ALT 14 06/16/2021    AST 20 03/13/2024    AST 21 06/16/2021    ALKPHOS 92 03/13/2024    ALKPHOS 65 06/16/2021       Lab Results   Component Value Date    ALBUMIN 4.2 03/13/2024    ALBUMIN 3.7 04/13/2022    ALBUMIN 3.1 06/16/2021    PROTTOTAL 7.6 " 03/13/2024    PROTTOTAL 6.5 06/16/2021        Lab Results   Component Value Date    WBC 4.8 03/13/2024    WBC 3.8 06/16/2021    HGB 13.3 03/13/2024    HGB 11.1 06/16/2021    MCV 86 03/13/2024    MCV 90 06/16/2021     03/13/2024     06/16/2021       Lab Results   Component Value Date    INR 1.11 03/13/2024    INR 1.03 06/16/2021       Radiology  Abd U/S March 2024 reviewed- no mass, no ascites    Assessment  68 year old female who presents for follow-up of compensated cryptogenic/MASLD cirrhosis.  MELD 3.0 = 12 (stable).  No evidence of ascites or hepatic encephalopathy.  Up-to-date with HCC screening.  Up to date with surveillance of esophageal varices-stable diminutive varices since 2020.  No contraindication to proceeding with orthopedic surgery as planned later this year.    Plan  Low Na diet  Continue diuretics per cardiology  Continue atorvastatin  Ok to proceed with shoulder surgery  Follow-up in 6 months    Tang Maddox MD  Hepatology  [facility]    I spent 30 minutes on the date of the encounter doing chart review, history and exam, documentation and further activities as noted above.      Again, thank you for allowing me to participate in the care of your patient.        Sincerely,        Tang Maddox MD

## 2024-03-13 NOTE — PROGRESS NOTES
[facility] Hepatology    Follow-up Visit    Follow-up visit for cirrhosis    Subjective:  68 year old female    Cirrhosis  - dx Jan 2020  - cryptogenic/?AIH/MASLD  - liver bx 1/21/2020  - no hx ascites, HE or variceal bleed  - last EGD Feb 2024- diminutive EV  - HCC screening- abd U/S Mar 2024     Last visit November 2023.  Patient underwent EGD in February 2024 which showed stable diminutive esophageal varices.  Patient was switched from escitalopram to bupropion.  No ER visits or hospital admissions since last visit.    Patient is well today.  She has no symptoms related to liver disease.  She also denies any chest pain, dyspnea or cough.    Patient denies jaundice, lower extremity edema, abdominal distension, lethargy or confusion.    Patient denies melena, hematemesis or hematochezia.    Patient denies fevers, sweats or chills.  She has not yet had her COVID-19 or RSV vaccinations this winter.    Weight stable.  Appetite is good.    Patient rarely drinks alcohol.  She is going on a cruise to [place] in June.  She is planning to get right shoulder surgery and August/September 2024-she had been hoping to get this done before her vacation but the timeline was not going to fit.      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     Migraine      Obesity      SLE (systemic lupus erythematosus) (H) 02/2020     Thyroid cancer (H) 1996      Past Surgical History:   Procedure Laterality Date     CHOLECYSTECTOMY       ESOPHAGOSCOPY, GASTROSCOPY, DUODENOSCOPY (EGD), COMBINED N/A 2/26/2020    Procedure: ESOPHAGOGASTRODUODENOSCOPY, WITH BIOPSY;  Surgeon: Leventhal, Thomas Michael, MD;  [facility]: [facility] OR     ESOPHAGOSCOPY,  GASTROSCOPY, DUODENOSCOPY (EGD), COMBINED N/A 7/14/2022    Procedure: ESOPHAGOGASTRODUODENOSCOPY (EGD);  Surgeon: Tang Holloway MD;  Location:  GI     ESOPHAGOSCOPY, GASTROSCOPY, DUODENOSCOPY (EGD), COMBINED N/A 2/8/2024    Procedure: Esophagoscopy, gastroscopy, duodenoscopy (EGD), combined;  Surgeon: Tang Holloway MD;  Location:  GI     IR LIVER BIOPSY PERCUTANEOUS  1/21/2020     PERCUTANEOUS BIOPSY LIVER Right 1/21/2020    Procedure: Liver Biopsy @9:00;  Surgeon: Emanuel Archer PA-C;  Location: [facility] OR     SALPINGO-OOPHORECTOMY BILATERAL       SHOULDER SURGERY       THYROIDECTOMY  1996     [facility] TOTAL ABDOM HYSTERECTOMY            Medications  Prior to Admission medications    Medication Sig Start Date End Date Taking? Authorizing Provider   albuterol (PROAIR HFA/PROVENTIL HFA/VENTOLIN HFA) 108 (90 Base) MCG/ACT Inhaler Inhale 2 puffs into the lungs daily   Yes Reported, Patient   amLODIPine (NORVASC) 5 MG tablet Take 5 mg by mouth daily   Yes Reported, Patient   atorvastatin (LIPITOR) 20 MG tablet Take 20 mg by mouth daily   Yes Reported, Patient   azaTHIOprine (IMURAN) 50 MG tablet Take 1 tablet by mouth daily 2/10/20  Yes Reported, Patient   budesonide-formoterol (SYMBICORT) 160-4.5 MCG/ACT Inhaler Inhale 2 puffs into the lungs 2 times daily   Yes Reported, Patient   buPROPion (WELLBUTRIN XL) 150 MG 24 hr tablet Take 150 mg by mouth daily   Yes Reported, Patient   calcitRIOL (ROCALTROL) 0.25 MCG capsule Take 0.5 mcg by mouth 2 times daily  8/15/16  Yes Reported, Patient   calcium carbonate-vitamin D (OS-SHAYAN) 600-400 MG-UNIT chewable tablet Take 4 chew tab by mouth 3 times daily  1/13/1996  Yes Reported, Patient   cholecalciferol (VITAMIN D3) 25 mcg (1000 units) capsule Take 1 capsule by mouth daily   Yes Reported, Patient   colchicine (MITIGARE) 0.6 MG capsule Take 0.6 mg by mouth 2 times daily 1/17/20  Yes Reported, Patient   FARXIGA 10 MG TABS tablet Take 10 mg by mouth daily  10/25/23  Yes Reported, Patient   Ferrous Sulfate (IRON) 325 (65 Fe) MG tablet Take 1 tablet by mouth daily  2/10/18  Yes Reported, Patient   hydroxychloroquine (PLAQUENIL) 200 MG tablet Take 200 mg by mouth 2 times daily 1/31/20  Yes Reported, Patient   levothyroxine (SYNTHROID/LEVOTHROID) 200 MCG tablet Take 250 mcg by mouth daily  1/13/1996  Yes Reported, Patient   metoprolol succinate ER (TOPROL-XL) 100 MG 24 hr tablet Take 100 mg by mouth 2 times daily 5/10/21  Yes Reported, Patient   montelukast (SINGULAIR) 10 MG tablet Take 10 mg by mouth daily 5/1/1995  Yes Reported, Patient   omeprazole (PRILOSEC) 40 MG DR capsule Take 40 mg by mouth daily 3/26/21  Yes Reported, Patient   rizatriptan (MAXALT-MLT) 10 MG ODT Take 10 mg by mouth daily as needed 9/13/23  Yes Reported, Patient   spironolactone (ALDACTONE) 25 MG tablet Take 25 mg by mouth daily 2/23/23  Yes Reported, Patient   tiZANidine (ZANAFLEX) 4 MG tablet Take 1 tablet by mouth every 8 hours as needed 6/12/23  Yes Reported, Patient   topiramate (TOPAMAX) 25 MG tablet Take 1 tablet by mouth daily   Yes Reported, Patient   torsemide (DEMADEX) 20 MG tablet Take 20 mg by mouth daily as needed (edema)  10/4/20  Yes Reported, Patient   vitamin B complex with vitamin C (STRESS TAB) tablet Take 1 tablet by mouth daily   Yes Reported, Patient   vitamin C (ASCORBIC ACID) 500 MG tablet Take 500 mg by mouth daily 10/22/20  Yes Reported, Patient   BREO ELLIPTA 200-25 MCG/INH Inhaler INL 1 PUFF PO DAILY. RM AFTER U  Patient not taking: Reported on 3/13/2024 6/24/19   Reported, Patient   lisinopril (PRINIVIL/ZESTRIL) 20 MG tablet Take 20 mg by mouth daily  Patient not taking: Reported on 3/13/2024 5/1/01   Reported, Patient   potassium chloride ER (KLOR-CON M) 20 MEQ CR tablet Take 2 tablets by mouth 2 times daily as needed Take only when taking Torsemide  Patient not taking: Reported on 3/13/2024 4/28/21   Reported, Patient   SPIRIVA RESPIMAT 2.5 MCG/ACT inhaler INL 2 PFS  "PO QD  Patient not taking: Reported on 3/13/2024 12/20/19   Reported, Patient       Allergies  Allergies   Allergen Reactions     Dairy Digestive Diarrhea     Dust Mite Extract Other (See Comments) and Unknown     Sneezing, coughing, runny nose  Sneezing, coughing, runny nose       Gluten Meal      Other reaction(s): *Unknown     Mold Other (See Comments)     Sneezing, coughing, runny nose     Soy Allergy      Other reaction(s): *Unknown       Review of systems  A 10-point review of systems was negative    Examination  BP (!) 148/78   Pulse 58   Ht 1.626 m (5' 4\")   Wt 83.5 kg (184 lb)   BMI 31.58 kg/m    Body mass index is 31.58 kg/m .    Gen- well, NAD, A+Ox3, normal color  CVS- RRR  RS- CTA  Abd-  overweight, soft, non-tender, no ascites or organomegaly on palpation or percussion, BS+  Extr- hands normal, no ALEX  Skin- no rash or jaundice  Neuro- no asterixis  Psych- normal mood    Laboratory  Lab Results   Component Value Date     03/13/2024     06/16/2021    POTASSIUM 4.1 03/13/2024    POTASSIUM 4.0 04/13/2022    POTASSIUM 4.2 06/16/2021    CHLORIDE 102 03/13/2024    CHLORIDE 103 04/13/2022    CHLORIDE 109 06/16/2021    CO2 26 03/13/2024    CO2 28 04/13/2022    CO2 30 06/16/2021    BUN 18.4 03/13/2024    BUN 10 04/13/2022    BUN 18 06/16/2021    CR 1.41 03/13/2024    CR 1.10 06/16/2021       Lab Results   Component Value Date    BILITOTAL 0.4 03/13/2024    BILITOTAL 0.6 06/16/2021    ALT <5 03/13/2024    ALT 14 06/16/2021    AST 20 03/13/2024    AST 21 06/16/2021    ALKPHOS 92 03/13/2024    ALKPHOS 65 06/16/2021       Lab Results   Component Value Date    ALBUMIN 4.2 03/13/2024    ALBUMIN 3.7 04/13/2022    ALBUMIN 3.1 06/16/2021    PROTTOTAL 7.6 03/13/2024    PROTTOTAL 6.5 06/16/2021        Lab Results   Component Value Date    WBC 4.8 03/13/2024    WBC 3.8 06/16/2021    HGB 13.3 03/13/2024    HGB 11.1 06/16/2021    MCV 86 03/13/2024    MCV 90 06/16/2021     03/13/2024     " 06/16/2021       Lab Results   Component Value Date    INR 1.11 03/13/2024    INR 1.03 06/16/2021       Radiology  Abd U/S March 2024 reviewed- no mass, no ascites    Assessment  68 year old female who presents for follow-up of compensated cryptogenic/MASLD cirrhosis.  MELD 3.0 = 12 (stable).  No evidence of ascites or hepatic encephalopathy.  Up-to-date with HCC screening.  Up to date with surveillance of esophageal varices-stable diminutive varices since 2020.  No contraindication to proceeding with orthopedic surgery as planned later this year.    Plan  1. Low Na diet  2. Continue diuretics per cardiology  3. Continue atorvastatin  4. Ok to proceed with shoulder surgery  5. Follow-up in 6 months    Tang Maddox MD  Hepatology  [facility]    I spent 30 minutes on the date of the encounter doing chart review, history and exam, documentation and further activities as noted above.

## 2024-03-13 NOTE — NURSING NOTE
"Chief Complaint   Patient presents with    Follow Up     cryptogenic cirrhosis     BP (!) 148/78   Pulse 58   Ht 1.626 m (5' 4\")   Wt 83.5 kg (184 lb)   BMI 31.58 kg/m    Dana Pagan MA on 3/13/2024 at 7:56 AM    "

## 2024-05-05 ENCOUNTER — HEALTH MAINTENANCE LETTER (OUTPATIENT)
Age: 69
End: 2024-05-05

## 2024-08-29 DIAGNOSIS — K75.81 LIVER CIRRHOSIS SECONDARY TO NASH (H): Primary | ICD-10-CM

## 2024-08-29 DIAGNOSIS — K74.60 LIVER CIRRHOSIS SECONDARY TO NASH (H): Primary | ICD-10-CM

## 2024-09-11 ENCOUNTER — ANCILLARY PROCEDURE (OUTPATIENT)
Dept: ULTRASOUND IMAGING | Facility: CLINIC | Age: 69
End: 2024-09-11
Attending: INTERNAL MEDICINE
Payer: COMMERCIAL

## 2024-09-11 ENCOUNTER — LAB (OUTPATIENT)
Dept: LAB | Facility: CLINIC | Age: 69
End: 2024-09-11
Attending: INTERNAL MEDICINE
Payer: COMMERCIAL

## 2024-09-11 ENCOUNTER — OFFICE VISIT (OUTPATIENT)
Dept: GASTROENTEROLOGY | Facility: CLINIC | Age: 69
End: 2024-09-11
Attending: INTERNAL MEDICINE
Payer: COMMERCIAL

## 2024-09-11 VITALS
OXYGEN SATURATION: 98 % | HEART RATE: 57 BPM | DIASTOLIC BLOOD PRESSURE: 72 MMHG | SYSTOLIC BLOOD PRESSURE: 122 MMHG | RESPIRATION RATE: 18 BRPM | TEMPERATURE: 97.6 F | BODY MASS INDEX: 31.38 KG/M2 | HEIGHT: 64 IN | WEIGHT: 183.8 LBS

## 2024-09-11 DIAGNOSIS — K74.60 LIVER CIRRHOSIS SECONDARY TO NASH (H): ICD-10-CM

## 2024-09-11 DIAGNOSIS — K75.81 LIVER CIRRHOSIS SECONDARY TO NASH (H): Primary | ICD-10-CM

## 2024-09-11 DIAGNOSIS — K75.81 LIVER CIRRHOSIS SECONDARY TO NASH (H): ICD-10-CM

## 2024-09-11 DIAGNOSIS — K74.60 LIVER CIRRHOSIS SECONDARY TO NASH (H): Primary | ICD-10-CM

## 2024-09-11 LAB
ALBUMIN SERPL BCG-MCNC: 4.3 G/DL (ref 3.5–5.2)
ALP SERPL-CCNC: 88 U/L (ref 40–150)
ALT SERPL W P-5'-P-CCNC: 12 U/L (ref 0–50)
ANION GAP SERPL CALCULATED.3IONS-SCNC: 10 MMOL/L (ref 7–15)
AST SERPL W P-5'-P-CCNC: 25 U/L (ref 0–45)
BILIRUB DIRECT SERPL-MCNC: <0.2 MG/DL (ref 0–0.3)
BILIRUB SERPL-MCNC: 0.4 MG/DL
BUN SERPL-MCNC: 25.9 MG/DL (ref 8–23)
CALCIUM SERPL-MCNC: 9.3 MG/DL (ref 8.8–10.4)
CHLORIDE SERPL-SCNC: 102 MMOL/L (ref 98–107)
CREAT SERPL-MCNC: 1.14 MG/DL (ref 0.51–0.95)
EGFRCR SERPLBLD CKD-EPI 2021: 51 ML/MIN/1.73M2
ERYTHROCYTE [DISTWIDTH] IN BLOOD BY AUTOMATED COUNT: 14.9 % (ref 10–15)
GLUCOSE SERPL-MCNC: 126 MG/DL (ref 70–99)
HCO3 SERPL-SCNC: 28 MMOL/L (ref 22–29)
HCT VFR BLD AUTO: 44.9 % (ref 35–47)
HGB BLD-MCNC: 14.5 G/DL (ref 11.7–15.7)
INR PPP: 1.01 (ref 0.85–1.15)
MCH RBC QN AUTO: 27.6 PG (ref 26.5–33)
MCHC RBC AUTO-ENTMCNC: 32.3 G/DL (ref 31.5–36.5)
MCV RBC AUTO: 86 FL (ref 78–100)
PLATELET # BLD AUTO: 142 10E3/UL (ref 150–450)
POTASSIUM SERPL-SCNC: 4 MMOL/L (ref 3.4–5.3)
PROT SERPL-MCNC: 7.7 G/DL (ref 6.4–8.3)
RBC # BLD AUTO: 5.25 10E6/UL (ref 3.8–5.2)
SODIUM SERPL-SCNC: 140 MMOL/L (ref 135–145)
WBC # BLD AUTO: 5.6 10E3/UL (ref 4–11)

## 2024-09-11 PROCEDURE — 82248 BILIRUBIN DIRECT: CPT | Performed by: PATHOLOGY

## 2024-09-11 PROCEDURE — 99215 OFFICE O/P EST HI 40 MIN: CPT | Performed by: INTERNAL MEDICINE

## 2024-09-11 PROCEDURE — 80053 COMPREHEN METABOLIC PANEL: CPT | Performed by: PATHOLOGY

## 2024-09-11 PROCEDURE — 85027 COMPLETE CBC AUTOMATED: CPT | Performed by: PATHOLOGY

## 2024-09-11 PROCEDURE — 36415 COLL VENOUS BLD VENIPUNCTURE: CPT | Performed by: PATHOLOGY

## 2024-09-11 PROCEDURE — 85610 PROTHROMBIN TIME: CPT | Performed by: PATHOLOGY

## 2024-09-11 PROCEDURE — 76700 US EXAM ABDOM COMPLETE: CPT | Performed by: RADIOLOGY

## 2024-09-11 PROCEDURE — G0463 HOSPITAL OUTPT CLINIC VISIT: HCPCS | Performed by: INTERNAL MEDICINE

## 2024-09-11 RX ORDER — TORSEMIDE 20 MG/1
20 TABLET ORAL DAILY PRN
COMMUNITY

## 2024-09-11 ASSESSMENT — PAIN SCALES - GENERAL: PAINLEVEL: NO PAIN (0)

## 2024-09-11 NOTE — PROGRESS NOTES
[facility]atology    Follow-up Visit    Follow-up visit for cirrhosis    Subjective:  69 year old female    Cirrhosis  - dx Jan 2020  - cryptogenic/?AIH/MASLD  - liver bx 1/21/2020  - no hx ascites, HE or variceal bleed  - last EGD Feb 2024- diminutive EV  - HCC screening- abd U/S 9/11/2024     Last visit March 2024.  Lisinopril was discontinued 3 months ago.  Patient discontinued Breo Ellipta and Symbicort inhalers.  No new medications, ER visits or hospitalizations since last visit.  Patient is scheduled for a right reverse complex shoulder replacement tomorrow at [facility].    Patient is well today.  She has no symptoms related to liver disease.    Patient denies jaundice, lower extremity edema, abdominal distension, lethargy or confusion.    Patient denies chest pain, dyspnea, palpitations or lightheadedness.  Her exercise tolerance is good at the moment: patient was able to climb 3 flights of stairs without difficulty at a wedding earlier this summer.    Patient denies melena, hematemesis or hematochezia.    Patient denies fevers, sweats or chills.      Weight stable.  Appetite is good.    Patient does not drink alcohol.  Patient was in [place] on vacation this summer on a cruise.      Medical hx Surgical hx   Past Medical History:   Diagnosis Date    Acute pericarditis     Aortic stenosis 10/2020    Asthma     Depression with anxiety     GERD (gastroesophageal reflux disease)     Heart failure with preserved ejection fraction (H)     Hyperlipidemia     Hypertension     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) 01/2020    Migraine     Obesity     SLE (systemic lupus erythematosus) (H) 02/2020    Thyroid cancer (H) 1996      Past Surgical History:   Procedure Laterality Date    CHOLECYSTECTOMY      ESOPHAGOSCOPY, GASTROSCOPY, DUODENOSCOPY (EGD), COMBINED N/A 02/26/2020    Procedure: ESOPHAGOGASTRODUODENOSCOPY, WITH BIOPSY;  Surgeon: Leventhal, Thomas Michael, MD;  Location:  OR     ESOPHAGOSCOPY, GASTROSCOPY, DUODENOSCOPY (EGD), COMBINED N/A 07/14/2022    Procedure: ESOPHAGOGASTRODUODENOSCOPY (EGD);  Surgeon: Tang Holloway MD;  Location:  GI    ESOPHAGOSCOPY, GASTROSCOPY, DUODENOSCOPY (EGD), COMBINED N/A 02/08/2024    Procedure: Esophagoscopy, gastroscopy, duodenoscopy (EGD), combined;  Surgeon: Tang Holloway MD;  Location:  GI    IR LIVER BIOPSY PERCUTANEOUS  01/21/2020    PERCUTANEOUS BIOPSY LIVER Right 01/21/2020    Procedure: Liver Biopsy @9:00;  Surgeon: Emanuel Archer PA-C;  Location: [facility] OR    SALPINGO-OOPHORECTOMY BILATERAL      SHOULDER SURGERY  2013    THYROIDECTOMY  1996    ZZC TOTAL ABDOM HYSTERECTOMY            Medications  Prior to Admission medications    Medication Sig Start Date End Date Taking? Authorizing Provider   albuterol (PROAIR HFA/PROVENTIL HFA/VENTOLIN HFA) 108 (90 Base) MCG/ACT Inhaler Inhale 2 puffs into the lungs every 4 hours as needed.   Yes Reported, Patient   amLODIPine (NORVASC) 5 MG tablet Take 5 mg by mouth daily   Yes Reported, Patient   atorvastatin (LIPITOR) 20 MG tablet Take 20 mg by mouth daily   Yes Reported, Patient   azaTHIOprine (IMURAN) 50 MG tablet Take 1 tablet by mouth daily 2/10/20  Yes Reported, Patient   buPROPion (WELLBUTRIN XL) 150 MG 24 hr tablet Take 150 mg by mouth daily   Yes Reported, Patient   calcitRIOL (ROCALTROL) 0.25 MCG capsule Take 0.5 mcg by mouth 2 times daily  8/15/16  Yes Reported, Patient   calcium carbonate-vitamin D (OS-SHAYAN) 600-400 MG-UNIT chewable tablet Take 4 chew tab by mouth 2 times daily. 1/13/1996  Yes Reported, Patient   cholecalciferol (VITAMIN D3) 25 mcg (1000 units) capsule Take 1 capsule by mouth daily   Yes Reported, Patient   colchicine (MITIGARE) 0.6 MG capsule Take 0.6 mg by mouth 2 times daily 1/17/20  Yes Reported, Patient   FARXIGA 10 MG TABS tablet Take 10 mg by mouth daily 10/25/23  Yes Reported, Patient   Ferrous Sulfate (IRON) 325 (65 Fe) MG tablet Take 1 tablet by  "mouth daily  2/10/18  Yes Reported, Patient   hydroxychloroquine (PLAQUENIL) 200 MG tablet Take 200 mg by mouth 2 times daily 1/31/20  Yes Reported, Patient   levothyroxine (SYNTHROID/LEVOTHROID) 200 MCG tablet Take 250 mcg by mouth daily  1/13/1996  Yes Reported, Patient   metoprolol succinate ER (TOPROL-XL) 100 MG 24 hr tablet Take 100 mg by mouth 2 times daily 5/10/21  Yes Reported, Patient   montelukast (SINGULAIR) 10 MG tablet Take 10 mg by mouth daily 5/1/1995  Yes Reported, Patient   omeprazole (PRILOSEC) 40 MG DR capsule Take 40 mg by mouth daily 3/26/21  Yes Reported, Patient   rizatriptan (MAXALT-MLT) 10 MG ODT Take 10 mg by mouth daily as needed 9/13/23  Yes Reported, Patient   torsemide (DEMADEX) 20 MG tablet Take 20 mg by mouth daily as needed.   Yes Reported, Patient   vitamin B complex with vitamin C (STRESS TAB) tablet Take 1 tablet by mouth daily   Yes Reported, Patient   vitamin C (ASCORBIC ACID) 500 MG tablet Take 500 mg by mouth daily 10/22/20  Yes Reported, Patient   potassium chloride ER (KLOR-CON M) 20 MEQ CR tablet Take 2 tablets by mouth 2 times daily as needed Take only when taking Torsemide  Patient not taking: Reported on 3/13/2024 4/28/21   Reported, Patient       Allergies  Allergies   Allergen Reactions    Dairy Digestive Diarrhea    Dust Mite Extract Other (See Comments) and Unknown     Sneezing, coughing, runny nose  Sneezing, coughing, runny nose      Gluten Meal      Other reaction(s): *Unknown    Mold Other (See Comments)     Sneezing, coughing, runny nose    Soy Allergy      Other reaction(s): *Unknown       Review of systems  A 10-point review of systems was negative    Examination  /72 (BP Location: Left arm, Patient Position: Sitting, Cuff Size: Adult Large)   Pulse 57   Temp 97.6  F (36.4  C) (Oral)   Resp 18   Ht 1.626 m (5' 4.02\")   Wt 83.4 kg (183 lb 12.8 oz)   SpO2 98%   BMI 31.53 kg/m    Body mass index is 31.53 kg/m .    Gen- well, NAD, A+Ox3, normal " color  CVS- RRR, systolic click  RS- CTA  Abd- soft, non-tender, no ascites or organomegaly on palpation or percussion, BS+  Extr- hands normal, no ALEX  Skin- no rash or jaundice  Neuro- no asterixis  Psych- normal mood    Laboratory  Lab Results   Component Value Date     09/11/2024     06/16/2021    POTASSIUM 4.0 09/11/2024    POTASSIUM 4.0 04/13/2022    POTASSIUM 4.2 06/16/2021    CHLORIDE 102 09/11/2024    CHLORIDE 103 04/13/2022    CHLORIDE 109 06/16/2021    CO2 28 09/11/2024    CO2 28 04/13/2022    CO2 30 06/16/2021    BUN 25.9 09/11/2024    BUN 10 04/13/2022    BUN 18 06/16/2021    CR 1.14 09/11/2024    CR 1.10 06/16/2021       Lab Results   Component Value Date    BILITOTAL 0.4 09/11/2024    BILITOTAL 0.6 06/16/2021    ALT 12 09/11/2024    ALT 14 06/16/2021    AST 25 09/11/2024    AST 21 06/16/2021    ALKPHOS 88 09/11/2024    ALKPHOS 65 06/16/2021       Lab Results   Component Value Date    ALBUMIN 4.3 09/11/2024    ALBUMIN 3.7 04/13/2022    ALBUMIN 3.1 06/16/2021    PROTTOTAL 7.7 09/11/2024    PROTTOTAL 6.5 06/16/2021        Lab Results   Component Value Date    WBC 5.6 09/11/2024    WBC 3.8 06/16/2021    HGB 14.5 09/11/2024    HGB 11.1 06/16/2021    MCV 86 09/11/2024    MCV 90 06/16/2021     09/11/2024     06/16/2021       Lab Results   Component Value Date    INR 1.01 09/11/2024    INR 1.03 06/16/2021       Radiology  Abd U/S 9/11/2024 (P)    Assessment  69 year old female who presents for follow-up of compensated cryptogenic cirrhosis.  MELD 3.0 = 9 (stable).  No evidence of ascites or by encephalopathy.  Patient's liver function is adequate to undergo surgery as planned tomorrow.  Would avoid NSAID medications as these can cause fluid retention and kidney injury in patients with cirrhosis.  Up-to-date with surveillance of esophageal varices.  Up-to-date with HCC screening.    Predicted Postoperative Outcomes by the VOCAL-Av Score for major orthopedic surgery:        30-day  mortality: 0.4%      90-day mortality: 1.2%      180-day mortality: 1.7%      90-day decompensation: 1.8%    Plan  Follow-up pending abd U/S  Low Na diet  OK to proceed with surgery  Continue atorvastatin as prescribed  Follow-up in 6 months    Tang Maddox MD  Hepatology  [facility]    I spent 40 minutes on the date of the encounter doing chart review, history and exam, documentation and further activities as noted above.

## 2024-09-11 NOTE — NURSING NOTE
"Chief Complaint   Patient presents with    RECHECK     Cirrhosis      Vital signs:  Temp: 97.6  F (36.4  C) Temp src: Oral BP: 122/72 Pulse: 57   Resp: 18 SpO2: 98 %     Height: 162.6 cm (5' 4.02\") Weight: 83.4 kg (183 lb 12.8 oz)  Estimated body mass index is 31.53 kg/m  as calculated from the following:    Height as of this encounter: 1.626 m (5' 4.02\").    Weight as of this encounter: 83.4 kg (183 lb 12.8 oz).      Fadumo Lechuga, [facility]  9/11/2024 10:36 AM    "

## 2024-09-11 NOTE — LETTER
9/11/2024      Geri Zamorano  [address]  [place] [address]      Dear Colleague,    Thank you for referring your patient, Geri Zamorano, to the [facility] [place]. Please see a copy of my visit note below.    [facility]    Follow-up Visit    Follow-up visit for cirrhosis    Subjective:  69 year old female    Cirrhosis  - dx Jan 2020  - cryptogenic/?AIH/MASLD  - liver bx 1/21/2020  - no hx ascites, HE or variceal bleed  - last EGD Feb 2024- diminutive EV  - HCC screening- abd U/S 9/11/2024     Last visit March 2024.  Lisinopril was discontinued 3 months ago.  Patient discontinued Breo Ellipta and Symbicort inhalers.  No new medications, ER visits or hospitalizations since last visit.  Patient is scheduled for a right reverse complex shoulder replacement tomorrow at [facility].    Patient is well today.  She has no symptoms related to liver disease.    Patient denies jaundice, lower extremity edema, abdominal distension, lethargy or confusion.    Patient denies chest pain, dyspnea, palpitations or lightheadedness.  Her exercise tolerance is good at the moment: patient was able to climb 3 flights of stairs without difficulty at a wedding earlier this summer.    Patient denies melena, hematemesis or hematochezia.    Patient denies fevers, sweats or chills.      Weight stable.  Appetite is good.    Patient does not drink alcohol.  Patient was in [place] on vacation this summer on a cruise.      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     Migraine      Obesity      SLE (systemic lupus erythematosus) (H) 02/2020     Thyroid cancer (H) 1996      Past  Surgical History:   Procedure Laterality Date     CHOLECYSTECTOMY       ESOPHAGOSCOPY, GASTROSCOPY, DUODENOSCOPY (EGD), COMBINED N/A 02/26/2020    Procedure: ESOPHAGOGASTRODUODENOSCOPY, WITH BIOPSY;  Surgeon: Leventhal, Thomas Michael, MD;  Location: [facility] OR     ESOPHAGOSCOPY, GASTROSCOPY, DUODENOSCOPY (EGD), COMBINED N/A 07/14/2022    Procedure: ESOPHAGOGASTRODUODENOSCOPY (EGD);  Surgeon: Tang Holloway MD;  Location:  GI     ESOPHAGOSCOPY, GASTROSCOPY, DUODENOSCOPY (EGD), COMBINED N/A 02/08/2024    Procedure: Esophagoscopy, gastroscopy, duodenoscopy (EGD), combined;  Surgeon: Tang Holloway MD;  Location:  GI     IR LIVER BIOPSY PERCUTANEOUS  01/21/2020     PERCUTANEOUS BIOPSY LIVER Right 01/21/2020    Procedure: Liver Biopsy @9:00;  Surgeon: Emanuel Archer PA-C;  Location: [facility] OR     SALPINGO-OOPHORECTOMY BILATERAL       SHOULDER SURGERY  2013     THYROIDECTOMY  1996     [facility] TOTAL ABDOM HYSTERECTOMY            Medications  Prior to Admission medications    Medication Sig Start Date End Date Taking? Authorizing Provider   albuterol (PROAIR HFA/PROVENTIL HFA/VENTOLIN HFA) 108 (90 Base) MCG/ACT Inhaler Inhale 2 puffs into the lungs every 4 hours as needed.   Yes Reported, Patient   amLODIPine (NORVASC) 5 MG tablet Take 5 mg by mouth daily   Yes Reported, Patient   atorvastatin (LIPITOR) 20 MG tablet Take 20 mg by mouth daily   Yes Reported, Patient   azaTHIOprine (IMURAN) 50 MG tablet Take 1 tablet by mouth daily 2/10/20  Yes Reported, Patient   buPROPion (WELLBUTRIN XL) 150 MG 24 hr tablet Take 150 mg by mouth daily   Yes Reported, Patient   calcitRIOL (ROCALTROL) 0.25 MCG capsule Take 0.5 mcg by mouth 2 times daily  8/15/16  Yes Reported, Patient   calcium carbonate-vitamin D (OS-SHAYAN) 600-400 MG-UNIT chewable tablet Take 4 chew tab by mouth 2 times daily. 1/13/1996  Yes Reported, Patient   cholecalciferol (VITAMIN D3) 25 mcg (1000 units) capsule Take 1 capsule by mouth daily   Yes  Reported, Patient   colchicine (MITIGARE) 0.6 MG capsule Take 0.6 mg by mouth 2 times daily 1/17/20  Yes Reported, Patient   FARXIGA 10 MG TABS tablet Take 10 mg by mouth daily 10/25/23  Yes Reported, Patient   Ferrous Sulfate (IRON) 325 (65 Fe) MG tablet Take 1 tablet by mouth daily  2/10/18  Yes Reported, Patient   hydroxychloroquine (PLAQUENIL) 200 MG tablet Take 200 mg by mouth 2 times daily 1/31/20  Yes Reported, Patient   levothyroxine (SYNTHROID/LEVOTHROID) 200 MCG tablet Take 250 mcg by mouth daily  1/13/1996  Yes Reported, Patient   metoprolol succinate ER (TOPROL-XL) 100 MG 24 hr tablet Take 100 mg by mouth 2 times daily 5/10/21  Yes Reported, Patient   montelukast (SINGULAIR) 10 MG tablet Take 10 mg by mouth daily 5/1/1995  Yes Reported, Patient   omeprazole (PRILOSEC) 40 MG DR capsule Take 40 mg by mouth daily 3/26/21  Yes Reported, Patient   rizatriptan (MAXALT-MLT) 10 MG ODT Take 10 mg by mouth daily as needed 9/13/23  Yes Reported, Patient   torsemide (DEMADEX) 20 MG tablet Take 20 mg by mouth daily as needed.   Yes Reported, Patient   vitamin B complex with vitamin C (STRESS TAB) tablet Take 1 tablet by mouth daily   Yes Reported, Patient   vitamin C (ASCORBIC ACID) 500 MG tablet Take 500 mg by mouth daily 10/22/20  Yes Reported, Patient   potassium chloride ER (KLOR-CON M) 20 MEQ CR tablet Take 2 tablets by mouth 2 times daily as needed Take only when taking Torsemide  Patient not taking: Reported on 3/13/2024 4/28/21   Reported, Patient       Allergies  Allergies   Allergen Reactions     Dairy Digestive Diarrhea     Dust Mite Extract Other (See Comments) and Unknown     Sneezing, coughing, runny nose  Sneezing, coughing, runny nose       Gluten Meal      Other reaction(s): *Unknown     Mold Other (See Comments)     Sneezing, coughing, runny nose     Soy Allergy      Other reaction(s): *Unknown       Review of systems  A 10-point review of systems was negative    Examination  /72 (BP Location:  "Left arm, Patient Position: Sitting, Cuff Size: Adult Large)   Pulse 57   Temp 97.6  F (36.4  C) (Oral)   Resp 18   Ht 1.626 m (5' 4.02\")   Wt 83.4 kg (183 lb 12.8 oz)   SpO2 98%   BMI 31.53 kg/m    Body mass index is 31.53 kg/m .    Gen- well, NAD, A+Ox3, normal color  CVS- RRR, systolic click  RS- CTA  Abd- soft, non-tender, no ascites or organomegaly on palpation or percussion, BS+  Extr- hands normal, no ALEX  Skin- no rash or jaundice  Neuro- no asterixis  Psych- normal mood    Laboratory  Lab Results   Component Value Date     09/11/2024     06/16/2021    POTASSIUM 4.0 09/11/2024    POTASSIUM 4.0 04/13/2022    POTASSIUM 4.2 06/16/2021    CHLORIDE 102 09/11/2024    CHLORIDE 103 04/13/2022    CHLORIDE 109 06/16/2021    CO2 28 09/11/2024    CO2 28 04/13/2022    CO2 30 06/16/2021    BUN 25.9 09/11/2024    BUN 10 04/13/2022    BUN 18 06/16/2021    CR 1.14 09/11/2024    CR 1.10 06/16/2021       Lab Results   Component Value Date    BILITOTAL 0.4 09/11/2024    BILITOTAL 0.6 06/16/2021    ALT 12 09/11/2024    ALT 14 06/16/2021    AST 25 09/11/2024    AST 21 06/16/2021    ALKPHOS 88 09/11/2024    ALKPHOS 65 06/16/2021       Lab Results   Component Value Date    ALBUMIN 4.3 09/11/2024    ALBUMIN 3.7 04/13/2022    ALBUMIN 3.1 06/16/2021    PROTTOTAL 7.7 09/11/2024    PROTTOTAL 6.5 06/16/2021        Lab Results   Component Value Date    WBC 5.6 09/11/2024    WBC 3.8 06/16/2021    HGB 14.5 09/11/2024    HGB 11.1 06/16/2021    MCV 86 09/11/2024    MCV 90 06/16/2021     09/11/2024     06/16/2021       Lab Results   Component Value Date    INR 1.01 09/11/2024    INR 1.03 06/16/2021       Radiology  Abd U/S 9/11/2024 (P)    Assessment  69 year old female who presents for follow-up of compensated cryptogenic cirrhosis.  MELD 3.0 = 9 (stable).  No evidence of ascites or by encephalopathy.  Patient's liver function is adequate to undergo surgery as planned tomorrow.  Would avoid NSAID medications " as these can cause fluid retention and kidney injury in patients with cirrhosis.  Up-to-date with surveillance of esophageal varices.  Up-to-date with HCC screening.    Predicted Postoperative Outcomes by the VOCAL-Av Score for major orthopedic surgery:        30-day mortality: 0.4%      90-day mortality: 1.2%      180-day mortality: 1.7%      90-day decompensation: 1.8%    Plan  Follow-up pending abd U/S  Low Na diet  OK to proceed with surgery  Continue atorvastatin as prescribed  Follow-up in 6 months    Tang Maddox MD  Hepatology  [facility]    I spent 40 minutes on the date of the encounter doing chart review, history and exam, documentation and further activities as noted above.      Again, thank you for allowing me to participate in the care of your patient.        Sincerely,        Tang Maddox MD

## 2024-09-22 ENCOUNTER — HEALTH MAINTENANCE LETTER (OUTPATIENT)
Age: 69
End: 2024-09-22

## 2025-01-12 ENCOUNTER — HEALTH MAINTENANCE LETTER (OUTPATIENT)
Age: 70
End: 2025-01-12

## (undated) DEVICE — RAD KNIFE HANDLE W/11 BLADE DISPOSABLE 371611

## (undated) DEVICE — SUCTION CATH AIRLIFE TRI-FLO W/CONTROL PORT 14FR  T60C

## (undated) DEVICE — GELFOAM 7X12MM

## (undated) DEVICE — SOL NACL 0.9% INJ 250ML BAG 2B1322Q

## (undated) DEVICE — SUCTION MANIFOLD NEPTUNE 2 SYS 1 PORT 702-025-000

## (undated) DEVICE — GLOVE PROTEXIS POWDER FREE SMT 8.0  2D72PT80X

## (undated) DEVICE — SPECIMEN CONTAINER 60MLW/10% FORMALIN 59601

## (undated) DEVICE — DECANTER BAG 2002S

## (undated) DEVICE — KIT ENDO FIRST STEP DISINFECTANT 200ML W/POUCH EP-4

## (undated) DEVICE — SYR 30ML SLIP TIP W/O NDL 302833

## (undated) DEVICE — COVER ULTRASOUND PROBE W/GEL FLEXI-FEEL 6"X58" LF  25-FF658

## (undated) DEVICE — Device

## (undated) DEVICE — SOL WATER IRRIG 1000ML BOTTLE 2F7114

## (undated) DEVICE — KIT ENDO TURNOVER/PROCEDURE CARRY-ON 101822

## (undated) DEVICE — ENDO BITE BLOCK ADULT OMNI-BLOC

## (undated) DEVICE — NDL BIOPSY TEMNO 18GAX15CM ACT1815

## (undated) DEVICE — CATH IV 16X1-1/4 PROTECTIV 326210

## (undated) DEVICE — LINEN GOWN XLG 5407

## (undated) DEVICE — WIRE GUIDE BENSON STR .035X50CM G00661

## (undated) DEVICE — LINEN TOWEL PACK X5 5464

## (undated) DEVICE — NDL 18GAX1.5" 305185

## (undated) DEVICE — DRSG PRIMAPORE 02X3" 7133

## (undated) DEVICE — TUBING SUCTION 12"X1/4" N612

## (undated) RX ORDER — HEPARIN SODIUM (PORCINE) LOCK FLUSH IV SOLN 100 UNIT/ML 100 UNIT/ML
SOLUTION INTRAVENOUS
Status: DISPENSED
Start: 2020-01-21

## (undated) RX ORDER — FENTANYL CITRATE 50 UG/ML
INJECTION, SOLUTION INTRAMUSCULAR; INTRAVENOUS
Status: DISPENSED
Start: 2024-02-08

## (undated) RX ORDER — DIPHENHYDRAMINE HYDROCHLORIDE 50 MG/ML
INJECTION INTRAMUSCULAR; INTRAVENOUS
Status: DISPENSED
Start: 2022-07-14

## (undated) RX ORDER — FENTANYL CITRATE 50 UG/ML
INJECTION, SOLUTION INTRAMUSCULAR; INTRAVENOUS
Status: DISPENSED
Start: 2022-07-14

## (undated) RX ORDER — FENTANYL CITRATE 50 UG/ML
INJECTION, SOLUTION INTRAMUSCULAR; INTRAVENOUS
Status: DISPENSED
Start: 2020-02-26

## (undated) RX ORDER — DIPHENHYDRAMINE HYDROCHLORIDE 50 MG/ML
INJECTION INTRAMUSCULAR; INTRAVENOUS
Status: DISPENSED
Start: 2020-02-26

## (undated) RX ORDER — HEPARIN SODIUM,PORCINE 10 UNIT/ML
VIAL (ML) INTRAVENOUS
Status: DISPENSED
Start: 2020-01-21

## (undated) RX ORDER — OXYCODONE HYDROCHLORIDE 5 MG/1
TABLET ORAL
Status: DISPENSED
Start: 2020-01-21

## (undated) RX ORDER — ONDANSETRON 2 MG/ML
INJECTION INTRAMUSCULAR; INTRAVENOUS
Status: DISPENSED
Start: 2020-02-26